# Patient Record
Sex: MALE | Race: BLACK OR AFRICAN AMERICAN | NOT HISPANIC OR LATINO | Employment: OTHER | URBAN - METROPOLITAN AREA
[De-identification: names, ages, dates, MRNs, and addresses within clinical notes are randomized per-mention and may not be internally consistent; named-entity substitution may affect disease eponyms.]

---

## 2018-01-17 ENCOUNTER — HOSPITAL ENCOUNTER (INPATIENT)
Facility: HOSPITAL | Age: 53
LOS: 2 days | Discharge: HOME | DRG: 280 | End: 2018-01-19
Attending: EMERGENCY MEDICINE | Admitting: HOSPITALIST
Payer: MEDICAID

## 2018-01-17 DIAGNOSIS — R07.9 CHEST PAIN: ICD-10-CM

## 2018-01-17 DIAGNOSIS — I25.10 CORONARY ARTERY DISEASE, ANGINA PRESENCE UNSPECIFIED, UNSPECIFIED VESSEL OR LESION TYPE, UNSPECIFIED WHETHER NATIVE OR TRANSPLANTED HEART: Chronic | ICD-10-CM

## 2018-01-17 DIAGNOSIS — D63.8 ANEMIA OF CHRONIC DISEASE: Chronic | ICD-10-CM

## 2018-01-17 DIAGNOSIS — Z72.0 TOBACCO ABUSE: Chronic | ICD-10-CM

## 2018-01-17 DIAGNOSIS — E78.2 MIXED HYPERLIPIDEMIA: Chronic | ICD-10-CM

## 2018-01-17 DIAGNOSIS — Z86.73 HISTORY OF CVA (CEREBROVASCULAR ACCIDENT): Chronic | ICD-10-CM

## 2018-01-17 DIAGNOSIS — I21.4 NSTEMI (NON-ST ELEVATED MYOCARDIAL INFARCTION): Primary | ICD-10-CM

## 2018-01-17 DIAGNOSIS — F14.10 COCAINE ABUSE: Chronic | ICD-10-CM

## 2018-01-17 DIAGNOSIS — I10 HYPERTENSION, UNSPECIFIED TYPE: Chronic | ICD-10-CM

## 2018-01-17 DIAGNOSIS — Z95.1 HX OF CABG: ICD-10-CM

## 2018-01-17 DIAGNOSIS — I50.43 ACUTE ON CHRONIC COMBINED SYSTOLIC (CONGESTIVE) AND DIASTOLIC (CONGESTIVE) HEART FAILURE: ICD-10-CM

## 2018-01-17 LAB
ALBUMIN SERPL BCP-MCNC: 3.5 G/DL
ALP SERPL-CCNC: 68 U/L
ALT SERPL W/O P-5'-P-CCNC: 37 U/L
AMPHET+METHAMPHET UR QL: NEGATIVE
ANION GAP SERPL CALC-SCNC: 9 MMOL/L
AST SERPL-CCNC: 43 U/L
BARBITURATES UR QL SCN>200 NG/ML: NEGATIVE
BASOPHILS # BLD AUTO: 0.04 K/UL
BASOPHILS NFR BLD: 0.7 %
BENZODIAZ UR QL SCN>200 NG/ML: NEGATIVE
BILIRUB SERPL-MCNC: 0.7 MG/DL
BNP SERPL-MCNC: 1849 PG/ML
BUN SERPL-MCNC: 23 MG/DL
BZE UR QL SCN: NEGATIVE
CALCIUM SERPL-MCNC: 9.2 MG/DL
CANNABINOIDS UR QL SCN: NEGATIVE
CHLORIDE SERPL-SCNC: 110 MMOL/L
CO2 SERPL-SCNC: 20 MMOL/L
CREAT SERPL-MCNC: 1.4 MG/DL
CREAT UR-MCNC: <5 MG/DL
DIFFERENTIAL METHOD: ABNORMAL
EOSINOPHIL # BLD AUTO: 0.1 K/UL
EOSINOPHIL NFR BLD: 1.3 %
ERYTHROCYTE [DISTWIDTH] IN BLOOD BY AUTOMATED COUNT: 17.8 %
EST. GFR  (AFRICAN AMERICAN): >60 ML/MIN/1.73 M^2
EST. GFR  (NON AFRICAN AMERICAN): 57 ML/MIN/1.73 M^2
ESTIMATED PA SYSTOLIC PRESSURE: 45.45
GLOBAL PERICARDIAL EFFUSION: ABNORMAL
GLUCOSE SERPL-MCNC: 95 MG/DL
HCT VFR BLD AUTO: 32.4 %
HGB BLD-MCNC: 10.3 G/DL
INR PPP: 1.1
LYMPHOCYTES # BLD AUTO: 2.2 K/UL
LYMPHOCYTES NFR BLD: 36.1 %
MCH RBC QN AUTO: 25.6 PG
MCHC RBC AUTO-ENTMCNC: 31.8 G/DL
MCV RBC AUTO: 81 FL
METHADONE UR QL SCN>300 NG/ML: NEGATIVE
MITRAL VALVE REGURGITATION: ABNORMAL
MONOCYTES # BLD AUTO: 0.9 K/UL
MONOCYTES NFR BLD: 15.3 %
NEUTROPHILS # BLD AUTO: 2.8 K/UL
NEUTROPHILS NFR BLD: 46.4 %
OPIATES UR QL SCN: ABNORMAL
PCP UR QL SCN>25 NG/ML: NEGATIVE
PLATELET # BLD AUTO: 231 K/UL
PMV BLD AUTO: 13.8 FL
POTASSIUM SERPL-SCNC: 4.1 MMOL/L
PROT SERPL-MCNC: 6.3 G/DL
PROTHROMBIN TIME: 11 SEC
RBC # BLD AUTO: 4.02 M/UL
RETIRED EF AND QEF - SEE NOTES: 20 (ref 55–65)
SODIUM SERPL-SCNC: 139 MMOL/L
TOXICOLOGY INFORMATION: ABNORMAL
TRICUSPID VALVE REGURGITATION: ABNORMAL
TROPONIN I SERPL DL<=0.01 NG/ML-MCNC: 1.24 NG/ML
TROPONIN I SERPL DL<=0.01 NG/ML-MCNC: 1.3 NG/ML
TROPONIN I SERPL DL<=0.01 NG/ML-MCNC: 1.43 NG/ML
WBC # BLD AUTO: 5.95 K/UL

## 2018-01-17 PROCEDURE — 99291 CRITICAL CARE FIRST HOUR: CPT | Mod: 25

## 2018-01-17 PROCEDURE — 25000003 PHARM REV CODE 250: Performed by: HOSPITALIST

## 2018-01-17 PROCEDURE — 25000003 PHARM REV CODE 250: Performed by: INTERNAL MEDICINE

## 2018-01-17 PROCEDURE — 63600175 PHARM REV CODE 636 W HCPCS: Performed by: EMERGENCY MEDICINE

## 2018-01-17 PROCEDURE — 93306 TTE W/DOPPLER COMPLETE: CPT

## 2018-01-17 PROCEDURE — 85025 COMPLETE CBC W/AUTO DIFF WBC: CPT

## 2018-01-17 PROCEDURE — 63600175 PHARM REV CODE 636 W HCPCS: Performed by: HOSPITALIST

## 2018-01-17 PROCEDURE — 20000000 HC ICU ROOM

## 2018-01-17 PROCEDURE — 25000003 PHARM REV CODE 250: Performed by: EMERGENCY MEDICINE

## 2018-01-17 PROCEDURE — 96374 THER/PROPH/DIAG INJ IV PUSH: CPT

## 2018-01-17 PROCEDURE — 83880 ASSAY OF NATRIURETIC PEPTIDE: CPT

## 2018-01-17 PROCEDURE — 36415 COLL VENOUS BLD VENIPUNCTURE: CPT

## 2018-01-17 PROCEDURE — 93010 ELECTROCARDIOGRAM REPORT: CPT | Mod: 76,,, | Performed by: INTERNAL MEDICINE

## 2018-01-17 PROCEDURE — 93010 ELECTROCARDIOGRAM REPORT: CPT | Mod: ,,, | Performed by: INTERNAL MEDICINE

## 2018-01-17 PROCEDURE — 84484 ASSAY OF TROPONIN QUANT: CPT | Mod: 91

## 2018-01-17 PROCEDURE — 80307 DRUG TEST PRSMV CHEM ANLYZR: CPT

## 2018-01-17 PROCEDURE — 80053 COMPREHEN METABOLIC PANEL: CPT

## 2018-01-17 PROCEDURE — 85610 PROTHROMBIN TIME: CPT

## 2018-01-17 PROCEDURE — 93005 ELECTROCARDIOGRAM TRACING: CPT

## 2018-01-17 PROCEDURE — 96372 THER/PROPH/DIAG INJ SC/IM: CPT

## 2018-01-17 PROCEDURE — 99233 SBSQ HOSP IP/OBS HIGH 50: CPT | Mod: ,,, | Performed by: INTERNAL MEDICINE

## 2018-01-17 PROCEDURE — 93306 TTE W/DOPPLER COMPLETE: CPT | Mod: 26,,, | Performed by: INTERNAL MEDICINE

## 2018-01-17 RX ORDER — HYDRALAZINE HYDROCHLORIDE 20 MG/ML
10 INJECTION INTRAMUSCULAR; INTRAVENOUS ONCE
Status: COMPLETED | OUTPATIENT
Start: 2018-01-17 | End: 2018-01-17

## 2018-01-17 RX ORDER — NITROGLYCERIN 0.4 MG/1
0.4 TABLET SUBLINGUAL EVERY 5 MIN PRN
Status: ON HOLD | COMMUNITY
End: 2019-01-01 | Stop reason: SDUPTHER

## 2018-01-17 RX ORDER — ONDANSETRON 2 MG/ML
4 INJECTION INTRAMUSCULAR; INTRAVENOUS EVERY 6 HOURS PRN
Status: DISCONTINUED | OUTPATIENT
Start: 2018-01-17 | End: 2018-01-19 | Stop reason: HOSPADM

## 2018-01-17 RX ORDER — MORPHINE SULFATE 10 MG/ML
4 INJECTION INTRAMUSCULAR; INTRAVENOUS; SUBCUTANEOUS
Status: COMPLETED | OUTPATIENT
Start: 2018-01-17 | End: 2018-01-17

## 2018-01-17 RX ORDER — METOPROLOL TARTRATE 25 MG/1
25 TABLET, FILM COATED ORAL
Status: COMPLETED | OUTPATIENT
Start: 2018-01-17 | End: 2018-01-17

## 2018-01-17 RX ORDER — MIRTAZAPINE 7.5 MG/1
7.5 TABLET, FILM COATED ORAL NIGHTLY PRN
Status: DISCONTINUED | OUTPATIENT
Start: 2018-01-17 | End: 2018-01-19 | Stop reason: HOSPADM

## 2018-01-17 RX ORDER — ATORVASTATIN CALCIUM 40 MG/1
80 TABLET, FILM COATED ORAL DAILY
Status: DISCONTINUED | OUTPATIENT
Start: 2018-01-17 | End: 2018-01-19 | Stop reason: HOSPADM

## 2018-01-17 RX ORDER — FUROSEMIDE 10 MG/ML
40 INJECTION INTRAMUSCULAR; INTRAVENOUS 2 TIMES DAILY
Status: DISCONTINUED | OUTPATIENT
Start: 2018-01-17 | End: 2018-01-18

## 2018-01-17 RX ORDER — ASPIRIN 81 MG/1
81 TABLET ORAL DAILY
Status: DISCONTINUED | OUTPATIENT
Start: 2018-01-17 | End: 2018-01-19 | Stop reason: HOSPADM

## 2018-01-17 RX ORDER — NAPROXEN SODIUM 220 MG/1
81 TABLET, FILM COATED ORAL DAILY
COMMUNITY

## 2018-01-17 RX ORDER — MORPHINE SULFATE 10 MG/ML
4 INJECTION INTRAMUSCULAR; INTRAVENOUS; SUBCUTANEOUS EVERY 4 HOURS PRN
Status: DISCONTINUED | OUTPATIENT
Start: 2018-01-17 | End: 2018-01-19 | Stop reason: HOSPADM

## 2018-01-17 RX ORDER — ATORVASTATIN CALCIUM 10 MG/1
TABLET, FILM COATED ORAL DAILY
Status: ON HOLD | COMMUNITY
End: 2019-01-01 | Stop reason: SDUPTHER

## 2018-01-17 RX ORDER — METOPROLOL TARTRATE 25 MG/1
25 TABLET, FILM COATED ORAL 2 TIMES DAILY
Status: DISCONTINUED | OUTPATIENT
Start: 2018-01-17 | End: 2018-01-17

## 2018-01-17 RX ORDER — NITROGLYCERIN 20 MG/100ML
5 INJECTION INTRAVENOUS CONTINUOUS
Status: DISCONTINUED | OUTPATIENT
Start: 2018-01-17 | End: 2018-01-18

## 2018-01-17 RX ORDER — ASPIRIN 325 MG
325 TABLET ORAL
Status: COMPLETED | OUTPATIENT
Start: 2018-01-17 | End: 2018-01-17

## 2018-01-17 RX ORDER — NITROGLYCERIN 0.4 MG/1
0.4 TABLET SUBLINGUAL EVERY 5 MIN PRN
Status: DISCONTINUED | OUTPATIENT
Start: 2018-01-17 | End: 2018-01-19 | Stop reason: HOSPADM

## 2018-01-17 RX ORDER — AMLODIPINE BESYLATE 5 MG/1
10 TABLET ORAL ONCE
Status: COMPLETED | OUTPATIENT
Start: 2018-01-17 | End: 2018-01-17

## 2018-01-17 RX ORDER — POLYETHYLENE GLYCOL 3350 17 G/17G
17 POWDER, FOR SOLUTION ORAL 2 TIMES DAILY PRN
Status: DISCONTINUED | OUTPATIENT
Start: 2018-01-17 | End: 2018-01-19 | Stop reason: HOSPADM

## 2018-01-17 RX ORDER — LISINOPRIL 10 MG/1
10 TABLET ORAL DAILY
Status: ON HOLD | COMMUNITY
End: 2019-01-01 | Stop reason: SDUPTHER

## 2018-01-17 RX ORDER — LISINOPRIL 5 MG/1
10 TABLET ORAL DAILY
Status: DISCONTINUED | OUTPATIENT
Start: 2018-01-17 | End: 2018-01-19 | Stop reason: HOSPADM

## 2018-01-17 RX ORDER — CLOPIDOGREL 300 MG/1
600 TABLET, FILM COATED ORAL ONCE
Status: COMPLETED | OUTPATIENT
Start: 2018-01-17 | End: 2018-01-17

## 2018-01-17 RX ORDER — ACETAMINOPHEN 325 MG/1
650 TABLET ORAL EVERY 6 HOURS PRN
Status: DISCONTINUED | OUTPATIENT
Start: 2018-01-17 | End: 2018-01-19 | Stop reason: HOSPADM

## 2018-01-17 RX ORDER — ENOXAPARIN SODIUM 100 MG/ML
1 INJECTION SUBCUTANEOUS
Status: COMPLETED | OUTPATIENT
Start: 2018-01-17 | End: 2018-01-17

## 2018-01-17 RX ORDER — DOCUSATE SODIUM 100 MG/1
100 CAPSULE, LIQUID FILLED ORAL DAILY
Status: DISCONTINUED | OUTPATIENT
Start: 2018-01-18 | End: 2018-01-19 | Stop reason: HOSPADM

## 2018-01-17 RX ADMIN — FUROSEMIDE 40 MG: 10 INJECTION, SOLUTION INTRAMUSCULAR; INTRAVENOUS at 02:01

## 2018-01-17 RX ADMIN — ASPIRIN 81 MG: 81 TABLET, COATED ORAL at 12:01

## 2018-01-17 RX ADMIN — ATORVASTATIN CALCIUM 80 MG: 40 TABLET, FILM COATED ORAL at 12:01

## 2018-01-17 RX ADMIN — ENOXAPARIN SODIUM 80 MG: 100 INJECTION SUBCUTANEOUS at 08:01

## 2018-01-17 RX ADMIN — NITROGLYCERIN 5 MCG/MIN: 20 INJECTION INTRAVENOUS at 03:01

## 2018-01-17 RX ADMIN — ONDANSETRON 4 MG: 2 INJECTION INTRAMUSCULAR; INTRAVENOUS at 06:01

## 2018-01-17 RX ADMIN — METOPROLOL TARTRATE 25 MG: 25 TABLET ORAL at 08:01

## 2018-01-17 RX ADMIN — LISINOPRIL 10 MG: 5 TABLET ORAL at 12:01

## 2018-01-17 RX ADMIN — HYDRALAZINE HYDROCHLORIDE 10 MG: 20 INJECTION INTRAMUSCULAR; INTRAVENOUS at 02:01

## 2018-01-17 RX ADMIN — MORPHINE SULFATE 4 MG: 10 INJECTION INTRAVENOUS at 08:01

## 2018-01-17 RX ADMIN — MORPHINE SULFATE 4 MG: 10 INJECTION INTRAVENOUS at 01:01

## 2018-01-17 RX ADMIN — NITROGLYCERIN 1 INCH: 20 OINTMENT TOPICAL at 08:01

## 2018-01-17 RX ADMIN — ASPIRIN 325 MG ORAL TABLET 325 MG: 325 PILL ORAL at 08:01

## 2018-01-17 RX ADMIN — METOPROLOL TARTRATE 25 MG: 25 TABLET ORAL at 12:01

## 2018-01-17 RX ADMIN — NITROGLYCERIN 0.4 MG: 0.4 TABLET SUBLINGUAL at 12:01

## 2018-01-17 RX ADMIN — AMLODIPINE BESYLATE 10 MG: 5 TABLET ORAL at 02:01

## 2018-01-17 RX ADMIN — CLOPIDOGREL BISULFATE 600 MG: 300 TABLET, FILM COATED ORAL at 12:01

## 2018-01-17 NOTE — NURSING
Dr. Laureano at bedside. New orders received for hydralizine and lasix. Will continue to monitor.

## 2018-01-17 NOTE — CONSULTS
[facility]  Cardiology  Consult Note    Patient Name: Abdoul Ochoa  MRN: [id number]  Admission Date: 1/17/2018  Hospital Length of Stay: 0 days  Code Status: Full Code   Attending Provider: Marline Ott MD   Consulting Provider: Charlie Navarro MD  Primary Care Physician: Primary Doctor No  Principal Problem:<principal problem not specified>    Patient information was obtained from patient, past medical records and ER records.     Inpatient consult to Cardiology  Consult performed by: CHARLIE NAVARRO  Consult ordered by: VAMSHI HWANG  Reason for consult: CHF, abnormal troponin        Subjective:     Chief Complaint:  Chest pain and shortness of breath     HPI:   52-year-old male with a history of coronary artery disease status post bypass presents with midsternal chest pain that is nonradiating since yesterday.  Patient says that he has had postoperative pain from his incision however this feels different.  He took nitroglycerin yesterday without relief.  Symptoms are mild to moderate and constant.  Denies abdominal pain, leg pain or headache.     Patient is known to me from previous practice at [facility].  He still follows with Dr. Gregg on questioning.  Says that he had bypass surgery several weeks ago at [facility].  He has a history of CAD with prior stents.  From my recollection and his admittance he does have a previous history of cocaine abuse.  Says that he hasn't done anything recently.  He said his symptoms were fairly progressive and he hasn't seen any cardiology in a while.  Says that he had an appointment next week with surgery?  He somewhat of a poor historian terms of his recollection of facts.  He's started acutely feeling worse this afternoon in terms of chest pain, shortness of breath and palpitations.  His blood pressure is significantly elevated currently.  He is complaining of some PND, orthopnea and mild lower extremity edema.  He denies  any history of dizziness, presyncope or syncope.  His troponin is elevated at 1 but in a flat pattern.    Past Medical History:   Diagnosis Date    Hypertension     MI (myocardial infarction)     Mix2       Past Surgical History:   Procedure Laterality Date    BYPASS GRAFT         Review of patient's allergies indicates:  No Known Allergies    No current facility-administered medications on file prior to encounter.      No current outpatient prescriptions on file prior to encounter.     Family History     None        Social History Main Topics    Smoking status: Light Tobacco Smoker    Smokeless tobacco: Not on file    Alcohol use No    Drug use: No    Sexual activity: Not on file     Review of Systems   Constitution: Negative.   HENT: Negative.    Eyes: Negative.    Cardiovascular: Positive for chest pain, dyspnea on exertion, leg swelling, orthopnea and paroxysmal nocturnal dyspnea. Negative for irregular heartbeat, near-syncope, palpitations and syncope.   Respiratory: Positive for shortness of breath.    Skin: Negative.    Musculoskeletal: Negative.    Gastrointestinal: Negative for abdominal pain, constipation and diarrhea.   Genitourinary: Negative for dysuria.   Neurological: Negative for dizziness.   Psychiatric/Behavioral: Negative.      Objective:     Vital Signs (Most Recent):  Temp: 97.3 °F (36.3 °C) (01/17/18 1140)  Pulse: 96 (01/17/18 1358)  Resp: 18 (01/17/18 1140)  BP: (!) 190/131 (01/17/18 1435)  SpO2: 98 % (01/17/18 1140) Vital Signs (24h Range):  Temp:  [97.3 °F (36.3 °C)-98.2 °F (36.8 °C)] 97.3 °F (36.3 °C)  Pulse:  [84-96] 96  Resp:  [16-18] 18  SpO2:  [95 %-99 %] 98 %  BP: (160-199)/(108-137) 190/131     Weight: 75.8 kg (167 lb)  Body mass index is 26.95 kg/m².    SpO2: 98 %  O2 Device (Oxygen Therapy): room air    No intake or output data in the 24 hours ending 01/17/18 1457    Lines/Drains/Airways     Peripheral Intravenous Line                 Peripheral IV - Single Lumen 01/17/18  Left Forearm less than 1 day                Physical Exam   Constitutional: He is oriented to person, place, and time. He appears well-developed and well-nourished. He appears distressed.   HENT:   Head: Normocephalic and atraumatic.   Eyes: Conjunctivae and EOM are normal. Pupils are equal, round, and reactive to light.   Neck: Normal range of motion. Neck supple. No thyromegaly present.   Cardiovascular: Normal rate, regular rhythm and normal heart sounds.    No murmur heard.  Pulmonary/Chest: Effort normal and breath sounds normal. No respiratory distress. He has no wheezes. He has no rales. He exhibits no tenderness.   Abdominal: Soft. Bowel sounds are normal.   Musculoskeletal: He exhibits no edema.   Neurological: He is alert and oriented to person, place, and time.   Skin: Skin is warm. He is diaphoretic.   Psychiatric: He has a normal mood and affect. His behavior is normal.       Significant Labs:   CMP   Recent Labs  Lab 01/17/18  0636      K 4.1      CO2 20*   GLU 95   BUN 23*   CREATININE 1.4   CALCIUM 9.2   PROT 6.3   ALBUMIN 3.5   BILITOT 0.7   ALKPHOS 68   AST 43*   ALT 37   ANIONGAP 9   ESTGFRAFRICA >60   EGFRNONAA 57*   , CBC   Recent Labs  Lab 01/17/18  0636   WBC 5.95   HGB 10.3*   HCT 32.4*      , INR   Recent Labs  Lab 01/17/18  0636   INR 1.1   , Lipid Panel No results for input(s): CHOL, HDL, LDLCALC, TRIG, CHOLHDL in the last 48 hours. and Troponin   Recent Labs  Lab 01/17/18  0636 01/17/18  1155   TROPONINI 1.242* 1.304*       Significant Imaging: Echocardiogram:   2D echo with color flow doppler:   Results for orders placed or performed during the hospital encounter of 01/17/18   2D echo with color flow doppler   Result Value Ref Range    EF 20 (A) 55 - 65    Mitral Valve Regurgitation MODERATE TO SEVERE (A)     Est. PA Systolic Pressure 45.45 (A)     Pericardial Effusion NONE     Tricuspid Valve Regurgitation MILD TO MODERATE      Assessment and Plan:     Acute on  chronic combined systolic (congestive) and diastolic (congestive) heart failure    Severe biventricular failure  DC metoprolol   We'll transfer to the ICU for observation  May require milrinone        NSTEMI (non-ST elevated myocardial infarction)    Troponin elevation but in flat pattern now  Possibly related to strain  Consider cardiac catheterization when stable        Hypertension    Add amlodipine  When necessary hydralazine  Continue medicines as above  May need transfer to ICU for nitroglycerin drip        Mixed hyperlipidemia    On statin        Cocaine abuse    Per history  Check U tox        Tobacco abuse    Counseled and says that he has quit            VTE Risk Mitigation         Ordered     Medium Risk of VTE  Once      01/17/18 1139     Place sequential compression device  Until discontinued      01/17/18 1139     Place CHAPITO hose  Until discontinued      01/17/18 1139          Thank you for your consult. I will follow-up with patient. Please contact us if you have any additional questions.    Charlie Laureano MD  Cardiology   [facility]

## 2018-01-17 NOTE — NURSING
Nurse reported to Dr. Laureano patient's continued c/o chest pain with nausea, vomiting and diaphoresis after awakening after a nap. STAT EKG ordered and stated he would be at bedside.

## 2018-01-17 NOTE — ASSESSMENT & PLAN NOTE
Add amlodipine  When necessary hydralazine  Continue medicines as above  May need transfer to ICU for nitroglycerin drip

## 2018-01-17 NOTE — ASSESSMENT & PLAN NOTE
Troponin elevation but in flat pattern now  Possibly related to strain  Consider cardiac catheterization when stable

## 2018-01-17 NOTE — NURSING
Reported to Dr. Ott patient's continued chest pain and elevated blood pressure, no new orders given but she will review chart and enter new orders. Will continue to monitor.

## 2018-01-17 NOTE — NURSING
Patient transferred to ICU via bed with cardiac monitor and oxygen with M[facility] Charge Nurse. Report given to JUAN Portillo in ICU.

## 2018-01-17 NOTE — NURSING
Patient to room via wheelchair by transport. Telemetry monitor placed on patient. Patient c/o non-radiating mid-sternal chest pain, denies nausea, vomiting, diaphoresis or SOB. Patient requesting Morphine medication, nurse advised patient that the time from last does is too close, pt verbalized understanding. Bed in lowest position, bed alarm set, call light within reach. Will continue to monitor.

## 2018-01-17 NOTE — NURSING
Nurse reported continued c/o chest pains and increased SOB. Nitroglycerin ordered and to transfer patient to ICU. Mare, Charge Nurse notified of need for transfer.

## 2018-01-17 NOTE — ED PROVIDER NOTES
Encounter Date: 1/17/2018       History     Chief Complaint   Patient presents with    Chest Pain     Patient states that the patient has had chest pain since yesterday. Cardiac hx.      52-year-old male with a history of coronary artery disease status post bypass presents with midsternal chest pain that is nonradiating since yesterday.  Patient says that he has had postoperative pain from his incision however this feels different.  He took nitroglycerin yesterday without relief.  Symptoms are mild to moderate and constant.  Denies abdominal pain, leg pain or headache.          Review of patient's allergies indicates:  No Known Allergies  Past Medical History:   Diagnosis Date    Hypertension     MI (myocardial infarction)     Mix2     Past Surgical History:   Procedure Laterality Date    BYPASS GRAFT       No family history on file.  Social History   Substance Use Topics    Smoking status: Light Tobacco Smoker    Smokeless tobacco: Not on file    Alcohol use No     Review of Systems   Constitutional: Negative for appetite change.   Eyes: Negative for pain.   Respiratory: Negative for shortness of breath.    Cardiovascular: Positive for chest pain.   Gastrointestinal: Negative for abdominal pain, nausea and vomiting.   Genitourinary: Negative for frequency.   Musculoskeletal: Negative for arthralgias and neck pain.   Neurological: Negative for headaches.   Psychiatric/Behavioral: Negative for confusion.       Physical Exam     Initial Vitals [01/17/18 0613]   BP Pulse Resp Temp SpO2   (!) 160/126 90 18 98.2 °F (36.8 °C) 95 %      MAP       137.33         Physical Exam    Nursing note and vitals reviewed.  HENT:   Head: Atraumatic.   Eyes: Conjunctivae and EOM are normal.   Neck: Normal range of motion.   Cardiovascular: Exam reveals no gallop and no friction rub.    No murmur heard.  Pulmonary/Chest: Breath sounds normal. No respiratory distress. He has no wheezes. He has no rales. He exhibits no tenderness.    Abdominal: Soft. Bowel sounds are normal. He exhibits no distension. There is no tenderness.   Musculoskeletal: Normal range of motion. He exhibits no edema.   Neurological: He is alert and oriented to person, place, and time.   Skin: Skin is warm and dry.   Sternotomy incision c/d/i   Psychiatric: He has a normal mood and affect.         ED Course   Critical Care  Date/Time: 1/17/2018 8:12 AM  Performed by: VAMSHI HWANG  Authorized by: VAMSHI HWANG   Direct patient critical care time: 32 minutes  Ordering / reviewing critical care time: 10 minutes  Documentation critical care time: 10 minutes  Consulting other physicians critical care time: 5 minutes  Total critical care time (exclusive of procedural time) : 57 minutes  Critical care was necessary to treat or prevent imminent or life-threatening deterioration of the following conditions: circulatory failure, cardiac failure and respiratory failure.  Critical care was time spent personally by me on the following activities: development of treatment plan with patient or surrogate, discussions with consultants, evaluation of patient's response to treatment, examination of patient, ordering and performing treatments and interventions, obtaining history from patient or surrogate, ordering and review of laboratory studies, pulse oximetry, re-evaluation of patient's condition, ordering and review of radiographic studies and review of old charts.        Labs Reviewed   CBC W/ AUTO DIFFERENTIAL - Abnormal; Notable for the following:        Result Value    RBC 4.02 (*)     Hemoglobin 10.3 (*)     Hematocrit 32.4 (*)     MCV 81 (*)     MCH 25.6 (*)     MCHC 31.8 (*)     RDW 17.8 (*)     MPV 13.8 (*)     Mono% 15.3 (*)     All other components within normal limits   COMPREHENSIVE METABOLIC PANEL - Abnormal; Notable for the following:     CO2 20 (*)     BUN, Bld 23 (*)     AST 43 (*)     eGFR if non  57 (*)     All other components within normal limits    TROPONIN I - Abnormal; Notable for the following:     Troponin I 1.242 (*)     All other components within normal limits   B-TYPE NATRIURETIC PEPTIDE - Abnormal; Notable for the following:     BNP 1,849 (*)     All other components within normal limits   PROTIME-INR             Medical Decision Making:   Initial Assessment:   52-year-old male with a history of recent CABG presents with chest pain since yesterday.  Symptoms are not reproducible.  Vital signs unremarkable.  Physical exam is grossly unremarkable.  EKG reviewed and interpreted myself shows no evidence of acute ischemia.  Chest x-ray reviewed and interpreted myself shows no evidence of pneumothorax, pneumonia or pulmonary edema.  Labs notable for troponin of 1.2.  Creatinine 1.4.  No previous size for comparison.  Concern for NSTEMI.  Lovenox and aspirin given.  Discussed with hospitalist who will admit for further evaluation.                   ED Course      Clinical Impression:   The primary encounter diagnosis was NSTEMI (non-ST elevated myocardial infarction). A diagnosis of Hx of CABG was also pertinent to this visit.                           Mu Duran MD  01/17/18 5469

## 2018-01-17 NOTE — ED NOTES
Noemi Ambriz ( rosalinda) called wanting a status update. Patient gave permission to give edwar status update. Call Noemi with any status changes @ [phone]

## 2018-01-17 NOTE — HPI
52-year-old male with a history of coronary artery disease status post bypass presents with midsternal chest pain that is nonradiating since yesterday.  Patient says that he has had postoperative pain from his incision however this feels different.  He took nitroglycerin yesterday without relief.  Symptoms are mild to moderate and constant.  Denies abdominal pain, leg pain or headache.     Patient is known to me from previous practice at [facility].  He still follows with Dr. Gregg on questioning.  Says that he had bypass surgery several weeks ago at [facility].  He has a history of CAD with prior stents.  From my recollection and his admittance he does have a previous history of cocaine abuse.  Says that he hasn't done anything recently.  He said his symptoms were fairly progressive and he hasn't seen any cardiology in a while.  Says that he had an appointment next week with surgery?  He somewhat of a poor historian terms of his recollection of facts.  He's started acutely feeling worse this afternoon in terms of chest pain, shortness of breath and palpitations.  His blood pressure is significantly elevated currently.  He is complaining of some PND, orthopnea and mild lower extremity edema.  He denies any history of dizziness, presyncope or syncope.  His troponin is elevated at 1 but in a flat pattern.

## 2018-01-17 NOTE — ASSESSMENT & PLAN NOTE
Severe biventricular failure  DC metoprolol   We'll transfer to the ICU for observation  May require milrinone

## 2018-01-17 NOTE — SUBJECTIVE & OBJECTIVE
Past Medical History:   Diagnosis Date    Hypertension     MI (myocardial infarction)     Mix2       Past Surgical History:   Procedure Laterality Date    BYPASS GRAFT         Review of patient's allergies indicates:  No Known Allergies    No current facility-administered medications on file prior to encounter.      No current outpatient prescriptions on file prior to encounter.     Family History     None        Social History Main Topics    Smoking status: Light Tobacco Smoker    Smokeless tobacco: Not on file    Alcohol use No    Drug use: No    Sexual activity: Not on file     Review of Systems   Constitution: Negative.   HENT: Negative.    Eyes: Negative.    Cardiovascular: Positive for chest pain, dyspnea on exertion, leg swelling, orthopnea and paroxysmal nocturnal dyspnea. Negative for irregular heartbeat, near-syncope, palpitations and syncope.   Respiratory: Positive for shortness of breath.    Skin: Negative.    Musculoskeletal: Negative.    Gastrointestinal: Negative for abdominal pain, constipation and diarrhea.   Genitourinary: Negative for dysuria.   Neurological: Negative for dizziness.   Psychiatric/Behavioral: Negative.      Objective:     Vital Signs (Most Recent):  Temp: 97.3 °F (36.3 °C) (01/17/18 1140)  Pulse: 96 (01/17/18 1358)  Resp: 18 (01/17/18 1140)  BP: (!) 190/131 (01/17/18 1435)  SpO2: 98 % (01/17/18 1140) Vital Signs (24h Range):  Temp:  [97.3 °F (36.3 °C)-98.2 °F (36.8 °C)] 97.3 °F (36.3 °C)  Pulse:  [84-96] 96  Resp:  [16-18] 18  SpO2:  [95 %-99 %] 98 %  BP: (160-199)/(108-137) 190/131     Weight: 75.8 kg (167 lb)  Body mass index is 26.95 kg/m².    SpO2: 98 %  O2 Device (Oxygen Therapy): room air    No intake or output data in the 24 hours ending 01/17/18 1457    Lines/Drains/Airways     Peripheral Intravenous Line                 Peripheral IV - Single Lumen 01/17/18 Left Forearm less than 1 day                Physical Exam   Constitutional: He is oriented to person, place,  and time. He appears well-developed and well-nourished. He appears distressed.   HENT:   Head: Normocephalic and atraumatic.   Eyes: Conjunctivae and EOM are normal. Pupils are equal, round, and reactive to light.   Neck: Normal range of motion. Neck supple. No thyromegaly present.   Cardiovascular: Normal rate, regular rhythm and normal heart sounds.    No murmur heard.  Pulmonary/Chest: Effort normal and breath sounds normal. No respiratory distress. He has no wheezes. He has no rales. He exhibits no tenderness.   Abdominal: Soft. Bowel sounds are normal.   Musculoskeletal: He exhibits no edema.   Neurological: He is alert and oriented to person, place, and time.   Skin: Skin is warm. He is diaphoretic.   Psychiatric: He has a normal mood and affect. His behavior is normal.       Significant Labs:   CMP   Recent Labs  Lab 01/17/18  0636      K 4.1      CO2 20*   GLU 95   BUN 23*   CREATININE 1.4   CALCIUM 9.2   PROT 6.3   ALBUMIN 3.5   BILITOT 0.7   ALKPHOS 68   AST 43*   ALT 37   ANIONGAP 9   ESTGFRAFRICA >60   EGFRNONAA 57*   , CBC   Recent Labs  Lab 01/17/18  0636   WBC 5.95   HGB 10.3*   HCT 32.4*      , INR   Recent Labs  Lab 01/17/18  0636   INR 1.1   , Lipid Panel No results for input(s): CHOL, HDL, LDLCALC, TRIG, CHOLHDL in the last 48 hours. and Troponin   Recent Labs  Lab 01/17/18  0636 01/17/18  1155   TROPONINI 1.242* 1.304*       Significant Imaging: Echocardiogram:   2D echo with color flow doppler:   Results for orders placed or performed during the hospital encounter of 01/17/18   2D echo with color flow doppler   Result Value Ref Range    EF 20 (A) 55 - 65    Mitral Valve Regurgitation MODERATE TO SEVERE (A)     Est. PA Systolic Pressure 45.45 (A)     Pericardial Effusion NONE     Tricuspid Valve Regurgitation MILD TO MODERATE

## 2018-01-17 NOTE — PLAN OF CARE
Problem: Patient Care Overview  Goal: Plan of Care Review  Outcome: Ongoing (interventions implemented as appropriate)  Transferred to icu from telemetry with hypertension 206/123 and complaints of chest pain. Tridil drip started at 5mcg/min and titrated for sbp 160 and chest pain. Infusing at 45mcg/min at present with some relief of symptoms, continue to monitor/titrate. 2SR with PVC's on monitor. No falls, injuries or skin breakdown.

## 2018-01-18 PROBLEM — I10 HYPERTENSION: Chronic | Status: ACTIVE | Noted: 2018-01-17

## 2018-01-18 PROBLEM — D63.8 ANEMIA OF CHRONIC DISEASE: Status: ACTIVE | Noted: 2018-01-18

## 2018-01-18 PROBLEM — Z86.73 HISTORY OF CVA (CEREBROVASCULAR ACCIDENT): Status: ACTIVE | Noted: 2018-01-18

## 2018-01-18 PROBLEM — Z72.0 TOBACCO ABUSE: Chronic | Status: ACTIVE | Noted: 2018-01-17

## 2018-01-18 PROBLEM — E78.2 MIXED HYPERLIPIDEMIA: Chronic | Status: ACTIVE | Noted: 2018-01-17

## 2018-01-18 PROBLEM — I25.10 CORONARY ARTERY DISEASE: Status: ACTIVE | Noted: 2018-01-18

## 2018-01-18 PROBLEM — I25.10 CORONARY ARTERY DISEASE: Chronic | Status: ACTIVE | Noted: 2018-01-18

## 2018-01-18 PROBLEM — Z86.73 HISTORY OF CVA (CEREBROVASCULAR ACCIDENT): Chronic | Status: ACTIVE | Noted: 2018-01-18

## 2018-01-18 PROBLEM — F14.10 COCAINE ABUSE: Chronic | Status: ACTIVE | Noted: 2018-01-17

## 2018-01-18 PROBLEM — D63.8 ANEMIA OF CHRONIC DISEASE: Chronic | Status: ACTIVE | Noted: 2018-01-18

## 2018-01-18 LAB
ALBUMIN SERPL BCP-MCNC: 3.7 G/DL
ALP SERPL-CCNC: 72 U/L
ALT SERPL W/O P-5'-P-CCNC: 33 U/L
ANION GAP SERPL CALC-SCNC: 14 MMOL/L
AST SERPL-CCNC: 29 U/L
BASOPHILS # BLD AUTO: 0.06 K/UL
BASOPHILS NFR BLD: 0.7 %
BILIRUB SERPL-MCNC: 1.3 MG/DL
BUN SERPL-MCNC: 22 MG/DL
CALCIUM SERPL-MCNC: 9.5 MG/DL
CHLORIDE SERPL-SCNC: 103 MMOL/L
CHOLEST SERPL-MCNC: 156 MG/DL
CHOLEST/HDLC SERPL: 4.1 {RATIO}
CK MB SERPL-MCNC: 2.1 NG/ML
CK MB SERPL-RTO: 2.5 %
CK SERPL-CCNC: 84 U/L
CO2 SERPL-SCNC: 23 MMOL/L
CREAT SERPL-MCNC: 1.3 MG/DL
DIFFERENTIAL METHOD: ABNORMAL
EOSINOPHIL # BLD AUTO: 0 K/UL
EOSINOPHIL NFR BLD: 0.1 %
ERYTHROCYTE [DISTWIDTH] IN BLOOD BY AUTOMATED COUNT: 17.8 %
EST. GFR  (AFRICAN AMERICAN): >60 ML/MIN/1.73 M^2
EST. GFR  (NON AFRICAN AMERICAN): >60 ML/MIN/1.73 M^2
GLUCOSE SERPL-MCNC: 111 MG/DL
HCT VFR BLD AUTO: 36.3 %
HDLC SERPL-MCNC: 38 MG/DL
HDLC SERPL: 24.4 %
HGB BLD-MCNC: 11.9 G/DL
LDLC SERPL CALC-MCNC: 101.8 MG/DL
LYMPHOCYTES # BLD AUTO: 2.1 K/UL
LYMPHOCYTES NFR BLD: 25.5 %
MAGNESIUM SERPL-MCNC: 1.8 MG/DL
MCH RBC QN AUTO: 25.2 PG
MCHC RBC AUTO-ENTMCNC: 32.8 G/DL
MCV RBC AUTO: 77 FL
MONOCYTES # BLD AUTO: 1.4 K/UL
MONOCYTES NFR BLD: 16.2 %
NEUTROPHILS # BLD AUTO: 4.8 K/UL
NEUTROPHILS NFR BLD: 57.4 %
NONHDLC SERPL-MCNC: 118 MG/DL
PLATELET # BLD AUTO: 292 K/UL
PMV BLD AUTO: 12.8 FL
POTASSIUM SERPL-SCNC: 3.4 MMOL/L
PROT SERPL-MCNC: 6.7 G/DL
RBC # BLD AUTO: 4.72 M/UL
SODIUM SERPL-SCNC: 140 MMOL/L
TRIGL SERPL-MCNC: 81 MG/DL
TROPONIN I SERPL DL<=0.01 NG/ML-MCNC: 1.29 NG/ML
WBC # BLD AUTO: 8.33 K/UL

## 2018-01-18 PROCEDURE — 80053 COMPREHEN METABOLIC PANEL: CPT

## 2018-01-18 PROCEDURE — 25000003 PHARM REV CODE 250: Performed by: INTERNAL MEDICINE

## 2018-01-18 PROCEDURE — B2131ZZ FLUOROSCOPY OF MULTIPLE CORONARY ARTERY BYPASS GRAFTS USING LOW OSMOLAR CONTRAST: ICD-10-PCS | Performed by: INTERNAL MEDICINE

## 2018-01-18 PROCEDURE — 83735 ASSAY OF MAGNESIUM: CPT

## 2018-01-18 PROCEDURE — 83036 HEMOGLOBIN GLYCOSYLATED A1C: CPT

## 2018-01-18 PROCEDURE — 99900035 HC TECH TIME PER 15 MIN (STAT)

## 2018-01-18 PROCEDURE — 99152 MOD SED SAME PHYS/QHP 5/>YRS: CPT | Mod: ,,, | Performed by: INTERNAL MEDICINE

## 2018-01-18 PROCEDURE — 25000003 PHARM REV CODE 250

## 2018-01-18 PROCEDURE — 63600175 PHARM REV CODE 636 W HCPCS

## 2018-01-18 PROCEDURE — 4A03353 MEASUREMENT OF ARTERIAL FLOW, PULMONARY, PERCUTANEOUS APPROACH: ICD-10-PCS | Performed by: INTERNAL MEDICINE

## 2018-01-18 PROCEDURE — 82553 CREATINE MB FRACTION: CPT

## 2018-01-18 PROCEDURE — 85025 COMPLETE CBC W/AUTO DIFF WBC: CPT

## 2018-01-18 PROCEDURE — A4216 STERILE WATER/SALINE, 10 ML: HCPCS

## 2018-01-18 PROCEDURE — 80061 LIPID PANEL: CPT

## 2018-01-18 PROCEDURE — B2111ZZ FLUOROSCOPY OF MULTIPLE CORONARY ARTERIES USING LOW OSMOLAR CONTRAST: ICD-10-PCS | Performed by: INTERNAL MEDICINE

## 2018-01-18 PROCEDURE — 25000003 PHARM REV CODE 250: Performed by: HOSPITALIST

## 2018-01-18 PROCEDURE — 4A023N8 MEASUREMENT OF CARDIAC SAMPLING AND PRESSURE, BILATERAL, PERCUTANEOUS APPROACH: ICD-10-PCS | Performed by: INTERNAL MEDICINE

## 2018-01-18 PROCEDURE — 93461 R&L HRT ART/VENTRICLE ANGIO: CPT | Mod: 26,,, | Performed by: INTERNAL MEDICINE

## 2018-01-18 PROCEDURE — 99152 MOD SED SAME PHYS/QHP 5/>YRS: CPT

## 2018-01-18 PROCEDURE — 84484 ASSAY OF TROPONIN QUANT: CPT

## 2018-01-18 PROCEDURE — 21400001 HC TELEMETRY ROOM

## 2018-01-18 PROCEDURE — B3101ZZ FLUOROSCOPY OF THORACIC AORTA USING LOW OSMOLAR CONTRAST: ICD-10-PCS | Performed by: INTERNAL MEDICINE

## 2018-01-18 PROCEDURE — 36415 COLL VENOUS BLD VENIPUNCTURE: CPT

## 2018-01-18 PROCEDURE — 83874 ASSAY OF MYOGLOBIN: CPT

## 2018-01-18 PROCEDURE — B2151ZZ FLUOROSCOPY OF LEFT HEART USING LOW OSMOLAR CONTRAST: ICD-10-PCS | Performed by: INTERNAL MEDICINE

## 2018-01-18 RX ORDER — POTASSIUM CHLORIDE 20 MEQ/1
40 TABLET, EXTENDED RELEASE ORAL ONCE
Status: DISCONTINUED | OUTPATIENT
Start: 2018-01-18 | End: 2018-01-18

## 2018-01-18 RX ORDER — SODIUM CHLORIDE 9 MG/ML
INJECTION, SOLUTION INTRAVENOUS CONTINUOUS
Status: ACTIVE | OUTPATIENT
Start: 2018-01-18 | End: 2018-01-18

## 2018-01-18 RX ORDER — CARVEDILOL 6.25 MG/1
12.5 TABLET ORAL 2 TIMES DAILY
Status: DISCONTINUED | OUTPATIENT
Start: 2018-01-18 | End: 2018-01-19 | Stop reason: HOSPADM

## 2018-01-18 RX ADMIN — DOCUSATE SODIUM 100 MG: 100 CAPSULE, LIQUID FILLED ORAL at 08:01

## 2018-01-18 RX ADMIN — CARVEDILOL 12.5 MG: 6.25 TABLET, FILM COATED ORAL at 08:01

## 2018-01-18 RX ADMIN — LISINOPRIL 10 MG: 5 TABLET ORAL at 08:01

## 2018-01-18 RX ADMIN — ATORVASTATIN CALCIUM 80 MG: 40 TABLET, FILM COATED ORAL at 08:01

## 2018-01-18 RX ADMIN — SODIUM CHLORIDE 75 ML/HR: 0.9 INJECTION, SOLUTION INTRAVENOUS at 10:01

## 2018-01-18 RX ADMIN — CARVEDILOL 12.5 MG: 6.25 TABLET, FILM COATED ORAL at 10:01

## 2018-01-18 RX ADMIN — ASPIRIN 81 MG: 81 TABLET, COATED ORAL at 08:01

## 2018-01-18 NOTE — SUBJECTIVE & OBJECTIVE
Past Medical History:   Diagnosis Date    Coronary artery disease     Hyperlipidemia     Hypertension     MI (myocardial infarction)     Mix2    Stroke        Past Surgical History:   Procedure Laterality Date    BYPASS GRAFT         Review of patient's allergies indicates:  No Known Allergies    No current facility-administered medications on file prior to encounter.      No current outpatient prescriptions on file prior to encounter.     Family History     Problem Relation (Age of Onset)    Heart disease Mother, Father    Hypertension Mother, Father        Social History Main Topics    Smoking status: Light Tobacco Smoker     Packs/day: 0.25    Smokeless tobacco: Never Used    Alcohol use No    Drug use: No    Sexual activity: Not on file     Review of Systems   Constitutional: Negative for chills and fever.   HENT: Negative for sore throat.    Eyes: Negative for visual disturbance.   Respiratory: Positive for chest tightness and shortness of breath.    Cardiovascular: Positive for chest pain and leg swelling.   Gastrointestinal: Negative for abdominal pain, nausea and vomiting.   Endocrine: Negative for polyuria.   Genitourinary: Negative for dysuria.   Musculoskeletal: Negative for arthralgias.   Skin: Negative for rash.   Neurological: Negative for seizures and syncope.   Psychiatric/Behavioral: Negative for agitation.     Objective:     Vital Signs (Most Recent):  Temp: 98 °F (36.7 °C) (01/17/18 1900)  Pulse: 95 (01/17/18 2209)  Resp: (!) 27 (01/17/18 1930)  BP: 138/75 (01/17/18 2217)  SpO2: 100 % (01/17/18 2231) Vital Signs (24h Range):  Temp:  [97.3 °F (36.3 °C)-98.2 °F (36.8 °C)] 98 °F (36.7 °C)  Pulse:  [] 95  Resp:  [12-32] 27  SpO2:  [95 %-100 %] 100 %  BP: (122-229)/() 138/75     Weight: 70 kg (154 lb 5.2 oz)  Body mass index is 24.91 kg/m².    Physical Exam   Constitutional: He is oriented to person, place, and time. He appears well-developed.   HENT:   Head: Normocephalic and  atraumatic.   Old trach scar   Eyes: EOM are normal. Pupils are equal, round, and reactive to light.   Neck: Normal range of motion. Neck supple.   Cardiovascular: Normal rate and regular rhythm.    Well healed sternotomy scar   Pulmonary/Chest:   Course with some crackles at bases   Abdominal: Soft. Bowel sounds are normal.   Musculoskeletal: Normal range of motion. He exhibits edema.   Neurological: He is alert and oriented to person, place, and time.   Skin: Skin is warm and dry. Capillary refill takes less than 2 seconds. He is not diaphoretic.   Psychiatric: He has a normal mood and affect.   Nursing note and vitals reviewed.        CRANIAL NERVES     CN III, IV, VI   Pupils are equal, round, and reactive to light.  Extraocular motions are normal.        Significant Labs: All pertinent labs within the past 24 hours have been reviewed.    Significant Imaging: I have reviewed and interpreted all pertinent imaging results/findings within the past 24 hours.

## 2018-01-18 NOTE — NURSING
Pt received from cath lab. VSS on NTG gtts. @ 50 mcgs. Weaning to 40 mcgs per order(to off.)Carvedilol began for BP and PVC control . Rt groin soft, no evidence of hematoma or bleeding. Will follow pt closely and continue to wean NTG.

## 2018-01-18 NOTE — H&P
[facility] Medicine  History & Physical    Patient Name: Abdoul Ochoa  MRN: [id number]  Admission Date: 1/17/2018  Attending Physician: Marline Ott MD   Primary Care Provider: Primary Doctor No         Patient information was obtained from patient, past medical records and ER records.     Subjective:     Principal Problem:NSTEMI (non-ST elevated myocardial infarction)    Chief Complaint:   Chief Complaint   Patient presents with    Chest Pain     Patient states that the patient has had chest pain since yesterday. Cardiac hx.         HPI: 51 y/o male with CAD with recent 3V CABG approximately 1 month ago at [facility], HTN, HLP, +TOB and h/o CVA who presented with midsternal chest pain since yesterday. Pain is constant, moderate intensity, non-radiating, no reported N/V/diaphoresis, no reported relief with NTG, and associated +SOB with PND and orthopnea. In the ER, his troponin is elevated at 1.430 and BNP 1849.    Past Medical History:   Diagnosis Date    Coronary artery disease     Hyperlipidemia     Hypertension     MI (myocardial infarction)     Mix2    Stroke        Past Surgical History:   Procedure Laterality Date    BYPASS GRAFT         Review of patient's allergies indicates:  No Known Allergies    No current facility-administered medications on file prior to encounter.      No current outpatient prescriptions on file prior to encounter.     Family History     Problem Relation (Age of Onset)    Heart disease Mother, Father    Hypertension Mother, Father        Social History Main Topics    Smoking status: Light Tobacco Smoker     Packs/day: 0.25    Smokeless tobacco: Never Used    Alcohol use No    Drug use: No    Sexual activity: Not on file     Review of Systems   Constitutional: Negative for chills and fever.   HENT: Negative for sore throat.    Eyes: Negative for visual disturbance.   Respiratory: Positive for chest tightness and shortness of breath.     Cardiovascular: Positive for chest pain and leg swelling.   Gastrointestinal: Negative for abdominal pain, nausea and vomiting.   Endocrine: Negative for polyuria.   Genitourinary: Negative for dysuria.   Musculoskeletal: Negative for arthralgias.   Skin: Negative for rash.   Neurological: Negative for seizures and syncope.   Psychiatric/Behavioral: Negative for agitation.     Objective:     Vital Signs (Most Recent):  Temp: 98 °F (36.7 °C) (01/17/18 1900)  Pulse: 95 (01/17/18 2209)  Resp: (!) 27 (01/17/18 1930)  BP: 138/75 (01/17/18 2217)  SpO2: 100 % (01/17/18 2231) Vital Signs (24h Range):  Temp:  [97.3 °F (36.3 °C)-98.2 °F (36.8 °C)] 98 °F (36.7 °C)  Pulse:  [] 95  Resp:  [12-32] 27  SpO2:  [95 %-100 %] 100 %  BP: (122-229)/() 138/75     Weight: 70 kg (154 lb 5.2 oz)  Body mass index is 24.91 kg/m².    Physical Exam   Constitutional: He is oriented to person, place, and time. He appears well-developed.   HENT:   Head: Normocephalic and atraumatic.   Old trach scar   Eyes: EOM are normal. Pupils are equal, round, and reactive to light.   Neck: Normal range of motion. Neck supple.   Cardiovascular: Normal rate and regular rhythm.    Well healed sternotomy scar   Pulmonary/Chest:   Course with some crackles at bases   Abdominal: Soft. Bowel sounds are normal.   Musculoskeletal: Normal range of motion. He exhibits edema.   Neurological: He is alert and oriented to person, place, and time.   Skin: Skin is warm and dry. Capillary refill takes less than 2 seconds. He is not diaphoretic.   Psychiatric: He has a normal mood and affect.   Nursing note and vitals reviewed.        CRANIAL NERVES     CN III, IV, VI   Pupils are equal, round, and reactive to light.  Extraocular motions are normal.        Significant Labs: All pertinent labs within the past 24 hours have been reviewed.    Significant Imaging: I have reviewed and interpreted all pertinent imaging results/findings within the past 24  hours.    Assessment/Plan:     * NSTEMI (non-ST elevated myocardial infarction)    Presentation and elevation consistent with NSTEMI  NTG gtt along with ASA and statin, and beta-blocker stopped by Cardiology due to CHF  Follow markers and monitor in ICU  As per Cardiology with possible LHC tomorrow        Acute on chronic combined systolic (congestive) and diastolic (congestive) heart failure    Severe biventricular heart failure   ECHO done today with EF=20-25% and depressed right ventricular function  Pulmonary HTN with pressure of 45 mmHg  On NTG gtt  Diuresis with IV lasix  Strict I/Os and daily weights        Coronary artery disease    As discussed above.        Hypertension    Poorly controlled and transferred to ICU from floor  NTG gtt and medications adjusted per Cardiology  Beta-blocker stopped due to biventricular heart failure        Mixed hyperlipidemia    High dose statin  Check fasting lipids in am        Anemia of chronic disease    No reported bleeding and also likely related to recent surgery  Will monitor        History of CVA (cerebrovascular accident)    Continue ASA and statin        Tobacco abuse    Smoking cessation counseling done  Patient report he is ready to quit        Cocaine abuse    Reported in the past, with current denied use  Utox negative for cocaine          VTE Risk Mitigation         Ordered     Medium Risk of VTE  Once      01/17/18 1139     Place sequential compression device  Until discontinued      01/17/18 1139     Place CHAPITO hose  Until discontinued      01/17/18 1139        Critical care time spent on the evaluation and treatment of severe organ dysfunction, review of pertinent labs and imaging studies, discussions with consulting providers and discussions with patient/family: 60 minutes.     Marline Ott MD  Department of Hospital Medicine   [facility]

## 2018-01-18 NOTE — ASSESSMENT & PLAN NOTE
Severe biventricular heart failure   ECHO done today with EF=20-25% and depressed right ventricular function  Pulmonary HTN with pressure of 45 mmHg  On NTG gtt  Diuresis with IV lasix  Strict I/Os and daily weights

## 2018-01-18 NOTE — PLAN OF CARE
"   01/18/18 1657   Discharge Assessment   Assessment Type Discharge Planning Reassessment   Confirmed/corrected address and phone number on facesheet? Yes   Assessment information obtained from? Patient;Medical Record   Expected Length of Stay (days) 2   Communicated expected length of stay with patient/caregiver yes   Prior to hospitilization cognitive status: Alert/Oriented   Prior to hospitalization functional status: Independent   Current cognitive status: Alert/Oriented   Current Functional Status: Independent   Lives With sibling(s)   Able to Return to Prior Arrangements yes   Is patient able to care for self after discharge? Yes   Who are your caregiver(s) and their phone number(s)? independent--sister and brother in law if needed (demographics)   Patient's perception of discharge disposition home or selfcare   Readmission Within The Last 30 Days unable to assess  (patient at [facility] for surgery in December)   Patient currently being followed by outpatient case management? No   Patient currently receives any other outside agency services? No   Equipment Currently Used at Home none   Do you have any problems affording any of your prescribed medications? No   Is the patient taking medications as prescribed? no   If no, which medications is patient not taking? ran out   Does the patient have transportation home? No  (patient uses Medicaid transportation)   Does the patient receive services at the [facility]? (unknown)   Discharge Plan A Home with family   Patient/Family In Agreement With Plan yes   SW met with patient in ICU, explained role of SW/CM with treatment team, provided contact information with the "discharge planning begins on admission" checklist handout.   Confirmed information in demographics: updated.   SW reviewed the discharge planning folder, explained to leave in room with patient so that team/patient can place all written discharge information throughout hospital stay. Provided education " regarding the importance of using written discharge information to help manage health care at home.   SW provided education regarding the importance of obtaining, taking all medications at discharge. Preferred pharmacy is   [id number]  [place] [address] AT [facility] [address] [place] [address] [address] [facility]  [place] [address]  Phone: [phone] Fax: [phone]  .  SW provided education regarding importance of going to all follow up appointments to help manage health care at home. Patient prefers medical appointment at least 3 business days off so that can arrange medicaid transportation.    Per Dr Laureano note, patient current cardiologist at [facility] is Dr Gregg. Patient reports has no PCP.     SW will follow in ICU and assist as needed.

## 2018-01-18 NOTE — ASSESSMENT & PLAN NOTE
Poorly controlled and transferred to ICU from floor  NTG gtt and medications adjusted per Cardiology  Beta-blocker stopped due to biventricular heart failure

## 2018-01-18 NOTE — BRIEF OP NOTE
[facility]  Brief Operative Note    SUMMARY     Surgery Date: 1/18/2018     Surgeon(s) and Role:     * Charlie Laureano MD - Primary    Assisting Surgeon: None    Pre-op Diagnosis:  NSTEMI (non-ST elevated myocardial infarction) [I21.4]    Post-op Diagnosis:  NSTEMI    Procedure(s) (LRB):  Heart Cath With Grafts-Left (N/A)    Anesthesia: RN IV Sedation    Description of Procedure: Left heart catheterization with selective coronary angiography, graft restudy, right heart catheterization    Description of the findings of the procedure: Low filling pressures right and left consistent with volume depletion.  Grafts are patent including LIMA to LAD, SVG to OM and diagonal.  There is significant native vessel disease including distally occluded stent in the RCA well collateralized by the left  no obvious culprit for non-STEMI was identified    Recommendation:  -DC diuretics  -Gentle IV fluid hydration  -Wean nitroglycerin drip   -Adjust antihypertensives as needed  -Okay to transfer to telemetry and likely DC in a.m. if no problems    Estimated Blood Loss: Less than 50 cc         Specimens:   Specimen (12h ago through future)    None

## 2018-01-18 NOTE — HPI
53 y/o male with CAD with recent 3V CABG approximately 6 weeks ago at [facility], HTN, HLP, +TOB and h/o CVA who presented with midsternal chest pain since yesterday. Pain is constant, moderate intensity, non-radiating, no reported N/V/diaphoresis, no reported relief with NTG, and associated +SOB with PND and orthopnea. In the ER, his troponin is elevated at 1.430 and BNP 1849.

## 2018-01-18 NOTE — ASSESSMENT & PLAN NOTE
Presentation and elevation consistent with NSTEMI  NTG gtt along with ASA and statin, and beta-blocker stopped by Cardiology due to CHF  Follow markers and monitor in ICU  As per Cardiology with possible LHC tomorrow

## 2018-01-19 VITALS
BODY MASS INDEX: 25.15 KG/M2 | HEART RATE: 91 BPM | SYSTOLIC BLOOD PRESSURE: 154 MMHG | DIASTOLIC BLOOD PRESSURE: 99 MMHG | RESPIRATION RATE: 18 BRPM | OXYGEN SATURATION: 99 % | WEIGHT: 156.5 LBS | HEIGHT: 66 IN | TEMPERATURE: 97 F

## 2018-01-19 PROBLEM — I21.4 NSTEMI (NON-ST ELEVATED MYOCARDIAL INFARCTION): Status: RESOLVED | Noted: 2018-01-17 | Resolved: 2018-01-19

## 2018-01-19 PROBLEM — I50.43 ACUTE ON CHRONIC COMBINED SYSTOLIC (CONGESTIVE) AND DIASTOLIC (CONGESTIVE) HEART FAILURE: Status: RESOLVED | Noted: 2018-01-17 | Resolved: 2018-01-19

## 2018-01-19 LAB
ESTIMATED AVG GLUCOSE: 105 MG/DL
HBA1C MFR BLD HPLC: 5.3 %
MAGNESIUM SERPL-MCNC: 1.7 MG/DL
MYOGLOBIN SERPL-MCNC: 66 MCG/L

## 2018-01-19 PROCEDURE — 25000003 PHARM REV CODE 250: Performed by: INTERNAL MEDICINE

## 2018-01-19 PROCEDURE — 36415 COLL VENOUS BLD VENIPUNCTURE: CPT

## 2018-01-19 PROCEDURE — 99232 SBSQ HOSP IP/OBS MODERATE 35: CPT | Mod: ,,, | Performed by: INTERNAL MEDICINE

## 2018-01-19 PROCEDURE — 25000003 PHARM REV CODE 250: Performed by: HOSPITALIST

## 2018-01-19 PROCEDURE — 83735 ASSAY OF MAGNESIUM: CPT

## 2018-01-19 RX ORDER — CARVEDILOL 12.5 MG/1
12.5 TABLET ORAL 2 TIMES DAILY
Qty: 60 TABLET | Refills: 0 | Status: ON HOLD | OUTPATIENT
Start: 2018-01-19 | End: 2019-01-01

## 2018-01-19 RX ORDER — CLOPIDOGREL BISULFATE 75 MG/1
75 TABLET ORAL DAILY
Status: DISCONTINUED | OUTPATIENT
Start: 2018-01-19 | End: 2018-01-19 | Stop reason: HOSPADM

## 2018-01-19 RX ORDER — CLOPIDOGREL BISULFATE 75 MG/1
75 TABLET ORAL DAILY
Qty: 30 TABLET | Refills: 0 | Status: ON HOLD | OUTPATIENT
Start: 2018-01-19 | End: 2019-01-01

## 2018-01-19 RX ADMIN — ASPIRIN 81 MG: 81 TABLET, COATED ORAL at 09:01

## 2018-01-19 RX ADMIN — LISINOPRIL 10 MG: 5 TABLET ORAL at 09:01

## 2018-01-19 RX ADMIN — ATORVASTATIN CALCIUM 80 MG: 40 TABLET, FILM COATED ORAL at 09:01

## 2018-01-19 RX ADMIN — CARVEDILOL 12.5 MG: 6.25 TABLET, FILM COATED ORAL at 09:01

## 2018-01-19 NOTE — PROGRESS NOTES
[facility]  Cardiology  Progress Note    Patient Name: Abdoul Ochoa  MRN: [id number]  Admission Date: 1/17/2018  Hospital Length of Stay: 2 days  Code Status: Full Code   Attending Physician: Marline Ott MD   Primary Care Physician: Primary Doctor No  Expected Discharge Date:   Principal Problem:NSTEMI (non-ST elevated myocardial infarction)    Subjective:     Hospital Course:   Description of Procedure: Left heart catheterization with selective coronary angiography, graft restudy, right heart catheterization     Description of the findings of the procedure: Low filling pressures right and left consistent with volume depletion.  Grafts are patent including LIMA to LAD, SVG to OM and diagonal.  There is significant native vessel disease including distally occluded stent in the RCA well collateralized by the left  no obvious culprit for non-STEMI was identified     Recommendation:  -DC diuretics  -Gentle IV fluid hydration  -Wean nitroglycerin drip   -Adjust antihypertensives as needed  -Okay to transfer to telemetry and likely DC in a.m. if no problems    1/19 - Doing well - denies CP    Interval History:     Review of Systems   Constitution: Negative for decreased appetite.   HENT: Negative for ear discharge.    Eyes: Negative for blurred vision.   Endocrine: Negative for polyphagia.   Skin: Negative for nail changes.   Neurological: Negative for aphonia.   Psychiatric/Behavioral: Negative for hallucinations.     Objective:     Vital Signs (Most Recent):  Temp: 97.4 °F (36.3 °C) (01/19/18 0730)  Pulse: 82 (01/19/18 0730)  Resp: 18 (01/19/18 0730)  BP: (!) 154/99 (01/19/18 0730)  SpO2: 99 % (01/19/18 0730) Vital Signs (24h Range):  Temp:  [97.2 °F (36.2 °C)-98.3 °F (36.8 °C)] 97.4 °F (36.3 °C)  Pulse:  [] 82  Resp:  [16-69] 18  SpO2:  [96 %-100 %] 99 %  BP: (112-166)/(65-99) 154/99     Weight: 71 kg (156 lb 8.4 oz)  Body mass index is 25.26 kg/m².     SpO2: 99 %  O2 Device (Oxygen Therapy):  room air      Intake/Output Summary (Last 24 hours) at 01/19/18 0843  Last data filed at 01/19/18 0458   Gross per 24 hour   Intake          1972.25 ml   Output              750 ml   Net          1222.25 ml       Lines/Drains/Airways     Peripheral Intravenous Line                 Peripheral IV - Single Lumen 01/17/18 1653 Right Wrist 1 day                Physical Exam   Constitutional: He is oriented to person, place, and time. He appears well-developed and well-nourished.   HENT:   Head: Normocephalic and atraumatic.   Eyes: Conjunctivae and EOM are normal. Pupils are equal, round, and reactive to light.   Neck: Normal range of motion. Neck supple. No thyromegaly present.   Cardiovascular: Normal rate, regular rhythm and normal heart sounds.    No murmur heard.  Pulmonary/Chest: Effort normal and breath sounds normal. No respiratory distress. He has no wheezes. He has no rales. He exhibits no tenderness.   Abdominal: Soft. Bowel sounds are normal.   Musculoskeletal: He exhibits no edema.   Neurological: He is alert and oriented to person, place, and time.   Skin: Skin is warm.   Psychiatric: He has a normal mood and affect. His behavior is normal.       Significant Labs: All pertinent lab results from the last 24 hours have been reviewed.    Significant Imaging: Echocardiogram:   2D echo with color flow doppler:   Results for orders placed or performed during the hospital encounter of 01/17/18   2D echo with color flow doppler   Result Value Ref Range    EF 20 (A) 55 - 65    Mitral Valve Regurgitation MODERATE TO SEVERE (A)     Est. PA Systolic Pressure 45.45 (A)     Pericardial Effusion NONE     Tricuspid Valve Regurgitation MILD TO MODERATE      Assessment and Plan:     Brief HPI:     * NSTEMI (non-ST elevated myocardial infarction)    [facility] as above - medical Rx        Tobacco abuse    Counseled and says that he has quit        Cocaine abuse    Per history  Check U tox        Mixed hyperlipidemia    On statin         Hypertension    Add amlodipine  When necessary hydralazine  Continue medicines as above  May need transfer to ICU for nitroglycerin drip        Acute on chronic combined systolic (congestive) and diastolic (congestive) heart failure    Severe biventricular failure  DC metoprolol   We'll transfer to the ICU for observation  May require milrinone            VTE Risk Mitigation         Ordered     Medium Risk of VTE  Once      01/17/18 1139     Place sequential compression device  Until discontinued      01/17/18 1139     Place CHAPITO hose  Until discontinued      01/17/18 1139        Ok for d/c  F/U with Dr Laureano 1 week    Garland Mccarty MD  Cardiology  [facility]

## 2018-01-19 NOTE — PROVIDER TRANSFER
Mr. Ochoa was admitted for concern for ACS. He is generally a poor historian but reports having a CABG 6 weeks ago at [facility]. He is now out of some medications but is unsure of which ones or if he has refills at his pharmacy. He was taken for C 1/18 that showed patent grafts and significant native artery disease but no obvious culprit of his NSTEMI. No PCI necessary. He had low filling pressures visualized on LHC so diuretics were discontinued. NTG infusion was weaned. He was stable for transfer to telemetry 1/18 for further medication adjustment and monitoring.  Possibly discharge 1/19/18. SW asked to assure he has refills at his pharmacy and close follow up with cards.

## 2018-01-19 NOTE — PROGRESS NOTES
Discharge orders noted, instructions discussed with patient, verbalized understanding. IV and tele box discontinued, patient awaiting transportation home.

## 2018-01-19 NOTE — NURSING
0755- bedside rounding report received from alicia poole rn on patients progress and updated handoff report sheet received too. Patient awake denies pain . Assessment completed and plan today reviewed with patient no hematoma to right groin site dressing intact and has light reddish brown stain on it. Call light in reach and head of bed elevated instructed to call for help . Telemetry box in use and working properly .     1033- patient being discharged to home denies pain when asked. Removed telemetry and iv site removed too. Patient reports he already received influenza vaccine. Denies pain right groin site soft no hematoma no bruising or masses palpable no streaks or other signs of inflammation. Tolerated breakfast meal.

## 2018-01-19 NOTE — PROGRESS NOTES
[facility] Medicine  Progress Note    Patient Name: Abdoul Ochoa  MRN: [id number]  Patient Class: IP- Inpatient   Admission Date: 1/17/2018  Length of Stay: 1 days  Attending Physician: Marce Shah MD  Primary Care Provider: Primary Doctor No        Subjective:     Principal Problem:NSTEMI (non-ST elevated myocardial infarction)    HPI:  53 y/o male with CAD with recent 3V CABG approximately 6 weeks ago at [facility], HTN, HLP, +TOB and h/o CVA who presented with midsternal chest pain since yesterday. Pain is constant, moderate intensity, non-radiating, no reported N/V/diaphoresis, no reported relief with NTG, and associated +SOB with PND and orthopnea. In the ER, his troponin is elevated at 1.430 and BNP 1849.    Hospital Course:  Mr. Ochoa was admitted for concern for ACS. He is generally a poor historian but reports having a CABG 6 weeks ago at [facility]. He is now out of some medications but is unsure of which ones or if he has refills at his pharmacy. He was taken for LHC 1/18 that showed patent grafts and significant native artery disease but no obvious culprit of his NSTEMI. No PCI necessary. He had low filling pressures visualized on LHC so diuretics were discontinued. NTG infusion was weaned. He was stable for transfer to telemetry 1/18 for further medication adjustment and monitoring.    Interval History:  [facility] without PCI    Review of Systems   Constitutional: Negative for chills and fever.   HENT: Negative for sore throat.    Eyes: Negative for visual disturbance.   Respiratory: Negative for chest tightness and shortness of breath.    Cardiovascular: Positive for leg swelling. Negative for chest pain.   Gastrointestinal: Negative for abdominal pain, nausea and vomiting.   Endocrine: Negative for polyuria.   Genitourinary: Negative for dysuria.   Musculoskeletal: Negative for arthralgias.   Skin: Negative for rash.   Neurological: Negative for seizures and  syncope.   Psychiatric/Behavioral: Negative for agitation.     Objective:     Vital Signs (Most Recent):  Temp: 98.3 °F (36.8 °C) (01/18/18 1944)  Pulse: 94 (01/18/18 1944)  Resp: 18 (01/18/18 1944)  BP: (!) 143/65 (01/18/18 1944)  SpO2: 98 % (01/18/18 1944) Vital Signs (24h Range):  Temp:  [97.2 °F (36.2 °C)-98.6 °F (37 °C)] 98.3 °F (36.8 °C)  Pulse:  [] 94  Resp:  [14-69] 18  SpO2:  [96 %-100 %] 98 %  BP: (112-174)/() 143/65     Weight: 70 kg (154 lb 5.2 oz)  Body mass index is 24.91 kg/m².    Physical Exam   Constitutional: He is oriented to person, place, and time. He appears well-developed.   HENT:   Head: Normocephalic and atraumatic.   Old trach scar   Eyes: EOM are normal. Pupils are equal, round, and reactive to light.   Neck: Normal range of motion. Neck supple.   Cardiovascular: Normal rate and regular rhythm.    Well healed sternotomy scar   Pulmonary/Chest:   Course, no crackles appreciated   Abdominal: Soft. Bowel sounds are normal.   Musculoskeletal: Normal range of motion. He exhibits edema.   Neurological: He is alert and oriented to person, place, and time.   Skin: Skin is warm and dry. Capillary refill takes less than 2 seconds. He is not diaphoretic.   Psychiatric: He has a normal mood and affect.   Nursing note and vitals reviewed.        CRANIAL NERVES     CN III, IV, VI   Pupils are equal, round, and reactive to light.  Extraocular motions are normal.        Significant Labs: All pertinent labs within the past 24 hours have been reviewed.    Significant Imaging: I have reviewed and interpreted all pertinent imaging results/findings within the past 24 hours.    Assessment/Plan:      * NSTEMI (non-ST elevated myocardial infarction)    Presentation and elevation consistent with NSTEMI  NTG gtt along with ASA and statin, and beta-blocker stopped by Cardiology due to CHF  LHC as above. Med mgmt. No PCI needed.  Appreciate cards recs.        Acute on chronic combined systolic  (congestive) and diastolic (congestive) heart failure    Severe biventricular heart failure   ECHO done today with EF=20-25% and depressed right ventricular function  Pulmonary HTN with pressure of 45 mmHg  NTG gtt and diuresis weaned off after LHC as low filling pressures  Strict I/Os and daily weights        Anemia of chronic disease    No reported bleeding and also likely related to recent surgery  Will monitor        Coronary artery disease    As discussed above.        History of CVA (cerebrovascular accident)    Continue ASA and statin        Tobacco abuse    Smoking cessation counseling done  Patient report he is ready to quit        Cocaine abuse    Reported in the past, with current denied use  Utox negative for cocaine        Mixed hyperlipidemia    High dose statin  Fasting lipids 156/38/101/81        Hypertension    Poorly controlled and transferred to ICU from floor  NTG gtt and medications adjusted per Cardiology  Beta-blocker stopped due to biventricular heart failure  Monitor and adjust meds          VTE Risk Mitigation         Ordered     Medium Risk of VTE  Once      01/17/18 1139     Place sequential compression device  Until discontinued      01/17/18 1139     Place CHAPITO hose  Until discontinued      01/17/18 1139              Marce Shah MD  Department of Hospital Medicine   [facility]

## 2018-01-19 NOTE — ASSESSMENT & PLAN NOTE
Severe biventricular heart failure   ECHO done today with EF=20-25% and depressed right ventricular function  Pulmonary HTN with pressure of 45 mmHg  NTG gtt and diuresis weaned off after LHC as low filling pressures  Strict I/Os and daily weights

## 2018-01-19 NOTE — HOSPITAL COURSE
Mr. Ochoa was admitted for CP consistent with ACS. He is generally a poor historian but reported having a CABG 6 weeks ago at [place]. He was hypertensive and had signs of biventricular heart failure so he was transferred to the ICU and placed on tridil gtt, and started on diuresis with IV lasix. He was seen by Cardiology and was taken for LHC on 1/18 that showed patent grafts and significant native artery disease but no obvious culprit of his NSTEMI. No PCI was necessary. He had low filling pressures visualized on LHC so diuretics were discontinued. NTG infusion was weaned off. He was stable for transfer to telemetry on 1/18 for further medication adjustment and monitoring. Cardiology recommended medical management only. He was also counseled on smoking cessation. He did well overnight and was stable for discharge with Cardiology follow up. Pt was educated multiple times on need for adherence to medication regimen and to not miss follow up appointments.

## 2018-01-19 NOTE — ASSESSMENT & PLAN NOTE
Poorly controlled and transferred to ICU from floor  NTG gtt and medications adjusted per Cardiology  Beta-blocker stopped due to biventricular heart failure  Monitor and adjust meds

## 2018-01-19 NOTE — PROGRESS NOTES
Call placed to [facility] to inquire if she can submit pt to [facility] for possible enrollment.    [phone]- call placed to Cardiology Appointment Line to schedule follow up with Dr Laureano at time of discharge. Appointment scheduled and entered into follow up tab to print on AVS at time of discharge.

## 2018-01-19 NOTE — NURSING
Report received; pt awake and alert resp even and unlabored no acute distress noted;   Dressing to right groin dry and intact; drainage present on dressing; no swelling, no bruising noted; pt denies pain; no needs voiced

## 2018-01-19 NOTE — DISCHARGE SUMMARY
[facility] Medicine  Discharge Summary      Patient Name: Abdoul Ochoa  MRN: [id number]  Admission Date: 1/17/2018  Hospital Length of Stay: 2 days  Discharge Date and Time:  01/19/2018 10:22 AM  Attending Physician: Marline Ott MD   Discharging Provider: Marline Ott MD  Primary Care Provider: Primary Doctor No      HPI:   51 y/o male with CAD with recent 3V CABG approximately 6 weeks ago at [facility], HTN, HLP, +TOB and h/o CVA who presented with midsternal chest pain since yesterday. Pain is constant, moderate intensity, non-radiating, no reported N/V/diaphoresis, no reported relief with NTG, and associated +SOB with PND and orthopnea. In the ER, his troponin is elevated at 1.430 and BNP 1849.    Procedure(s) (LRB):  Heart Cath With Grafts-Left (N/A)      Hospital Course:   Mr. Ochoa was admitted for CP consistent with ACS. He is generally a poor historian but reported having a CABG 6 weeks ago at [place]. He was hypertensive and had signs of biventricular heart failure so he was transferred to the ICU and placed on tridil gtt, and started on diuresis with IV lasix. He was seen by Cardiology and was taken for LHC on 1/18 that showed patent grafts and significant native artery disease but no obvious culprit of his NSTEMI. No PCI was necessary. He had low filling pressures visualized on LHC so diuretics were discontinued. NTG infusion was weaned off. He was stable for transfer to telemetry on 1/18 for further medication adjustment and monitoring. Cardiology recommended medical management only. He was also counseled on smoking cessation. He did well overnight and was stable for discharge with Cardiology follow up. Pt was educated multiple times on need for adherence to medication regimen and to not miss follow up appointments.      Consults:   Consults         Status Ordering Provider     Inpatient consult to Cardiology  Once     Provider:  Charlie Laureano MD     Completed VAMSHI HWANG        Service: Hospital Medicine    Final Active Diagnoses:    Diagnosis Date Noted POA    Coronary artery disease [I25.10] 01/18/2018 Yes     Chronic    Hypertension [I10] 01/17/2018 Yes     Chronic    Mixed hyperlipidemia [E78.2] 01/17/2018 Yes     Chronic    History of CVA (cerebrovascular accident) [Z86.73] 01/18/2018 Not Applicable     Chronic    Anemia of chronic disease [D63.8] 01/18/2018 Yes     Chronic    Cocaine abuse [F14.10] 01/17/2018 Yes     Chronic    Tobacco abuse [Z72.0] 01/17/2018 Yes     Chronic      Problems Resolved During this Admission:    Diagnosis Date Noted Date Resolved POA    PRINCIPAL PROBLEM:  NSTEMI (non-ST elevated myocardial infarction) [I21.4] 01/17/2018 01/19/2018 Yes    Acute on chronic combined systolic (congestive) and diastolic (congestive) heart failure [I50.43] 01/17/2018 01/19/2018 Yes       Discharged Condition: good    Disposition: Home or Self Care    Follow Up:  Follow-up Information     Charlie Laureano MD In 1 week.    Specialties:  INTERVENTIONAL CARDIOLOGY, Cardiology  Contact information:  [address] [place], [place] [id number] [phone]                 Patient Instructions:     Diet Cardiac     Activity as tolerated         Significant Diagnostic Studies:  ECHO (1/17/18):    1 - Severely depressed left ventricular systolic function (EF 20-25%).     2 - Eccentric hypertrophy.     3 - Severely depressed right ventricular systolic function .     4 - Pulmonary hypertension. The estimated PA systolic pressure is 45 mmHg.     5 - Moderate to severe mitral regurgitation.     6 - Intermediate central venous pressure.    Pending Diagnostic Studies:     Procedure Component Value Units Date/Time    Myoglobin, serum [[id number]] Collected:  01/18/18 0008    Order Status:  Sent Lab Status:  In process Updated:  01/18/18 0037    Specimen:  Blood from Blood          Medications:  Reconciled Home Medications:   Current Discharge  Medication List      START taking these medications    Details   carvedilol (COREG) 12.5 MG tablet Take 1 tablet (12.5 mg total) by mouth 2 (two) times daily.  Qty: 60 tablet, Refills: 0      clopidogrel (PLAVIX) 75 mg tablet Take 1 tablet (75 mg total) by mouth once daily.  Qty: 30 tablet, Refills: 0         CONTINUE these medications which have NOT CHANGED    Details   aspirin 81 MG Chew Take 81 mg by mouth once daily.      atorvastatin (LIPITOR) 10 MG tablet Take by mouth once daily.      lisinopril 10 MG tablet Take 10 mg by mouth once daily.      nitroGLYCERIN (NITROSTAT) 0.4 MG SL tablet Place 0.4 mg under the tongue every 5 (five) minutes as needed for Chest pain.             Indwelling Lines/Drains at time of discharge:   Lines/Drains/Airways          No matching active lines, drains, or airways          Time spent on the discharge of patient: 35 minutes  Patient was seen and examined on the date of discharge and determined to be suitable for discharge.         Marline Ott MD  Department of Hospital Medicine  [facility]

## 2018-01-19 NOTE — HOSPITAL COURSE
Description of Procedure: Left heart catheterization with selective coronary angiography, graft restudy, right heart catheterization     Description of the findings of the procedure: Low filling pressures right and left consistent with volume depletion.  Grafts are patent including LIMA to LAD, SVG to OM and diagonal.  There is significant native vessel disease including distally occluded stent in the RCA well collateralized by the left  no obvious culprit for non-STEMI was identified     Recommendation:  -DC diuretics  -Gentle IV fluid hydration  -Wean nitroglycerin drip   -Adjust antihypertensives as needed  -Okay to transfer to telemetry and likely DC in a.m. if no problems    1/19 - Doing well - denies CP

## 2018-01-19 NOTE — ASSESSMENT & PLAN NOTE
Presentation and elevation consistent with NSTEMI  NTG gtt along with ASA and statin, and beta-blocker stopped by Cardiology due to CHF  LHC as above. Med mgmt. No PCI needed.  Appreciate cards recs.

## 2018-01-19 NOTE — SUBJECTIVE & OBJECTIVE
Interval History:  LHC without PCI    Review of Systems   Constitutional: Negative for chills and fever.   HENT: Negative for sore throat.    Eyes: Negative for visual disturbance.   Respiratory: Negative for chest tightness and shortness of breath.    Cardiovascular: Positive for leg swelling. Negative for chest pain.   Gastrointestinal: Negative for abdominal pain, nausea and vomiting.   Endocrine: Negative for polyuria.   Genitourinary: Negative for dysuria.   Musculoskeletal: Negative for arthralgias.   Skin: Negative for rash.   Neurological: Negative for seizures and syncope.   Psychiatric/Behavioral: Negative for agitation.     Objective:     Vital Signs (Most Recent):  Temp: 98.3 °F (36.8 °C) (01/18/18 1944)  Pulse: 94 (01/18/18 1944)  Resp: 18 (01/18/18 1944)  BP: (!) 143/65 (01/18/18 1944)  SpO2: 98 % (01/18/18 1944) Vital Signs (24h Range):  Temp:  [97.2 °F (36.2 °C)-98.6 °F (37 °C)] 98.3 °F (36.8 °C)  Pulse:  [] 94  Resp:  [14-69] 18  SpO2:  [96 %-100 %] 98 %  BP: (112-174)/() 143/65     Weight: 70 kg (154 lb 5.2 oz)  Body mass index is 24.91 kg/m².    Physical Exam   Constitutional: He is oriented to person, place, and time. He appears well-developed.   HENT:   Head: Normocephalic and atraumatic.   Old trach scar   Eyes: EOM are normal. Pupils are equal, round, and reactive to light.   Neck: Normal range of motion. Neck supple.   Cardiovascular: Normal rate and regular rhythm.    Well healed sternotomy scar   Pulmonary/Chest:   Course, no crackles appreciated   Abdominal: Soft. Bowel sounds are normal.   Musculoskeletal: Normal range of motion. He exhibits edema.   Neurological: He is alert and oriented to person, place, and time.   Skin: Skin is warm and dry. Capillary refill takes less than 2 seconds. He is not diaphoretic.   Psychiatric: He has a normal mood and affect.   Nursing note and vitals reviewed.        CRANIAL NERVES     CN III, IV, VI   Pupils are equal, round, and reactive to  light.  Extraocular motions are normal.        Significant Labs: All pertinent labs within the past 24 hours have been reviewed.    Significant Imaging: I have reviewed and interpreted all pertinent imaging results/findings within the past 24 hours.

## 2018-01-24 LAB — CORONARY STENOSIS: ABNORMAL

## 2018-01-30 NOTE — PHYSICIAN QUERY
PT Name: Abdoul Ochoa  MR #: [id number]     Physician Query Form - Documentation Clarification      CDS/: Elvia Hung               Contact information: [email or web address]     This form is a permanent document in the medical record.     Query Date: January 30, 2018    By submitting this query, we are merely seeking further clarification of documentation. Please utilize your independent clinical judgment when addressing the question(s) below.    The Medical record reflects the following:    Supporting Clinical Findings Location in Medical Record   Pulmonary HTN with pressure of 45 mmHg    1 - Severely depressed left ventricular systolic function (EF 20-25%).   2 - Eccentric hypertrophy.   3 - Severely depressed right ventricular systolic function .   4 - Pulmonary hypertension. The estimated PA systolic pressure is 45 mmHg.   5 - Moderate to severe mitral regurgitation.   6 - Intermediate central venous pressure.    H&P 1/17    2D Echo 1/17                                                                                Doctor, Please specify diagnosis or diagnoses associated with above clinical findings.    Provider Use Only    [ ] Primary pulmonary hypertension (Group 1)   [ ] Other Secondary pulmonary hypertension (Group 5)   [ ] Secondary pulmonary arterial hypertension due to: _______________   [X ] Pulmonary hypertension due to Left heart disease (Group 2)   [ ] Pulmonary hypertension due to Lung disease and hypoxia (Group 3)   [ ] Chronic thromboembolic pulmonary hypertension (Group 4)   [ ] Pulmonary hypertension, unspecified   [ ] Other: ___________________                                                                                                         [  ] Clinically undetermined

## 2018-03-02 ENCOUNTER — HOSPITAL ENCOUNTER (EMERGENCY)
Facility: HOSPITAL | Age: 53
Discharge: HOME | End: 2018-03-03
Attending: EMERGENCY MEDICINE
Payer: MEDICAID

## 2018-03-02 VITALS
TEMPERATURE: 98 F | SYSTOLIC BLOOD PRESSURE: 154 MMHG | WEIGHT: 170 LBS | HEIGHT: 72 IN | RESPIRATION RATE: 18 BRPM | DIASTOLIC BLOOD PRESSURE: 98 MMHG | OXYGEN SATURATION: 99 % | BODY MASS INDEX: 23.03 KG/M2 | HEART RATE: 74 BPM

## 2018-03-02 DIAGNOSIS — S09.90XA TRAUMATIC INJURY OF HEAD, INITIAL ENCOUNTER: ICD-10-CM

## 2018-03-02 DIAGNOSIS — F10.920 ALCOHOLIC INTOXICATION WITHOUT COMPLICATION: Primary | ICD-10-CM

## 2018-03-02 PROCEDURE — 99284 EMERGENCY DEPT VISIT MOD MDM: CPT

## 2018-03-03 NOTE — ED NOTES
"Patient up and walking over to the main ed's area to view his ability to walk with out difficulty.  No difficulty noted. Pt. Verb. " I am ready to go home"   "

## 2018-03-03 NOTE — ED NOTES
Patient will be monitor for his ability to ambulate without difficulty. Prior to discharge to home per Dr. Lopez

## 2018-03-03 NOTE — ED PROVIDER NOTES
Encounter Date: 3/2/2018    SCRIBE #1 NOTE: I, Calistamonica Michel, am scribing for, and in the presence of,  Dimas Lopez MD. I have scribed the following portions of the note - Other sections scribed: HPI, ROS and PE.       History     Chief Complaint   Patient presents with    Fall     Has been drinking alcohol all day. Fell and hit back of head on ground. Has 1 inch abrasion to back of head.      CC: Fall    HPI: This 52 y.o. male with a medical history of hypertension, myocardial infarction, coronary artery disease, stroke and hyperlipidemia presents to the ED via EMS transportation s/p a mechanical fall that occurred today. Pt reports that he fell and hit the back of his head (abrasion present to the area). He notes that he has consumed alcohol today. Pt reports that he underwent open heart surgery 1.5x months ago and notes that his chest presently hurts. Pt denies loss of consciousness and drug use. No other associated symptoms.           The history is provided by the patient. No  was used.     Review of patient's allergies indicates:  No Known Allergies  Past Medical History:   Diagnosis Date    Coronary artery disease     Hyperlipidemia     Hypertension     MI (myocardial infarction)     Mix2    Stroke      Past Surgical History:   Procedure Laterality Date    BYPASS GRAFT      TRACHEOSTOMY CLOSURE       Family History   Problem Relation Age of Onset    Heart disease Mother     Hypertension Mother     Heart disease Father     Hypertension Father      Social History   Substance Use Topics    Smoking status: Light Tobacco Smoker     Packs/day: 0.25    Smokeless tobacco: Never Used    Alcohol use No     Review of Systems   Constitutional: Negative for chills and fever.   HENT: Negative for congestion, ear pain, rhinorrhea and sore throat.    Eyes: Negative for pain and visual disturbance.   Respiratory: Negative for cough and shortness of breath.    Cardiovascular:  Positive for chest pain.   Gastrointestinal: Negative for abdominal pain, diarrhea, nausea and vomiting.   Genitourinary: Negative for dysuria.   Musculoskeletal: Negative for back pain and neck pain.   Skin: Positive for wound (abrasion to the back of the head). Negative for rash.   Neurological: Negative for headaches.       Physical Exam     Initial Vitals [03/02/18 1836]   BP Pulse Resp Temp SpO2   (!) 154/98 74 18 97.6 °F (36.4 °C) 99 %      MAP       116.67         Physical Exam    Nursing note and vitals reviewed.  Constitutional: Vital signs are normal. He appears well-developed and well-nourished. He is active.  Non-toxic appearance. No distress.   Pt appears intoxicated.   HENT:   Head: Normocephalic and atraumatic.   Mouth/Throat: He has dentures (partial).   There is a superficial abrasion as well as a soft hematoma present to the occiput.      Eyes: EOM are normal.   Pt has blood shot eyes.   Neck: Trachea normal. Neck supple.   There is a tracheostomy scar.   Cardiovascular: Normal rate and regular rhythm.   Pulmonary/Chest: Breath sounds normal. No respiratory distress.   Abdominal: Soft. Normal appearance and bowel sounds are normal. He exhibits no distension. There is no tenderness.   Musculoskeletal: Normal range of motion. He exhibits no edema.   Neurological: He is alert.   Speech is slurred.   Skin: Skin is warm, dry and intact.   Psychiatric: He has a normal mood and affect. His speech is tangential.         ED Course   Procedures  Labs Reviewed - No data to display          Medical Decision Making:   ED Management:  Benign exam, sleeping comfortably. Refused to call family. Will watch until sober and safe to go home.             Scribe Attestation:   Scribe #1: I performed the above scribed service and the documentation accurately describes the services I performed. I attest to the accuracy of the note.    Attending Attestation:           Physician Attestation for Scribe:  Physician  Attestation Statement for Scribe #1: I, Dimas Lopez MD, reviewed documentation, as scribed by Calista Michel in my presence, and it is both accurate and complete.                    Clinical Impression:   The primary encounter diagnosis was Alcoholic intoxication without complication. A diagnosis of Traumatic injury of head, initial encounter was also pertinent to this visit.                           Dimas Lopez MD  03/02/18 1709

## 2018-03-03 NOTE — ED TRIAGE NOTES
Patient fell at home. Hit the back of his head and  Superficial contusion to occopit.  No sutures needed.  C.t. Done prior to coming to a room.

## 2018-03-03 NOTE — ED NOTES
Patient looking for his teeth,  Did not have any teeth or denture with him when he presented.   Pt. Ambulated around the desk and again into the main ED.  Dr. Pittman. Observed him ambulating and ok with him ambulating out to home.  Pt. Escorted to the discharge desk and walked out to the lobby waiting area.

## 2018-03-25 ENCOUNTER — HOSPITAL ENCOUNTER (EMERGENCY)
Facility: HOSPITAL | Age: 53
Discharge: HOME | End: 2018-03-26
Attending: EMERGENCY MEDICINE
Payer: MEDICAID

## 2018-03-25 DIAGNOSIS — F10.920 ALCOHOLIC INTOXICATION WITHOUT COMPLICATION: Primary | ICD-10-CM

## 2018-03-25 DIAGNOSIS — R07.9 CHEST PAIN: ICD-10-CM

## 2018-03-25 LAB
ALBUMIN SERPL BCP-MCNC: 3.8 G/DL
ALP SERPL-CCNC: 78 U/L
ALT SERPL W/O P-5'-P-CCNC: 32 U/L
AMPHET+METHAMPHET UR QL: NEGATIVE
ANION GAP SERPL CALC-SCNC: 13 MMOL/L
ANISOCYTOSIS BLD QL SMEAR: SLIGHT
AST SERPL-CCNC: 20 U/L
BARBITURATES UR QL SCN>200 NG/ML: NEGATIVE
BASOPHILS # BLD AUTO: 0.07 K/UL
BASOPHILS NFR BLD: 1 %
BENZODIAZ UR QL SCN>200 NG/ML: NEGATIVE
BILIRUB SERPL-MCNC: 0.6 MG/DL
BILIRUB UR QL STRIP: NEGATIVE
BNP SERPL-MCNC: 1039 PG/ML
BUN SERPL-MCNC: 15 MG/DL
BZE UR QL SCN: NEGATIVE
CALCIUM SERPL-MCNC: 9.6 MG/DL
CANNABINOIDS UR QL SCN: NEGATIVE
CHLORIDE SERPL-SCNC: 105 MMOL/L
CLARITY UR: CLEAR
CO2 SERPL-SCNC: 21 MMOL/L
COLOR UR: COLORLESS
CREAT SERPL-MCNC: 1.5 MG/DL
CREAT UR-MCNC: 20.7 MG/DL
DIFFERENTIAL METHOD: ABNORMAL
EOSINOPHIL # BLD AUTO: 0.1 K/UL
EOSINOPHIL NFR BLD: 0.8 %
ERYTHROCYTE [DISTWIDTH] IN BLOOD BY AUTOMATED COUNT: 19.7 %
EST. GFR  (AFRICAN AMERICAN): 60 ML/MIN/1.73 M^2
EST. GFR  (NON AFRICAN AMERICAN): 52 ML/MIN/1.73 M^2
ETHANOL SERPL-MCNC: 288 MG/DL
GIANT PLATELETS BLD QL SMEAR: PRESENT
GLUCOSE SERPL-MCNC: 90 MG/DL
GLUCOSE UR QL STRIP: NEGATIVE
HCT VFR BLD AUTO: 36.3 %
HGB BLD-MCNC: 11.8 G/DL
HGB UR QL STRIP: NEGATIVE
HYPOCHROMIA BLD QL SMEAR: ABNORMAL
KETONES UR QL STRIP: NEGATIVE
LEUKOCYTE ESTERASE UR QL STRIP: NEGATIVE
LYMPHOCYTES # BLD AUTO: 3.2 K/UL
LYMPHOCYTES NFR BLD: 42.9 %
MCH RBC QN AUTO: 23.8 PG
MCHC RBC AUTO-ENTMCNC: 32.5 G/DL
MCV RBC AUTO: 73 FL
METHADONE UR QL SCN>300 NG/ML: NEGATIVE
MONOCYTES # BLD AUTO: 1.2 K/UL
MONOCYTES NFR BLD: 15.7 %
NEUTROPHILS # BLD AUTO: 2.9 K/UL
NEUTROPHILS NFR BLD: 39.7 %
NITRITE UR QL STRIP: NEGATIVE
OPIATES UR QL SCN: NEGATIVE
OVALOCYTES BLD QL SMEAR: ABNORMAL
PCP UR QL SCN>25 NG/ML: NEGATIVE
PH UR STRIP: 6 [PH] (ref 5–8)
PLATELET # BLD AUTO: 209 K/UL
PLATELET BLD QL SMEAR: ABNORMAL
PMV BLD AUTO: ABNORMAL FL
POIKILOCYTOSIS BLD QL SMEAR: SLIGHT
POTASSIUM SERPL-SCNC: 3.2 MMOL/L
PROT SERPL-MCNC: 6.8 G/DL
PROT UR QL STRIP: NEGATIVE
RBC # BLD AUTO: 4.95 M/UL
SODIUM SERPL-SCNC: 139 MMOL/L
SP GR UR STRIP: 1 (ref 1–1.03)
TARGETS BLD QL SMEAR: ABNORMAL
TOXICOLOGY INFORMATION: ABNORMAL
TROPONIN I SERPL DL<=0.01 NG/ML-MCNC: 0.96 NG/ML
TROPONIN I SERPL DL<=0.01 NG/ML-MCNC: 0.97 NG/ML
URN SPEC COLLECT METH UR: ABNORMAL
UROBILINOGEN UR STRIP-ACNC: NEGATIVE EU/DL
WBC # BLD AUTO: 7.34 K/UL

## 2018-03-25 PROCEDURE — 85025 COMPLETE CBC W/AUTO DIFF WBC: CPT

## 2018-03-25 PROCEDURE — 93005 ELECTROCARDIOGRAM TRACING: CPT

## 2018-03-25 PROCEDURE — 80307 DRUG TEST PRSMV CHEM ANLYZR: CPT

## 2018-03-25 PROCEDURE — 81003 URINALYSIS AUTO W/O SCOPE: CPT | Mod: 59

## 2018-03-25 PROCEDURE — 80053 COMPREHEN METABOLIC PANEL: CPT

## 2018-03-25 PROCEDURE — 93010 ELECTROCARDIOGRAM REPORT: CPT | Mod: ,,, | Performed by: INTERNAL MEDICINE

## 2018-03-25 PROCEDURE — 99285 EMERGENCY DEPT VISIT HI MDM: CPT

## 2018-03-25 PROCEDURE — 25000003 PHARM REV CODE 250: Performed by: EMERGENCY MEDICINE

## 2018-03-25 PROCEDURE — 84484 ASSAY OF TROPONIN QUANT: CPT

## 2018-03-25 PROCEDURE — 80320 DRUG SCREEN QUANTALCOHOLS: CPT

## 2018-03-25 PROCEDURE — 83880 ASSAY OF NATRIURETIC PEPTIDE: CPT

## 2018-03-25 RX ORDER — ASPIRIN 325 MG
325 TABLET ORAL
Status: COMPLETED | OUTPATIENT
Start: 2018-03-25 | End: 2018-03-25

## 2018-03-25 RX ORDER — POTASSIUM CHLORIDE 20 MEQ/15ML
40 SOLUTION ORAL ONCE
Status: COMPLETED | OUTPATIENT
Start: 2018-03-25 | End: 2018-03-25

## 2018-03-25 RX ORDER — SODIUM CHLORIDE 9 MG/ML
500 INJECTION, SOLUTION INTRAVENOUS
Status: COMPLETED | OUTPATIENT
Start: 2018-03-25 | End: 2018-03-25

## 2018-03-25 RX ADMIN — ASPIRIN 325 MG ORAL TABLET 325 MG: 325 PILL ORAL at 07:03

## 2018-03-25 RX ADMIN — SODIUM CHLORIDE 500 ML: 0.9 INJECTION, SOLUTION INTRAVENOUS at 07:03

## 2018-03-25 RX ADMIN — POTASSIUM CHLORIDE 40 MEQ: 20 SOLUTION ORAL at 07:03

## 2018-03-25 NOTE — ED NOTES
Patient is noted leaving his room with his cigarette and a lighter in his hand. Patient states that he want to use restroom. When giving direction to restroom, patient went back to his room. Charged nurse notified and called security.

## 2018-03-25 NOTE — ED PROVIDER NOTES
Encounter Date: 3/25/2018    SCRIBE #1 NOTE: I, Basil Modi, am scribing for, and in the presence of,  Johanna Dodge MD. I have scribed the following portions of the note - Other sections scribed: HPI, ROS, PE.       History     Chief Complaint   Patient presents with    Alcohol Intoxication     Patient presents to the Ed via ems. Ems reports that patient was found on side the road. Patient denies drug or alcohol use.     CC: Alcohol Intoxication    HPI: This 53 y.o. Male with PMHx HTN, MI, CAD, CVA, HLD, and PSHx bypass graft and tracheostomy closure presents to the ED via EMS for emergent evaluation of alcohol intoxication. Pt admits to drinking 1 quart of alcohol today. Pt reports non-exertional, non-radiating chest pain which began yesterday. Pt denies SOB, abd pain, nausea, and vomiting. No prior tx reported. Pt denies recent falls, injuries, and hitting his head. Pt denies drug use. Pt reports hx of CABG.      The history is provided by the patient. The history is limited by the condition of the patient. No  was used.     Review of patient's allergies indicates:  No Known Allergies  Past Medical History:   Diagnosis Date    Coronary artery disease     Hyperlipidemia     Hypertension     MI (myocardial infarction)     Mix2    Stroke      Past Surgical History:   Procedure Laterality Date    BYPASS GRAFT      TRACHEOSTOMY CLOSURE       Family History   Problem Relation Age of Onset    Heart disease Mother     Hypertension Mother     Heart disease Father     Hypertension Father      Social History   Substance Use Topics    Smoking status: Light Tobacco Smoker     Packs/day: 0.25    Smokeless tobacco: Never Used    Alcohol use No     Review of Systems   Constitutional: Negative for fever.   HENT: Negative for sore throat.    Respiratory: Negative for shortness of breath.    Cardiovascular: Positive for chest pain.   Gastrointestinal: Negative for abdominal pain, nausea and  vomiting.   Genitourinary: Negative for dysuria.   Musculoskeletal: Negative for back pain.   Skin: Negative for rash.   Neurological: Negative for weakness.   Hematological: Does not bruise/bleed easily.       Physical Exam     Initial Vitals [03/25/18 1729]   BP Pulse Resp Temp SpO2   110/73 76 18 97.9 °F (36.6 °C) 98 %      MAP       85.33         Physical Exam    Nursing note and vitals reviewed.  Constitutional: Vital signs are normal. He appears well-developed and well-nourished. He is active.  Non-toxic appearance. No distress.   +AOB   HENT:   Head: Normocephalic and atraumatic.   Mouth/Throat: Oropharynx is clear and moist.   Eyes: EOM are normal. Pupils are equal, round, and reactive to light. No scleral icterus.   Neck: Trachea normal. Neck supple.   Cardiovascular: Normal rate, regular rhythm and normal heart sounds.   Pulmonary/Chest: Effort normal and breath sounds normal. No respiratory distress.   Abdominal: Soft. Normal appearance and bowel sounds are normal. He exhibits no distension. There is no tenderness.   Musculoskeletal: Normal range of motion. He exhibits no edema.   Neurological: He is alert and oriented to person, place, and time. He has normal strength. No cranial nerve deficit or sensory deficit.   Skin: Skin is warm, dry and intact.   +tracheostomy scar, sternotomy scar   Psychiatric:   Clinically intoxicated         ED Course   Procedures  Labs Reviewed   CBC W/ AUTO DIFFERENTIAL - Abnormal; Notable for the following:        Result Value    Hemoglobin 11.8 (*)     Hematocrit 36.3 (*)     MCV 73 (*)     MCH 23.8 (*)     RDW 19.7 (*)     Mono # 1.2 (*)     Mono% 15.7 (*)     All other components within normal limits   COMPREHENSIVE METABOLIC PANEL - Abnormal; Notable for the following:     Potassium 3.2 (*)     CO2 21 (*)     Creatinine 1.5 (*)     eGFR if non  52 (*)     All other components within normal limits   TROPONIN I - Abnormal; Notable for the following:      Troponin I 0.961 (*)     All other components within normal limits   TROPONIN I - Abnormal; Notable for the following:     Troponin I 0.967 (*)     All other components within normal limits   B-TYPE NATRIURETIC PEPTIDE - Abnormal; Notable for the following:     BNP 1,039 (*)     All other components within normal limits   ALCOHOL,MEDICAL (ETHANOL) - Abnormal; Notable for the following:     Alcohol, Medical, Serum 288 (*)     All other components within normal limits   URINALYSIS - Abnormal; Notable for the following:     Color, UA Colorless (*)     Specific [place], UA 1.000 (*)     All other components within normal limits   DRUG SCREEN PANEL, URINE EMERGENCY - Abnormal; Notable for the following:     Creatinine, Random Ur 20.7 (*)     All other components within normal limits     EKG Readings: (Independently Interpreted)   Initial Reading: No STEMI. Previous EKG: Compared with most recent EKG Rhythm: Normal Sinus Rhythm. Heart Rate: 76. Ectopy: No Ectopy. Conduction: Normal. ST Segments: Non-Specific ST Segment Depression. T Waves Flipped: I, II, III, V3, V4, V5 and V6.       X-Rays:   Independently Interpreted Readings:   Chest X-Ray: No infiltrates.  No acute abnormalities.     Medical Decision Making:   History:   Old Medical Records: I decided to obtain old medical records.  Differential Diagnosis:   Alcohol and auscultation  GERD  Gastritis  Pancreatitis  ACS  Independently Interpreted Test(s):   I have ordered and independently interpreted X-rays - see prior notes.  I have ordered and independently interpreted EKG Reading(s) - see prior notes  Clinical Tests:   Lab Tests: Ordered and Reviewed  Radiological Study: Ordered and Reviewed  Medical Tests: Ordered and Reviewed  ED Management:  Patient alert and oriented but clinically intoxicated.  He is afebrile and in no acute distress. Initially triaged and denied chest pain and SOB. On exam, now reports non-exertional, non-radiating chest pain which began  yesterday. Denies injuries.  EKG with T-wave inversions consistent with prior EKG, no obvious acute ischemic changes.  Chest x-ray negative for pneumonia.  Labs notable for troponin of 0.961.  This is decreased from 1.3 approximately 2 months ago when he was admitted for  NSTEMI.  BNP is 1039. Chest x-ray does not look consistent with pulmonary edema and patient has no hypoxia and no increased work of breathing.  I have a low clinical suspicion of CHF exacerbation.  Alcohol level was 288.  Patient is in no acute distress and is attempting to leave the ER to smoke a cigarette.  We'll continue to monitor and sent a second troponin to evaluate for a positive trend.  Patient has continued has completely emergency room without event.  Second troponin is 0.967.  Low clinical suspicion for acute MI in this patient.  Symptoms are likely due to alcohol intoxication.  He is hemodynamically stable for discharge.  He is allowed to rest the emergency room for several hours and is now clinically sober with steady gait.  Patient counseled against excessive alcohol use and on the importance of medication compliance and close follow-up with his primary physician.  Patient expressed understanding and agreement with treatment plan.            Scribe Attestation:   Scribe #1: I performed the above scribed service and the documentation accurately describes the services I performed. I attest to the accuracy of the note.    Attending Attestation:           Physician Attestation for Scribe:  Physician Attestation Statement for Scribe #1: I, Johanna Dodge MD, reviewed documentation, as scribed by Basil Modi in my presence, and it is both accurate and complete.                    Clinical Impression:   The primary encounter diagnosis was Alcoholic intoxication without complication. A diagnosis of Chest pain was also pertinent to this visit.    Disposition:   Disposition: Discharged  Condition: Stable                        Johanna Dodge  MD  03/26/18 0029

## 2018-03-25 NOTE — ED TRIAGE NOTES
Patient arrived to ED via Ems. Ems reported patient was on the road, drinking for the past 12 hours, unable to stand up on his own. Patient denies SOB, chest pain, headache, abdominal pain. Patient states vomiting x2. Patient is noted walking from his room to rest room across the hallway. Patient is alert, awake, not oriented to time, situation.

## 2018-03-26 VITALS
BODY MASS INDEX: 25.07 KG/M2 | HEIGHT: 66 IN | SYSTOLIC BLOOD PRESSURE: 142 MMHG | OXYGEN SATURATION: 99 % | DIASTOLIC BLOOD PRESSURE: 98 MMHG | TEMPERATURE: 99 F | RESPIRATION RATE: 18 BRPM | WEIGHT: 156 LBS | HEART RATE: 84 BPM

## 2018-03-26 NOTE — DISCHARGE INSTRUCTIONS
Avoid excessive alcohol use.  Your medications as you have been prescribed.  It is very important that she follow-up with your cardiologist to monitor her symptoms and the status of her heart.  Testing done today does not appear to show that you are having a heart attack.  Make an appointment to see a primary care physician for a general checkup.  Visit one of the centers on the printed resources she if he would like assistance with cutting back on alcohol intake.  Return to the emergency room for worsening chest pain, breathing difficulty or any new or worsening symptoms.

## 2019-01-01 ENCOUNTER — HOSPITAL ENCOUNTER (INPATIENT)
Facility: HOSPITAL | Age: 54
LOS: 3 days | Discharge: HOME | DRG: 280 | End: 2019-10-30
Attending: EMERGENCY MEDICINE | Admitting: HOSPITALIST
Payer: MEDICAID

## 2019-01-01 ENCOUNTER — ANESTHESIA EVENT (OUTPATIENT)
Dept: MEDSURG UNIT | Facility: HOSPITAL | Age: 54
DRG: 227 | End: 2019-01-01
Payer: MEDICAID

## 2019-01-01 ENCOUNTER — TELEPHONE (OUTPATIENT)
Dept: ELECTROPHYSIOLOGY | Facility: CLINIC | Age: 54
End: 2019-01-01

## 2019-01-01 ENCOUNTER — CLINICAL SUPPORT (OUTPATIENT)
Dept: CARDIOLOGY | Facility: HOSPITAL | Age: 54
End: 2019-01-01
Payer: MEDICAID

## 2019-01-01 ENCOUNTER — PATIENT OUTREACH (OUTPATIENT)
Dept: ADMINISTRATIVE | Facility: CLINIC | Age: 54
End: 2019-01-01

## 2019-01-01 ENCOUNTER — HOSPITAL ENCOUNTER (INPATIENT)
Facility: HOSPITAL | Age: 54
LOS: 2 days | Discharge: HOME | DRG: 280 | End: 2019-07-27
Attending: EMERGENCY MEDICINE | Admitting: INTERNAL MEDICINE
Payer: MEDICAID

## 2019-01-01 ENCOUNTER — ANESTHESIA (OUTPATIENT)
Dept: MEDSURG UNIT | Facility: HOSPITAL | Age: 54
DRG: 227 | End: 2019-01-01
Payer: MEDICAID

## 2019-01-01 ENCOUNTER — HOSPITAL ENCOUNTER (INPATIENT)
Facility: HOSPITAL | Age: 54
LOS: 7 days | Discharge: HOME | DRG: 227 | End: 2019-08-06
Attending: EMERGENCY MEDICINE | Admitting: INTERNAL MEDICINE
Payer: MEDICAID

## 2019-01-01 VITALS
TEMPERATURE: 98 F | BODY MASS INDEX: 24.7 KG/M2 | SYSTOLIC BLOOD PRESSURE: 100 MMHG | WEIGHT: 153.69 LBS | HEIGHT: 66 IN | OXYGEN SATURATION: 98 % | RESPIRATION RATE: 18 BRPM | HEART RATE: 58 BPM | DIASTOLIC BLOOD PRESSURE: 65 MMHG

## 2019-01-01 VITALS
WEIGHT: 172.81 LBS | DIASTOLIC BLOOD PRESSURE: 68 MMHG | OXYGEN SATURATION: 98 % | HEIGHT: 66 IN | TEMPERATURE: 97 F | RESPIRATION RATE: 18 BRPM | BODY MASS INDEX: 27.77 KG/M2 | SYSTOLIC BLOOD PRESSURE: 101 MMHG | HEART RATE: 53 BPM

## 2019-01-01 VITALS
HEART RATE: 63 BPM | SYSTOLIC BLOOD PRESSURE: 118 MMHG | RESPIRATION RATE: 19 BRPM | HEIGHT: 66 IN | DIASTOLIC BLOOD PRESSURE: 72 MMHG | BODY MASS INDEX: 28.27 KG/M2 | TEMPERATURE: 98 F | WEIGHT: 175.94 LBS | OXYGEN SATURATION: 98 %

## 2019-01-01 DIAGNOSIS — R07.9 CHEST PAIN: ICD-10-CM

## 2019-01-01 DIAGNOSIS — I25.10 CAD (CORONARY ARTERY DISEASE): ICD-10-CM

## 2019-01-01 DIAGNOSIS — N18.30 ANEMIA IN STAGE 3 CHRONIC KIDNEY DISEASE: ICD-10-CM

## 2019-01-01 DIAGNOSIS — I47.20 VT (VENTRICULAR TACHYCARDIA): ICD-10-CM

## 2019-01-01 DIAGNOSIS — E78.2 MIXED HYPERLIPIDEMIA: Chronic | ICD-10-CM

## 2019-01-01 DIAGNOSIS — J18.9 PNEUMONIA OF BOTH LUNGS DUE TO INFECTIOUS ORGANISM, UNSPECIFIED PART OF LUNG: ICD-10-CM

## 2019-01-01 DIAGNOSIS — I10 ESSENTIAL HYPERTENSION: Chronic | ICD-10-CM

## 2019-01-01 DIAGNOSIS — Z95.810 AICD (AUTOMATIC CARDIOVERTER/DEFIBRILLATOR) PRESENT: ICD-10-CM

## 2019-01-01 DIAGNOSIS — N18.30 CKD (CHRONIC KIDNEY DISEASE) STAGE 3, GFR 30-59 ML/MIN: Chronic | ICD-10-CM

## 2019-01-01 DIAGNOSIS — D63.1 ANEMIA IN STAGE 3 CHRONIC KIDNEY DISEASE: ICD-10-CM

## 2019-01-01 DIAGNOSIS — I47.20 V TACH: ICD-10-CM

## 2019-01-01 DIAGNOSIS — I47.20 SUSTAINED VENTRICULAR TACHYCARDIA: ICD-10-CM

## 2019-01-01 DIAGNOSIS — I21.4 NSTEMI (NON-ST ELEVATED MYOCARDIAL INFARCTION): Primary | ICD-10-CM

## 2019-01-01 DIAGNOSIS — J18.9 PNEUMONIA OF RIGHT LOWER LOBE DUE TO INFECTIOUS ORGANISM: ICD-10-CM

## 2019-01-01 DIAGNOSIS — R07.9 ACUTE CHEST PAIN: ICD-10-CM

## 2019-01-01 DIAGNOSIS — R06.00 DYSPNEA: ICD-10-CM

## 2019-01-01 DIAGNOSIS — I50.9 ACUTE ON CHRONIC CONGESTIVE HEART FAILURE, UNSPECIFIED HEART FAILURE TYPE: ICD-10-CM

## 2019-01-01 DIAGNOSIS — R79.89 ELEVATED TROPONIN: ICD-10-CM

## 2019-01-01 DIAGNOSIS — I25.118 CORONARY ARTERY DISEASE OF NATIVE ARTERY OF NATIVE HEART WITH STABLE ANGINA PECTORIS: ICD-10-CM

## 2019-01-01 DIAGNOSIS — Z72.0 TOBACCO ABUSE: Chronic | ICD-10-CM

## 2019-01-01 DIAGNOSIS — I49.9 ARRHYTHMIA: ICD-10-CM

## 2019-01-01 DIAGNOSIS — I47.20 SUSTAINED VENTRICULAR TACHYCARDIA: Primary | ICD-10-CM

## 2019-01-01 DIAGNOSIS — Z86.73 HISTORY OF CVA (CEREBROVASCULAR ACCIDENT): Chronic | ICD-10-CM

## 2019-01-01 DIAGNOSIS — I25.5 ISCHEMIC CARDIOMYOPATHY: Chronic | ICD-10-CM

## 2019-01-01 DIAGNOSIS — Z95.810 AICD (AUTOMATIC CARDIOVERTER/DEFIBRILLATOR) PRESENT: Primary | ICD-10-CM

## 2019-01-01 LAB
ALBUMIN SERPL BCP-MCNC: 3.1 G/DL (ref 3.5–5.2)
ALBUMIN SERPL BCP-MCNC: 3.2 G/DL (ref 3.5–5.2)
ALBUMIN SERPL BCP-MCNC: 3.4 G/DL (ref 3.5–5.2)
ALBUMIN SERPL BCP-MCNC: 3.4 G/DL (ref 3.5–5.2)
ALBUMIN SERPL BCP-MCNC: 3.7 G/DL (ref 3.5–5.2)
ALP SERPL-CCNC: 57 U/L (ref 55–135)
ALP SERPL-CCNC: 62 U/L (ref 55–135)
ALP SERPL-CCNC: 67 U/L (ref 55–135)
ALP SERPL-CCNC: 70 U/L (ref 55–135)
ALP SERPL-CCNC: 93 U/L (ref 55–135)
ALT SERPL W/O P-5'-P-CCNC: 26 U/L (ref 10–44)
ALT SERPL W/O P-5'-P-CCNC: 34 U/L (ref 10–44)
ALT SERPL W/O P-5'-P-CCNC: 36 U/L (ref 10–44)
ALT SERPL W/O P-5'-P-CCNC: 43 U/L (ref 10–44)
ALT SERPL W/O P-5'-P-CCNC: 44 U/L (ref 10–44)
AMPHET+METHAMPHET UR QL: NEGATIVE
AMYLASE SERPL-CCNC: 30 U/L (ref 20–110)
ANION GAP SERPL CALC-SCNC: 10 MMOL/L (ref 8–16)
ANION GAP SERPL CALC-SCNC: 11 MMOL/L (ref 8–16)
ANION GAP SERPL CALC-SCNC: 11 MMOL/L (ref 8–16)
ANION GAP SERPL CALC-SCNC: 13 MMOL/L (ref 8–16)
ANION GAP SERPL CALC-SCNC: 6 MMOL/L (ref 8–16)
ANION GAP SERPL CALC-SCNC: 8 MMOL/L (ref 8–16)
ANION GAP SERPL CALC-SCNC: 9 MMOL/L (ref 8–16)
ANISOCYTOSIS BLD QL SMEAR: ABNORMAL
AORTIC ROOT ANNULUS: 2.86 CM
AORTIC ROOT ANNULUS: 2.99 CM
AORTIC VALVE CUSP SEPERATION: 1.84 CM
AORTIC VALVE CUSP SEPERATION: 2.08 CM
APTT BLDCRRT: 25.1 SEC (ref 21–32)
APTT BLDCRRT: 25.9 SEC (ref 21–32)
APTT BLDCRRT: 35.9 SEC (ref 21–32)
APTT BLDCRRT: 38.2 SEC (ref 21–32)
APTT BLDCRRT: 39.6 SEC (ref 21–32)
APTT BLDCRRT: 41 SEC (ref 21–32)
APTT BLDCRRT: 46 SEC (ref 21–32)
APTT BLDCRRT: 50.7 SEC (ref 21–32)
ASCENDING AORTA: 2.47 CM
ASCENDING AORTA: 2.89 CM
AST SERPL-CCNC: 23 U/L (ref 10–40)
AST SERPL-CCNC: 33 U/L (ref 10–40)
AST SERPL-CCNC: 35 U/L (ref 10–40)
AST SERPL-CCNC: 38 U/L (ref 10–40)
AST SERPL-CCNC: 43 U/L (ref 10–40)
AV INDEX (PROSTH): 0.66
AV INDEX (PROSTH): 0.75
AV MEAN GRADIENT: 2 MMHG
AV MEAN GRADIENT: 4 MMHG
AV PEAK GRADIENT: 4 MMHG
AV PEAK GRADIENT: 8 MMHG
AV VALVE AREA: 2.14 CM2
AV VALVE AREA: 3.14 CM2
AV VELOCITY RATIO: 0.85
AV VELOCITY RATIO: 0.88
BACTERIA BLD CULT: NORMAL
BARBITURATES UR QL SCN>200 NG/ML: NEGATIVE
BASOPHILS # BLD AUTO: 0.04 K/UL (ref 0–0.2)
BASOPHILS # BLD AUTO: 0.05 K/UL (ref 0–0.2)
BASOPHILS # BLD AUTO: 0.06 K/UL (ref 0–0.2)
BASOPHILS # BLD AUTO: 0.06 K/UL (ref 0–0.2)
BASOPHILS # BLD AUTO: 0.07 K/UL (ref 0–0.2)
BASOPHILS NFR BLD: 0.7 % (ref 0–1.9)
BASOPHILS NFR BLD: 0.8 % (ref 0–1.9)
BASOPHILS NFR BLD: 0.8 % (ref 0–1.9)
BASOPHILS NFR BLD: 0.9 % (ref 0–1.9)
BASOPHILS NFR BLD: 1 % (ref 0–1.9)
BASOPHILS NFR BLD: 1.1 % (ref 0–1.9)
BASOPHILS NFR BLD: 1.2 % (ref 0–1.9)
BASOPHILS NFR BLD: 1.4 % (ref 0–1.9)
BASOPHILS NFR BLD: 1.4 % (ref 0–1.9)
BASOPHILS NFR BLD: 1.5 % (ref 0–1.9)
BASOPHILS NFR BLD: 1.5 % (ref 0–1.9)
BASOPHILS NFR BLD: 1.6 % (ref 0–1.9)
BENZODIAZ UR QL SCN>200 NG/ML: NEGATIVE
BILIRUB SERPL-MCNC: 1 MG/DL (ref 0.1–1)
BILIRUB SERPL-MCNC: 1.1 MG/DL (ref 0.1–1)
BILIRUB SERPL-MCNC: 1.7 MG/DL (ref 0.1–1)
BILIRUB SERPL-MCNC: 1.8 MG/DL (ref 0.1–1)
BILIRUB SERPL-MCNC: 2.3 MG/DL (ref 0.1–1)
BNP SERPL-MCNC: 3044 PG/ML (ref 0–99)
BNP SERPL-MCNC: 3317 PG/ML (ref 0–99)
BSA FOR ECHO PROCEDURE: 1.91 M2
BSA FOR ECHO PROCEDURE: 1.98 M2
BUN SERPL-MCNC: 19 MG/DL (ref 6–20)
BUN SERPL-MCNC: 20 MG/DL (ref 6–20)
BUN SERPL-MCNC: 23 MG/DL (ref 6–20)
BUN SERPL-MCNC: 23 MG/DL (ref 6–20)
BUN SERPL-MCNC: 24 MG/DL (ref 6–20)
BUN SERPL-MCNC: 24 MG/DL (ref 6–20)
BUN SERPL-MCNC: 25 MG/DL (ref 6–20)
BUN SERPL-MCNC: 27 MG/DL (ref 6–20)
BZE UR QL SCN: NEGATIVE
CALCIUM SERPL-MCNC: 8.7 MG/DL (ref 8.7–10.5)
CALCIUM SERPL-MCNC: 8.8 MG/DL (ref 8.7–10.5)
CALCIUM SERPL-MCNC: 8.9 MG/DL (ref 8.7–10.5)
CALCIUM SERPL-MCNC: 9 MG/DL (ref 8.7–10.5)
CALCIUM SERPL-MCNC: 9.2 MG/DL (ref 8.7–10.5)
CALCIUM SERPL-MCNC: 9.2 MG/DL (ref 8.7–10.5)
CALCIUM SERPL-MCNC: 9.3 MG/DL (ref 8.7–10.5)
CALCIUM SERPL-MCNC: 9.3 MG/DL (ref 8.7–10.5)
CALCIUM SERPL-MCNC: 9.4 MG/DL (ref 8.7–10.5)
CALCIUM SERPL-MCNC: 9.6 MG/DL (ref 8.7–10.5)
CANNABINOIDS UR QL SCN: NEGATIVE
CHLORIDE SERPL-SCNC: 100 MMOL/L (ref 95–110)
CHLORIDE SERPL-SCNC: 100 MMOL/L (ref 95–110)
CHLORIDE SERPL-SCNC: 102 MMOL/L (ref 95–110)
CHLORIDE SERPL-SCNC: 102 MMOL/L (ref 95–110)
CHLORIDE SERPL-SCNC: 103 MMOL/L (ref 95–110)
CHLORIDE SERPL-SCNC: 105 MMOL/L (ref 95–110)
CHOLEST SERPL-MCNC: 116 MG/DL (ref 120–199)
CHOLEST SERPL-MCNC: 123 MG/DL (ref 120–199)
CHOLEST/HDLC SERPL: 10.5 {RATIO} (ref 2–5)
CHOLEST/HDLC SERPL: 5.6 {RATIO} (ref 2–5)
CK MB SERPL-MCNC: 3.2 NG/ML (ref 0.1–6.5)
CK MB SERPL-RTO: 1.4 % (ref 0–5)
CK SERPL-CCNC: 235 U/L (ref 20–200)
CK SERPL-CCNC: 235 U/L (ref 20–200)
CK SERPL-CCNC: 71 U/L (ref 20–200)
CO2 SERPL-SCNC: 22 MMOL/L (ref 23–29)
CO2 SERPL-SCNC: 22 MMOL/L (ref 23–29)
CO2 SERPL-SCNC: 23 MMOL/L (ref 23–29)
CO2 SERPL-SCNC: 25 MMOL/L (ref 23–29)
CO2 SERPL-SCNC: 26 MMOL/L (ref 23–29)
CO2 SERPL-SCNC: 26 MMOL/L (ref 23–29)
CO2 SERPL-SCNC: 29 MMOL/L (ref 23–29)
CREAT SERPL-MCNC: 1.5 MG/DL (ref 0.5–1.4)
CREAT SERPL-MCNC: 1.5 MG/DL (ref 0.5–1.4)
CREAT SERPL-MCNC: 1.6 MG/DL (ref 0.5–1.4)
CREAT SERPL-MCNC: 1.6 MG/DL (ref 0.5–1.4)
CREAT SERPL-MCNC: 1.7 MG/DL (ref 0.5–1.4)
CREAT SERPL-MCNC: 1.7 MG/DL (ref 0.5–1.4)
CREAT SERPL-MCNC: 1.8 MG/DL (ref 0.5–1.4)
CREAT SERPL-MCNC: 1.8 MG/DL (ref 0.5–1.4)
CREAT SERPL-MCNC: 1.9 MG/DL (ref 0.5–1.4)
CREAT SERPL-MCNC: 2.1 MG/DL (ref 0.5–1.4)
CREAT UR-MCNC: 350.3 MG/DL (ref 23–375)
CREAT UR-MCNC: 43 MG/DL (ref 23–375)
CREAT UR-MCNC: 9 MG/DL (ref 23–375)
CREAT UR-MCNC: 9 MG/DL (ref 23–375)
CV ECHO LV RWT: 0.29 CM
CV ECHO LV RWT: 0.63 CM
CV STRESS BASE HR: 60 BPM
DIASTOLIC BLOOD PRESSURE: 79 MMHG
DIFFERENTIAL METHOD: ABNORMAL
DOP CALC AO PEAK VEL: 0.98 M/S
DOP CALC AO PEAK VEL: 1.41 M/S
DOP CALC AO VTI: 17.14 CM
DOP CALC AO VTI: 20.44 CM
DOP CALC LVOT AREA: 3.2 CM2
DOP CALC LVOT AREA: 4.2 CM2
DOP CALC LVOT DIAMETER: 2.03 CM
DOP CALC LVOT DIAMETER: 2.31 CM
DOP CALC LVOT PEAK VEL: 0.83 M/S
DOP CALC LVOT PEAK VEL: 1.24 M/S
DOP CALC LVOT STROKE VOLUME: 36.65 CM3
DOP CALC LVOT STROKE VOLUME: 64.17 CM3
DOP CALCLVOT PEAK VEL VTI: 11.33 CM
DOP CALCLVOT PEAK VEL VTI: 15.32 CM
E WAVE DECELERATION TIME: 125.83 MSEC
E WAVE DECELERATION TIME: 221.47 MSEC
E/A RATIO: 2.35
E/A RATIO: 3.36
E/E' RATIO: 23.45 M/S
E/E' RATIO: 25.64 M/S
ECHO LV POSTERIOR WALL: 0.74 CM (ref 0.6–1.1)
ECHO LV POSTERIOR WALL: 1.53 CM (ref 0.6–1.1)
EOSINOPHIL # BLD AUTO: 0 K/UL (ref 0–0.5)
EOSINOPHIL # BLD AUTO: 0.1 K/UL (ref 0–0.5)
EOSINOPHIL NFR BLD: 0.3 % (ref 0–8)
EOSINOPHIL NFR BLD: 0.4 % (ref 0–8)
EOSINOPHIL NFR BLD: 0.6 % (ref 0–8)
EOSINOPHIL NFR BLD: 0.7 % (ref 0–8)
EOSINOPHIL NFR BLD: 0.9 % (ref 0–8)
EOSINOPHIL NFR BLD: 1 % (ref 0–8)
EOSINOPHIL NFR BLD: 1.4 % (ref 0–8)
EOSINOPHIL NFR BLD: 1.4 % (ref 0–8)
EOSINOPHIL NFR BLD: 1.5 % (ref 0–8)
EOSINOPHIL NFR BLD: 1.6 % (ref 0–8)
EOSINOPHIL NFR BLD: 1.9 % (ref 0–8)
EOSINOPHIL NFR BLD: 2 % (ref 0–8)
ERYTHROCYTE [DISTWIDTH] IN BLOOD BY AUTOMATED COUNT: 15.7 % (ref 11.5–14.5)
ERYTHROCYTE [DISTWIDTH] IN BLOOD BY AUTOMATED COUNT: 15.7 % (ref 11.5–14.5)
ERYTHROCYTE [DISTWIDTH] IN BLOOD BY AUTOMATED COUNT: 15.8 % (ref 11.5–14.5)
ERYTHROCYTE [DISTWIDTH] IN BLOOD BY AUTOMATED COUNT: 15.9 % (ref 11.5–14.5)
ERYTHROCYTE [DISTWIDTH] IN BLOOD BY AUTOMATED COUNT: 16 % (ref 11.5–14.5)
ERYTHROCYTE [DISTWIDTH] IN BLOOD BY AUTOMATED COUNT: 19.3 % (ref 11.5–14.5)
ERYTHROCYTE [DISTWIDTH] IN BLOOD BY AUTOMATED COUNT: 19.3 % (ref 11.5–14.5)
ERYTHROCYTE [DISTWIDTH] IN BLOOD BY AUTOMATED COUNT: 19.4 % (ref 11.5–14.5)
ERYTHROCYTE [DISTWIDTH] IN BLOOD BY AUTOMATED COUNT: 19.5 % (ref 11.5–14.5)
EST. GFR  (AFRICAN AMERICAN): 39.5 ML/MIN/1.73 M^2
EST. GFR  (AFRICAN AMERICAN): 44.6 ML/MIN/1.73 M^2
EST. GFR  (AFRICAN AMERICAN): 47.6 ML/MIN/1.73 M^2
EST. GFR  (AFRICAN AMERICAN): 48 ML/MIN/1.73 M^2
EST. GFR  (AFRICAN AMERICAN): 51 ML/MIN/1.73 M^2
EST. GFR  (AFRICAN AMERICAN): 51 ML/MIN/1.73 M^2
EST. GFR  (AFRICAN AMERICAN): 55 ML/MIN/1.73 M^2
EST. GFR  (AFRICAN AMERICAN): 55 ML/MIN/1.73 M^2
EST. GFR  (AFRICAN AMERICAN): 59 ML/MIN/1.73 M^2
EST. GFR  (AFRICAN AMERICAN): 59 ML/MIN/1.73 M^2
EST. GFR  (NON AFRICAN AMERICAN): 34.2 ML/MIN/1.73 M^2
EST. GFR  (NON AFRICAN AMERICAN): 38.6 ML/MIN/1.73 M^2
EST. GFR  (NON AFRICAN AMERICAN): 41 ML/MIN/1.73 M^2
EST. GFR  (NON AFRICAN AMERICAN): 41.2 ML/MIN/1.73 M^2
EST. GFR  (NON AFRICAN AMERICAN): 44 ML/MIN/1.73 M^2
EST. GFR  (NON AFRICAN AMERICAN): 44 ML/MIN/1.73 M^2
EST. GFR  (NON AFRICAN AMERICAN): 47 ML/MIN/1.73 M^2
EST. GFR  (NON AFRICAN AMERICAN): 47 ML/MIN/1.73 M^2
EST. GFR  (NON AFRICAN AMERICAN): 51 ML/MIN/1.73 M^2
EST. GFR  (NON AFRICAN AMERICAN): 51 ML/MIN/1.73 M^2
ESTIMATED AVG GLUCOSE: 128 MG/DL (ref 68–131)
ETHANOL SERPL-MCNC: <10 MG/DL
ETHANOL UR-MCNC: <10 MG/DL
ETHANOL UR-MCNC: <10 MG/DL
FERRITIN SERPL-MCNC: 34 NG/ML (ref 20–300)
FRACTIONAL SHORTENING: 15 % (ref 28–44)
FRACTIONAL SHORTENING: 19 % (ref 28–44)
GLUCOSE SERPL-MCNC: 103 MG/DL (ref 70–110)
GLUCOSE SERPL-MCNC: 106 MG/DL (ref 70–110)
GLUCOSE SERPL-MCNC: 113 MG/DL (ref 70–110)
GLUCOSE SERPL-MCNC: 86 MG/DL (ref 70–110)
GLUCOSE SERPL-MCNC: 89 MG/DL (ref 70–110)
GLUCOSE SERPL-MCNC: 89 MG/DL (ref 70–110)
GLUCOSE SERPL-MCNC: 90 MG/DL (ref 70–110)
GLUCOSE SERPL-MCNC: 94 MG/DL (ref 70–110)
GLUCOSE SERPL-MCNC: 96 MG/DL (ref 70–110)
GLUCOSE SERPL-MCNC: 98 MG/DL (ref 70–110)
HAV IGM SERPL QL IA: NEGATIVE
HBA1C MFR BLD HPLC: 6.1 % (ref 4–5.6)
HBV CORE IGM SERPL QL IA: NEGATIVE
HBV SURFACE AG SERPL QL IA: NEGATIVE
HCT VFR BLD AUTO: 33.7 % (ref 40–54)
HCT VFR BLD AUTO: 34.3 % (ref 40–54)
HCT VFR BLD AUTO: 35 % (ref 40–54)
HCT VFR BLD AUTO: 35 % (ref 40–54)
HCT VFR BLD AUTO: 35.5 % (ref 40–54)
HCT VFR BLD AUTO: 35.5 % (ref 40–54)
HCT VFR BLD AUTO: 35.8 % (ref 40–54)
HCT VFR BLD AUTO: 36.5 % (ref 40–54)
HCT VFR BLD AUTO: 36.7 % (ref 40–54)
HCT VFR BLD AUTO: 37.1 % (ref 40–54)
HCT VFR BLD AUTO: 37.2 % (ref 40–54)
HCT VFR BLD AUTO: 38 % (ref 40–54)
HCT VFR BLD AUTO: 38.4 % (ref 40–54)
HCT VFR BLD AUTO: 39.8 % (ref 40–54)
HCV AB SERPL QL IA: NEGATIVE
HDLC SERPL-MCNC: 11 MG/DL (ref 40–75)
HDLC SERPL-MCNC: 22 MG/DL (ref 40–75)
HDLC SERPL: 17.9 % (ref 20–50)
HDLC SERPL: 9.5 % (ref 20–50)
HGB BLD-MCNC: 10.3 G/DL (ref 14–18)
HGB BLD-MCNC: 10.5 G/DL (ref 14–18)
HGB BLD-MCNC: 10.9 G/DL (ref 14–18)
HGB BLD-MCNC: 11.2 G/DL (ref 14–18)
HGB BLD-MCNC: 11.3 G/DL (ref 14–18)
HGB BLD-MCNC: 11.4 G/DL (ref 14–18)
HGB BLD-MCNC: 11.8 G/DL (ref 14–18)
HGB BLD-MCNC: 12.1 G/DL (ref 14–18)
HGB BLD-MCNC: 12.1 G/DL (ref 14–18)
HGB BLD-MCNC: 12.5 G/DL (ref 14–18)
HGB BLD-MCNC: 12.9 G/DL (ref 14–18)
HYPOCHROMIA BLD QL SMEAR: ABNORMAL
IMM GRANULOCYTES # BLD AUTO: 0.01 K/UL (ref 0–0.04)
IMM GRANULOCYTES # BLD AUTO: 0.02 K/UL (ref 0–0.04)
IMM GRANULOCYTES # BLD AUTO: 0.03 K/UL (ref 0–0.04)
IMM GRANULOCYTES NFR BLD AUTO: 0.2 % (ref 0–0.5)
IMM GRANULOCYTES NFR BLD AUTO: 0.3 % (ref 0–0.5)
IMM GRANULOCYTES NFR BLD AUTO: 0.4 % (ref 0–0.5)
IMM GRANULOCYTES NFR BLD AUTO: 0.5 % (ref 0–0.5)
IMM GRANULOCYTES NFR BLD AUTO: 0.6 % (ref 0–0.5)
IMM GRANULOCYTES NFR BLD AUTO: 0.7 % (ref 0–0.5)
INR PPP: 1.1 (ref 0.8–1.2)
INR PPP: 1.1 (ref 0.8–1.2)
INTERVENTRICULAR SEPTUM: 1.14 CM (ref 0.6–1.1)
INTERVENTRICULAR SEPTUM: 1.52 CM (ref 0.6–1.1)
IRON SERPL-MCNC: 25 UG/DL (ref 45–160)
IVRT: 0.09 MSEC
IVRT: 0.11 MSEC
LA MAJOR: 5.36 CM
LA MAJOR: 5.76 CM
LA MINOR: 5.92 CM
LA WIDTH: 3.37 CM
LACTATE SERPL-SCNC: 2.1 MMOL/L (ref 0.5–2.2)
LDLC SERPL CALC-MCNC: 87.8 MG/DL (ref 63–159)
LDLC SERPL CALC-MCNC: 90.8 MG/DL (ref 63–159)
LEFT ATRIUM SIZE: 3.06 CM
LEFT ATRIUM SIZE: 3.77 CM
LEFT ATRIUM VOLUME INDEX: 32.8 ML/M2
LEFT ATRIUM VOLUME: 63.06 CM3
LEFT INTERNAL DIMENSION IN SYSTOLE: 4.09 CM (ref 2.1–4)
LEFT INTERNAL DIMENSION IN SYSTOLE: 4.11 CM (ref 2.1–4)
LEFT VENTRICLE DIASTOLIC VOLUME INDEX: 57.07 ML/M2
LEFT VENTRICLE DIASTOLIC VOLUME INDEX: 66.28 ML/M2
LEFT VENTRICLE DIASTOLIC VOLUME: 109.57 ML
LEFT VENTRICLE DIASTOLIC VOLUME: 122.2 ML
LEFT VENTRICLE MASS INDEX: 164 G/M2
LEFT VENTRICLE MASS INDEX: 93 G/M2
LEFT VENTRICLE SYSTOLIC VOLUME INDEX: 38.5 ML/M2
LEFT VENTRICLE SYSTOLIC VOLUME INDEX: 40.6 ML/M2
LEFT VENTRICLE SYSTOLIC VOLUME: 73.88 ML
LEFT VENTRICLE SYSTOLIC VOLUME: 74.85 ML
LEFT VENTRICULAR INTERNAL DIMENSION IN DIASTOLE: 4.84 CM (ref 3.5–6)
LEFT VENTRICULAR INTERNAL DIMENSION IN DIASTOLE: 5.07 CM (ref 3.5–6)
LEFT VENTRICULAR MASS: 171.47 G
LEFT VENTRICULAR MASS: 314.92 G
LIPASE SERPL-CCNC: 14 U/L (ref 4–60)
LIPASE SERPL-CCNC: 21 U/L (ref 4–60)
LIPASE SERPL-CCNC: 32 U/L (ref 4–60)
LV LATERAL E/E' RATIO: 18.43 M/S
LV LATERAL E/E' RATIO: 20.14 M/S
LV SEPTAL E/E' RATIO: 32.25 M/S
LV SEPTAL E/E' RATIO: 35.25 M/S
LYMPHOCYTES # BLD AUTO: 1.4 K/UL (ref 1–4.8)
LYMPHOCYTES # BLD AUTO: 1.5 K/UL (ref 1–4.8)
LYMPHOCYTES # BLD AUTO: 1.6 K/UL (ref 1–4.8)
LYMPHOCYTES # BLD AUTO: 1.7 K/UL (ref 1–4.8)
LYMPHOCYTES # BLD AUTO: 1.8 K/UL (ref 1–4.8)
LYMPHOCYTES # BLD AUTO: 1.9 K/UL (ref 1–4.8)
LYMPHOCYTES # BLD AUTO: 1.9 K/UL (ref 1–4.8)
LYMPHOCYTES NFR BLD: 21 % (ref 18–48)
LYMPHOCYTES NFR BLD: 24.6 % (ref 18–48)
LYMPHOCYTES NFR BLD: 25.4 % (ref 18–48)
LYMPHOCYTES NFR BLD: 28 % (ref 18–48)
LYMPHOCYTES NFR BLD: 29.4 % (ref 18–48)
LYMPHOCYTES NFR BLD: 29.4 % (ref 18–48)
LYMPHOCYTES NFR BLD: 30.1 % (ref 18–48)
LYMPHOCYTES NFR BLD: 33.5 % (ref 18–48)
LYMPHOCYTES NFR BLD: 33.8 % (ref 18–48)
LYMPHOCYTES NFR BLD: 34.9 % (ref 18–48)
LYMPHOCYTES NFR BLD: 37.7 % (ref 18–48)
LYMPHOCYTES NFR BLD: 38.1 % (ref 18–48)
LYMPHOCYTES NFR BLD: 38.2 % (ref 18–48)
LYMPHOCYTES NFR BLD: 47.4 % (ref 18–48)
MAGNESIUM SERPL-MCNC: 1.9 MG/DL (ref 1.6–2.6)
MAGNESIUM SERPL-MCNC: 2 MG/DL (ref 1.6–2.6)
MAGNESIUM SERPL-MCNC: 2.1 MG/DL (ref 1.6–2.6)
MCH RBC QN AUTO: 24.4 PG (ref 27–31)
MCH RBC QN AUTO: 24.5 PG (ref 27–31)
MCH RBC QN AUTO: 24.6 PG (ref 27–31)
MCH RBC QN AUTO: 25.1 PG (ref 27–31)
MCH RBC QN AUTO: 27.6 PG (ref 27–31)
MCH RBC QN AUTO: 27.7 PG (ref 27–31)
MCH RBC QN AUTO: 28 PG (ref 27–31)
MCH RBC QN AUTO: 28.1 PG (ref 27–31)
MCH RBC QN AUTO: 28.1 PG (ref 27–31)
MCH RBC QN AUTO: 28.2 PG (ref 27–31)
MCH RBC QN AUTO: 28.5 PG (ref 27–31)
MCH RBC QN AUTO: 28.6 PG (ref 27–31)
MCH RBC QN AUTO: 28.8 PG (ref 27–31)
MCH RBC QN AUTO: 28.8 PG (ref 27–31)
MCHC RBC AUTO-ENTMCNC: 30.6 G/DL (ref 32–36)
MCHC RBC AUTO-ENTMCNC: 30.7 G/DL (ref 32–36)
MCHC RBC AUTO-ENTMCNC: 31.1 G/DL (ref 32–36)
MCHC RBC AUTO-ENTMCNC: 31.6 G/DL (ref 32–36)
MCHC RBC AUTO-ENTMCNC: 31.8 G/DL (ref 32–36)
MCHC RBC AUTO-ENTMCNC: 32.2 G/DL (ref 32–36)
MCHC RBC AUTO-ENTMCNC: 32.3 G/DL (ref 32–36)
MCHC RBC AUTO-ENTMCNC: 32.4 G/DL (ref 32–36)
MCHC RBC AUTO-ENTMCNC: 32.6 G/DL (ref 32–36)
MCHC RBC AUTO-ENTMCNC: 32.6 G/DL (ref 32–36)
MCV RBC AUTO: 80 FL (ref 82–98)
MCV RBC AUTO: 81 FL (ref 82–98)
MCV RBC AUTO: 87 FL (ref 82–98)
MCV RBC AUTO: 88 FL (ref 82–98)
MCV RBC AUTO: 89 FL (ref 82–98)
MCV RBC AUTO: 89 FL (ref 82–98)
MCV RBC AUTO: 91 FL (ref 82–98)
METHADONE UR QL SCN>300 NG/ML: NEGATIVE
MONOCYTES # BLD AUTO: 0.6 K/UL (ref 0.3–1)
MONOCYTES # BLD AUTO: 0.8 K/UL (ref 0.3–1)
MONOCYTES # BLD AUTO: 0.8 K/UL (ref 0.3–1)
MONOCYTES # BLD AUTO: 0.9 K/UL (ref 0.3–1)
MONOCYTES # BLD AUTO: 1 K/UL (ref 0.3–1)
MONOCYTES # BLD AUTO: 1.1 K/UL (ref 0.3–1)
MONOCYTES # BLD AUTO: 1.1 K/UL (ref 0.3–1)
MONOCYTES # BLD AUTO: 1.2 K/UL (ref 0.3–1)
MONOCYTES NFR BLD: 14.7 % (ref 4–15)
MONOCYTES NFR BLD: 16.7 % (ref 4–15)
MONOCYTES NFR BLD: 17.2 % (ref 4–15)
MONOCYTES NFR BLD: 17.4 % (ref 4–15)
MONOCYTES NFR BLD: 18.3 % (ref 4–15)
MONOCYTES NFR BLD: 18.3 % (ref 4–15)
MONOCYTES NFR BLD: 18.5 % (ref 4–15)
MONOCYTES NFR BLD: 18.8 % (ref 4–15)
MONOCYTES NFR BLD: 18.8 % (ref 4–15)
MONOCYTES NFR BLD: 19.2 % (ref 4–15)
MONOCYTES NFR BLD: 19.7 % (ref 4–15)
MONOCYTES NFR BLD: 20.5 % (ref 4–15)
MV PEAK A VEL: 0.42 M/S
MV PEAK A VEL: 0.55 M/S
MV PEAK E VEL: 1.29 M/S
MV PEAK E VEL: 1.41 M/S
NEUTROPHILS # BLD AUTO: 1.4 K/UL (ref 1.8–7.7)
NEUTROPHILS # BLD AUTO: 1.7 K/UL (ref 1.8–7.7)
NEUTROPHILS # BLD AUTO: 1.8 K/UL (ref 1.8–7.7)
NEUTROPHILS # BLD AUTO: 1.9 K/UL (ref 1.8–7.7)
NEUTROPHILS # BLD AUTO: 2 K/UL (ref 1.8–7.7)
NEUTROPHILS # BLD AUTO: 2 K/UL (ref 1.8–7.7)
NEUTROPHILS # BLD AUTO: 2.5 K/UL (ref 1.8–7.7)
NEUTROPHILS # BLD AUTO: 2.6 K/UL (ref 1.8–7.7)
NEUTROPHILS # BLD AUTO: 3 K/UL (ref 1.8–7.7)
NEUTROPHILS # BLD AUTO: 3.1 K/UL (ref 1.8–7.7)
NEUTROPHILS # BLD AUTO: 3.3 K/UL (ref 1.8–7.7)
NEUTROPHILS # BLD AUTO: 3.9 K/UL (ref 1.8–7.7)
NEUTROPHILS NFR BLD: 34.6 % (ref 38–73)
NEUTROPHILS NFR BLD: 39.6 % (ref 38–73)
NEUTROPHILS NFR BLD: 39.7 % (ref 38–73)
NEUTROPHILS NFR BLD: 40.5 % (ref 38–73)
NEUTROPHILS NFR BLD: 43.8 % (ref 38–73)
NEUTROPHILS NFR BLD: 44 % (ref 38–73)
NEUTROPHILS NFR BLD: 46.7 % (ref 38–73)
NEUTROPHILS NFR BLD: 48 % (ref 38–73)
NEUTROPHILS NFR BLD: 48 % (ref 38–73)
NEUTROPHILS NFR BLD: 49.7 % (ref 38–73)
NEUTROPHILS NFR BLD: 51 % (ref 38–73)
NEUTROPHILS NFR BLD: 54.4 % (ref 38–73)
NEUTROPHILS NFR BLD: 56.3 % (ref 38–73)
NEUTROPHILS NFR BLD: 60.3 % (ref 38–73)
NONHDLC SERPL-MCNC: 101 MG/DL
NONHDLC SERPL-MCNC: 105 MG/DL
NRBC BLD-RTO: 0 /100 WBC
NUC STRESS DIASTOLIC VOLUME INDEX: 203
NUC STRESS EJECTION FRACTION: 24 %
NUC STRESS SYSTOLIC VOLUME INDEX: 154
OHS CV CPX 85 PERCENT MAX PREDICTED HEART RATE MALE: 139
OHS CV CPX MAX PREDICTED HEART RATE: 164
OHS CV CPX PATIENT IS FEMALE: 0
OHS CV CPX PATIENT IS MALE: 1
OHS CV CPX PEAK DIASTOLIC BLOOD PRESSURE: 76 MMHG
OHS CV CPX PEAK HEAR RATE: 64 BPM
OHS CV CPX PEAK RATE PRESSURE PRODUCT: 6848
OHS CV CPX PEAK SYSTOLIC BLOOD PRESSURE: 107 MMHG
OHS CV CPX PERCENT MAX PREDICTED HEART RATE ACHIEVED: 39
OHS CV CPX RATE PRESSURE PRODUCT PRESENTING: 6180
OPIATES UR QL SCN: NEGATIVE
OPIATES UR QL SCN: NORMAL
PCP UR QL SCN>25 NG/ML: NEGATIVE
PHOSPHATE SERPL-MCNC: 2.9 MG/DL (ref 2.7–4.5)
PHOSPHATE SERPL-MCNC: 3.3 MG/DL (ref 2.7–4.5)
PISA TR MAX VEL: 2.6 M/S
PISA TR MAX VEL: 2.92 M/S
PLATELET # BLD AUTO: 236 K/UL (ref 150–350)
PLATELET # BLD AUTO: 260 K/UL (ref 150–350)
PLATELET # BLD AUTO: 263 K/UL (ref 150–350)
PLATELET # BLD AUTO: 270 K/UL (ref 150–350)
PLATELET # BLD AUTO: 272 K/UL (ref 150–350)
PLATELET # BLD AUTO: 278 K/UL (ref 150–350)
PLATELET # BLD AUTO: 291 K/UL (ref 150–350)
PLATELET # BLD AUTO: 291 K/UL (ref 150–350)
PLATELET # BLD AUTO: 299 K/UL (ref 150–350)
PLATELET # BLD AUTO: 299 K/UL (ref 150–350)
PLATELET # BLD AUTO: 307 K/UL (ref 150–350)
PLATELET # BLD AUTO: 313 K/UL (ref 150–350)
PLATELET # BLD AUTO: 348 K/UL (ref 150–350)
PLATELET # BLD AUTO: 349 K/UL (ref 150–350)
PLATELET BLD QL SMEAR: ABNORMAL
PMV BLD AUTO: 11 FL (ref 9.2–12.9)
PMV BLD AUTO: 11.2 FL (ref 9.2–12.9)
PMV BLD AUTO: 11.2 FL (ref 9.2–12.9)
PMV BLD AUTO: 11.9 FL (ref 9.2–12.9)
PMV BLD AUTO: 11.9 FL (ref 9.2–12.9)
PMV BLD AUTO: 12 FL (ref 9.2–12.9)
PMV BLD AUTO: 12.2 FL (ref 9.2–12.9)
PMV BLD AUTO: 12.3 FL (ref 9.2–12.9)
PMV BLD AUTO: 12.3 FL (ref 9.2–12.9)
PMV BLD AUTO: 12.4 FL (ref 9.2–12.9)
PMV BLD AUTO: 12.4 FL (ref 9.2–12.9)
PMV BLD AUTO: 12.6 FL (ref 9.2–12.9)
POIKILOCYTOSIS BLD QL SMEAR: SLIGHT
POTASSIUM SERPL-SCNC: 3.4 MMOL/L (ref 3.5–5.1)
POTASSIUM SERPL-SCNC: 3.6 MMOL/L (ref 3.5–5.1)
POTASSIUM SERPL-SCNC: 3.7 MMOL/L (ref 3.5–5.1)
POTASSIUM SERPL-SCNC: 3.8 MMOL/L (ref 3.5–5.1)
POTASSIUM SERPL-SCNC: 3.8 MMOL/L (ref 3.5–5.1)
POTASSIUM SERPL-SCNC: 4 MMOL/L (ref 3.5–5.1)
POTASSIUM SERPL-SCNC: 4.3 MMOL/L (ref 3.5–5.1)
POTASSIUM SERPL-SCNC: 4.3 MMOL/L (ref 3.5–5.1)
POTASSIUM SERPL-SCNC: 4.5 MMOL/L (ref 3.5–5.1)
POTASSIUM SERPL-SCNC: 4.8 MMOL/L (ref 3.5–5.1)
PROCALCITONIN SERPL IA-MCNC: 0.09 NG/ML
PROT SERPL-MCNC: 5.9 G/DL (ref 6–8.4)
PROT SERPL-MCNC: 5.9 G/DL (ref 6–8.4)
PROT SERPL-MCNC: 6.2 G/DL (ref 6–8.4)
PROT SERPL-MCNC: 6.4 G/DL (ref 6–8.4)
PROT SERPL-MCNC: 6.7 G/DL (ref 6–8.4)
PROTHROMBIN TIME: 11.6 SEC (ref 9–12.5)
PROTHROMBIN TIME: 12.1 SEC (ref 9–12.5)
PULM VEIN S/D RATIO: 0.71
PV PEAK D VEL: 0.35 M/S
PV PEAK S VEL: 0.25 M/S
PV PEAK VELOCITY: 0.7 CM/S
PV PEAK VELOCITY: 1.07 CM/S
RA MAJOR: 5.22 CM
RA MAJOR: 5.26 CM
RA PRESSURE: 15 MMHG
RA PRESSURE: 8 MMHG
RA WIDTH: 3.68 CM
RA WIDTH: 4.82 CM
RBC # BLD AUTO: 4.01 M/UL (ref 4.6–6.2)
RBC # BLD AUTO: 4.02 M/UL (ref 4.6–6.2)
RBC # BLD AUTO: 4.02 M/UL (ref 4.6–6.2)
RBC # BLD AUTO: 4.09 M/UL (ref 4.6–6.2)
RBC # BLD AUTO: 4.11 M/UL (ref 4.6–6.2)
RBC # BLD AUTO: 4.14 M/UL (ref 4.6–6.2)
RBC # BLD AUTO: 4.2 M/UL (ref 4.6–6.2)
RBC # BLD AUTO: 4.22 M/UL (ref 4.6–6.2)
RBC # BLD AUTO: 4.28 M/UL (ref 4.6–6.2)
RBC # BLD AUTO: 4.32 M/UL (ref 4.6–6.2)
RBC # BLD AUTO: 4.34 M/UL (ref 4.6–6.2)
RBC # BLD AUTO: 4.34 M/UL (ref 4.6–6.2)
RBC # BLD AUTO: 4.51 M/UL (ref 4.6–6.2)
RBC # BLD AUTO: 4.56 M/UL (ref 4.6–6.2)
RIGHT VENTRICULAR END-DIASTOLIC DIMENSION: 4.07 CM
RIGHT VENTRICULAR END-DIASTOLIC DIMENSION: 4.23 CM
RV TISSUE DOPPLER FREE WALL SYSTOLIC VELOCITY 1 (APICAL 4 CHAMBER VIEW): 5.52 CM/S
RV TISSUE DOPPLER FREE WALL SYSTOLIC VELOCITY 1 (APICAL 4 CHAMBER VIEW): 6.61 CM/S
SATURATED IRON: 5 % (ref 20–50)
SINUS: 2.98 CM
SINUS: 3.1 CM
SODIUM SERPL-SCNC: 134 MMOL/L (ref 136–145)
SODIUM SERPL-SCNC: 135 MMOL/L (ref 136–145)
SODIUM SERPL-SCNC: 135 MMOL/L (ref 136–145)
SODIUM SERPL-SCNC: 137 MMOL/L (ref 136–145)
SODIUM SERPL-SCNC: 137 MMOL/L (ref 136–145)
SODIUM SERPL-SCNC: 138 MMOL/L (ref 136–145)
SODIUM SERPL-SCNC: 139 MMOL/L (ref 136–145)
SODIUM SERPL-SCNC: 139 MMOL/L (ref 136–145)
STJ: 2.7 CM
STJ: 2.79 CM
STRESS ECHO TARGET HR: 139 BPM
SYSTOLIC BLOOD PRESSURE: 103 MMHG
TDI LATERAL: 0.07 M/S
TDI LATERAL: 0.07 M/S
TDI SEPTAL: 0.04 M/S
TDI SEPTAL: 0.04 M/S
TDI: 0.06 M/S
TDI: 0.06 M/S
TOTAL IRON BINDING CAPACITY: 514 UG/DL (ref 250–450)
TOXICOLOGY INFORMATION: ABNORMAL
TOXICOLOGY INFORMATION: ABNORMAL
TOXICOLOGY INFORMATION: NORMAL
TOXICOLOGY INFORMATION: NORMAL
TR MAX PG: 27 MMHG
TR MAX PG: 34 MMHG
TRANSFERRIN SERPL-MCNC: 347 MG/DL (ref 200–375)
TRICUSPID ANNULAR PLANE SYSTOLIC EXCURSION: 0.95 CM
TRICUSPID ANNULAR PLANE SYSTOLIC EXCURSION: 1.28 CM
TRIGL SERPL-MCNC: 66 MG/DL (ref 30–150)
TRIGL SERPL-MCNC: 71 MG/DL (ref 30–150)
TROPONIN I SERPL DL<=0.01 NG/ML-MCNC: 1.17 NG/ML (ref 0–0.03)
TROPONIN I SERPL DL<=0.01 NG/ML-MCNC: 1.22 NG/ML (ref 0–0.03)
TROPONIN I SERPL DL<=0.01 NG/ML-MCNC: 1.58 NG/ML (ref 0–0.03)
TROPONIN I SERPL DL<=0.01 NG/ML-MCNC: 1.68 NG/ML (ref 0–0.03)
TROPONIN I SERPL DL<=0.01 NG/ML-MCNC: 1.74 NG/ML (ref 0–0.03)
TROPONIN I SERPL DL<=0.01 NG/ML-MCNC: 1.78 NG/ML (ref 0–0.03)
TROPONIN I SERPL DL<=0.01 NG/ML-MCNC: 1.81 NG/ML (ref 0–0.03)
TROPONIN I SERPL DL<=0.01 NG/ML-MCNC: 1.82 NG/ML (ref 0–0.03)
TROPONIN I SERPL DL<=0.01 NG/ML-MCNC: 1.82 NG/ML (ref 0–0.03)
TROPONIN I SERPL DL<=0.01 NG/ML-MCNC: 1.89 NG/ML (ref 0–0.03)
TROPONIN I SERPL DL<=0.01 NG/ML-MCNC: 1.9 NG/ML (ref 0–0.03)
TROPONIN I SERPL DL<=0.01 NG/ML-MCNC: 1.96 NG/ML (ref 0–0.03)
TV REST PULMONARY ARTERY PRESSURE: 35 MMHG
TV REST PULMONARY ARTERY PRESSURE: 49 MMHG
WBC # BLD AUTO: 4.09 K/UL (ref 3.9–12.7)
WBC # BLD AUTO: 4.32 K/UL (ref 3.9–12.7)
WBC # BLD AUTO: 4.41 K/UL (ref 3.9–12.7)
WBC # BLD AUTO: 4.52 K/UL (ref 3.9–12.7)
WBC # BLD AUTO: 4.64 K/UL (ref 3.9–12.7)
WBC # BLD AUTO: 4.97 K/UL (ref 3.9–12.7)
WBC # BLD AUTO: 5.1 K/UL (ref 3.9–12.7)
WBC # BLD AUTO: 5.1 K/UL (ref 3.9–12.7)
WBC # BLD AUTO: 5.15 K/UL (ref 3.9–12.7)
WBC # BLD AUTO: 5.23 K/UL (ref 3.9–12.7)
WBC # BLD AUTO: 5.7 K/UL (ref 3.9–12.7)
WBC # BLD AUTO: 5.83 K/UL (ref 3.9–12.7)
WBC # BLD AUTO: 5.9 K/UL (ref 3.9–12.7)
WBC # BLD AUTO: 6.53 K/UL (ref 3.9–12.7)

## 2019-01-01 PROCEDURE — 85025 COMPLETE CBC W/AUTO DIFF WBC: CPT

## 2019-01-01 PROCEDURE — 80053 COMPREHEN METABOLIC PANEL: CPT

## 2019-01-01 PROCEDURE — 25000003 PHARM REV CODE 250: Performed by: INTERNAL MEDICINE

## 2019-01-01 PROCEDURE — 93010 EKG 12-LEAD: ICD-10-PCS | Mod: ,,, | Performed by: INTERNAL MEDICINE

## 2019-01-01 PROCEDURE — 25000003 PHARM REV CODE 250: Performed by: EMERGENCY MEDICINE

## 2019-01-01 PROCEDURE — 25000003 PHARM REV CODE 250: Performed by: HOSPITALIST

## 2019-01-01 PROCEDURE — 85730 THROMBOPLASTIN TIME PARTIAL: CPT | Mod: 91

## 2019-01-01 PROCEDURE — 93459 L HRT ART/GRFT ANGIO: CPT | Mod: 26,,, | Performed by: INTERNAL MEDICINE

## 2019-01-01 PROCEDURE — 93295 CARDIAC DEVICE CHECK - REMOTE: ICD-10-PCS | Mod: ,,, | Performed by: INTERNAL MEDICINE

## 2019-01-01 PROCEDURE — 63600175 PHARM REV CODE 636 W HCPCS: Performed by: HOSPITALIST

## 2019-01-01 PROCEDURE — C1887 CATHETER, GUIDING: HCPCS | Performed by: INTERNAL MEDICINE

## 2019-01-01 PROCEDURE — 99232 SBSQ HOSP IP/OBS MODERATE 35: CPT | Mod: ,,, | Performed by: INTERNAL MEDICINE

## 2019-01-01 PROCEDURE — 63600175 PHARM REV CODE 636 W HCPCS: Performed by: NURSE ANESTHETIST, CERTIFIED REGISTERED

## 2019-01-01 PROCEDURE — 83690 ASSAY OF LIPASE: CPT

## 2019-01-01 PROCEDURE — 99232 PR SUBSEQUENT HOSPITAL CARE,LEVL II: ICD-10-PCS | Mod: ,,, | Performed by: INTERNAL MEDICINE

## 2019-01-01 PROCEDURE — 99233 SBSQ HOSP IP/OBS HIGH 50: CPT | Mod: ,,, | Performed by: INTERNAL MEDICINE

## 2019-01-01 PROCEDURE — 85730 THROMBOPLASTIN TIME PARTIAL: CPT

## 2019-01-01 PROCEDURE — 82550 ASSAY OF CK (CPK): CPT

## 2019-01-01 PROCEDURE — 93005 ELECTROCARDIOGRAM TRACING: CPT

## 2019-01-01 PROCEDURE — 96374 THER/PROPH/DIAG INJ IV PUSH: CPT

## 2019-01-01 PROCEDURE — 37000009 HC ANESTHESIA EA ADD 15 MINS: Performed by: INTERNAL MEDICINE

## 2019-01-01 PROCEDURE — 25000003 PHARM REV CODE 250: Performed by: NURSE ANESTHETIST, CERTIFIED REGISTERED

## 2019-01-01 PROCEDURE — 21400001 HC TELEMETRY ROOM

## 2019-01-01 PROCEDURE — 33249 INSJ/RPLCMT DEFIB W/LEAD(S): CPT | Performed by: INTERNAL MEDICINE

## 2019-01-01 PROCEDURE — 94761 N-INVAS EAR/PLS OXIMETRY MLT: CPT

## 2019-01-01 PROCEDURE — 84100 ASSAY OF PHOSPHORUS: CPT

## 2019-01-01 PROCEDURE — C1895 LEAD, AICD, ENDO DUAL COIL: HCPCS | Performed by: INTERNAL MEDICINE

## 2019-01-01 PROCEDURE — 84484 ASSAY OF TROPONIN QUANT: CPT | Mod: 91

## 2019-01-01 PROCEDURE — 63600175 PHARM REV CODE 636 W HCPCS: Performed by: INTERNAL MEDICINE

## 2019-01-01 PROCEDURE — 20600001 HC STEP DOWN PRIVATE ROOM

## 2019-01-01 PROCEDURE — 83735 ASSAY OF MAGNESIUM: CPT

## 2019-01-01 PROCEDURE — 80320 DRUG SCREEN QUANTALCOHOLS: CPT

## 2019-01-01 PROCEDURE — 27000221 HC OXYGEN, UP TO 24 HOURS

## 2019-01-01 PROCEDURE — 36415 COLL VENOUS BLD VENIPUNCTURE: CPT

## 2019-01-01 PROCEDURE — 99233 SBSQ HOSP IP/OBS HIGH 50: CPT | Mod: ,,, | Performed by: NURSE PRACTITIONER

## 2019-01-01 PROCEDURE — 99239 PR HOSPITAL DISCHARGE DAY,>30 MIN: ICD-10-PCS | Mod: ,,, | Performed by: NURSE PRACTITIONER

## 2019-01-01 PROCEDURE — 25500020 PHARM REV CODE 255: Performed by: INTERNAL MEDICINE

## 2019-01-01 PROCEDURE — 87040 BLOOD CULTURE FOR BACTERIA: CPT

## 2019-01-01 PROCEDURE — 83605 ASSAY OF LACTIC ACID: CPT

## 2019-01-01 PROCEDURE — 83880 ASSAY OF NATRIURETIC PEPTIDE: CPT

## 2019-01-01 PROCEDURE — 80061 LIPID PANEL: CPT

## 2019-01-01 PROCEDURE — 99152 MOD SED SAME PHYS/QHP 5/>YRS: CPT | Mod: ,,, | Performed by: INTERNAL MEDICINE

## 2019-01-01 PROCEDURE — 80307 DRUG TEST PRSMV CHEM ANLYZR: CPT

## 2019-01-01 PROCEDURE — 63600175 PHARM REV CODE 636 W HCPCS: Performed by: EMERGENCY MEDICINE

## 2019-01-01 PROCEDURE — 93459: ICD-10-PCS | Mod: 26,,, | Performed by: INTERNAL MEDICINE

## 2019-01-01 PROCEDURE — 99233 PR SUBSEQUENT HOSPITAL CARE,LEVL III: ICD-10-PCS | Mod: 25,,, | Performed by: INTERNAL MEDICINE

## 2019-01-01 PROCEDURE — C1894 INTRO/SHEATH, NON-LASER: HCPCS | Performed by: INTERNAL MEDICINE

## 2019-01-01 PROCEDURE — 80048 BASIC METABOLIC PNL TOTAL CA: CPT

## 2019-01-01 PROCEDURE — 37000008 HC ANESTHESIA 1ST 15 MINUTES: Performed by: INTERNAL MEDICINE

## 2019-01-01 PROCEDURE — 99291 PR CRITICAL CARE, E/M 30-74 MINUTES: ICD-10-PCS | Mod: ,,, | Performed by: INTERNAL MEDICINE

## 2019-01-01 PROCEDURE — 33249 INSJ/RPLCMT DEFIB W/LEAD(S): CPT | Mod: ,,, | Performed by: INTERNAL MEDICINE

## 2019-01-01 PROCEDURE — 99291 CRITICAL CARE FIRST HOUR: CPT | Mod: ,,, | Performed by: INTERNAL MEDICINE

## 2019-01-01 PROCEDURE — 99291 CRITICAL CARE FIRST HOUR: CPT | Mod: 25

## 2019-01-01 PROCEDURE — 25500020 PHARM REV CODE 255: Performed by: EMERGENCY MEDICINE

## 2019-01-01 PROCEDURE — 99233 PR SUBSEQUENT HOSPITAL CARE,LEVL III: ICD-10-PCS | Mod: ,,, | Performed by: INTERNAL MEDICINE

## 2019-01-01 PROCEDURE — 96365 THER/PROPH/DIAG IV INF INIT: CPT

## 2019-01-01 PROCEDURE — 63600175 PHARM REV CODE 636 W HCPCS: Performed by: STUDENT IN AN ORGANIZED HEALTH CARE EDUCATION/TRAINING PROGRAM

## 2019-01-01 PROCEDURE — 25000242 PHARM REV CODE 250 ALT 637 W/ HCPCS: Performed by: EMERGENCY MEDICINE

## 2019-01-01 PROCEDURE — 93010 ELECTROCARDIOGRAM REPORT: CPT | Mod: ,,, | Performed by: INTERNAL MEDICINE

## 2019-01-01 PROCEDURE — 99152 PR MOD CONSCIOUS SEDATION, SAME PHYS, 5+ YRS, FIRST 15 MIN: ICD-10-PCS | Mod: ,,, | Performed by: INTERNAL MEDICINE

## 2019-01-01 PROCEDURE — 25000003 PHARM REV CODE 250: Performed by: NURSE PRACTITIONER

## 2019-01-01 PROCEDURE — 99239 HOSP IP/OBS DSCHRG MGMT >30: CPT | Mod: ,,, | Performed by: NURSE PRACTITIONER

## 2019-01-01 PROCEDURE — 11000001 HC ACUTE MED/SURG PRIVATE ROOM

## 2019-01-01 PROCEDURE — 94799 UNLISTED PULMONARY SVC/PX: CPT

## 2019-01-01 PROCEDURE — D9220A PRA ANESTHESIA: Mod: ANES,,, | Performed by: ANESTHESIOLOGY

## 2019-01-01 PROCEDURE — 99152 MOD SED SAME PHYS/QHP 5/>YRS: CPT | Performed by: INTERNAL MEDICINE

## 2019-01-01 PROCEDURE — 99233 PR SUBSEQUENT HOSPITAL CARE,LEVL III: ICD-10-PCS | Mod: ,,, | Performed by: NURSE PRACTITIONER

## 2019-01-01 PROCEDURE — G0378 HOSPITAL OBSERVATION PER HR: HCPCS

## 2019-01-01 PROCEDURE — 99153 MOD SED SAME PHYS/QHP EA: CPT | Performed by: INTERNAL MEDICINE

## 2019-01-01 PROCEDURE — 85610 PROTHROMBIN TIME: CPT

## 2019-01-01 PROCEDURE — 84484 ASSAY OF TROPONIN QUANT: CPT

## 2019-01-01 PROCEDURE — 99233 SBSQ HOSP IP/OBS HIGH 50: CPT | Mod: 25,,, | Performed by: INTERNAL MEDICINE

## 2019-01-01 PROCEDURE — 82150 ASSAY OF AMYLASE: CPT

## 2019-01-01 PROCEDURE — 80074 ACUTE HEPATITIS PANEL: CPT

## 2019-01-01 PROCEDURE — 93295 DEV INTERROG REMOTE 1/2/MLT: CPT | Mod: ,,, | Performed by: INTERNAL MEDICINE

## 2019-01-01 PROCEDURE — 94640 AIRWAY INHALATION TREATMENT: CPT

## 2019-01-01 PROCEDURE — 99285 EMERGENCY DEPT VISIT HI MDM: CPT | Mod: 25

## 2019-01-01 PROCEDURE — 84145 PROCALCITONIN (PCT): CPT

## 2019-01-01 PROCEDURE — 12000002 HC ACUTE/MED SURGE SEMI-PRIVATE ROOM

## 2019-01-01 PROCEDURE — D9220A PRA ANESTHESIA: ICD-10-PCS | Mod: CRNA,,, | Performed by: NURSE ANESTHETIST, CERTIFIED REGISTERED

## 2019-01-01 PROCEDURE — C1721 AICD, DUAL CHAMBER: HCPCS | Performed by: INTERNAL MEDICINE

## 2019-01-01 PROCEDURE — 82553 CREATINE MB FRACTION: CPT

## 2019-01-01 PROCEDURE — 96374 THER/PROPH/DIAG INJ IV PUSH: CPT | Mod: 59

## 2019-01-01 PROCEDURE — 20000000 HC ICU ROOM

## 2019-01-01 PROCEDURE — D9220A PRA ANESTHESIA: Mod: CRNA,,, | Performed by: NURSE ANESTHETIST, CERTIFIED REGISTERED

## 2019-01-01 PROCEDURE — 83540 ASSAY OF IRON: CPT

## 2019-01-01 PROCEDURE — D9220A PRA ANESTHESIA: ICD-10-PCS | Mod: ANES,,, | Performed by: ANESTHESIOLOGY

## 2019-01-01 PROCEDURE — 33249 PR ICD INSERT SNGL/DUAL W/LEADS: ICD-10-PCS | Mod: ,,, | Performed by: INTERNAL MEDICINE

## 2019-01-01 PROCEDURE — 93010 ELECTROCARDIOGRAM REPORT: CPT | Mod: 76,,, | Performed by: INTERNAL MEDICINE

## 2019-01-01 PROCEDURE — C1769 GUIDE WIRE: HCPCS | Performed by: INTERNAL MEDICINE

## 2019-01-01 PROCEDURE — 96375 TX/PRO/DX INJ NEW DRUG ADDON: CPT

## 2019-01-01 PROCEDURE — 99900035 HC TECH TIME PER 15 MIN (STAT)

## 2019-01-01 PROCEDURE — 93296 REM INTERROG EVL PM/IDS: CPT | Performed by: INTERNAL MEDICINE

## 2019-01-01 PROCEDURE — 93010 EKG 12-LEAD: ICD-10-PCS | Mod: 76,,, | Performed by: INTERNAL MEDICINE

## 2019-01-01 PROCEDURE — 82728 ASSAY OF FERRITIN: CPT

## 2019-01-01 PROCEDURE — 83036 HEMOGLOBIN GLYCOSYLATED A1C: CPT

## 2019-01-01 PROCEDURE — 93459 L HRT ART/GRFT ANGIO: CPT | Performed by: INTERNAL MEDICINE

## 2019-01-01 DEVICE — IMPLANTABLE DEVICE
Type: IMPLANTABLE DEVICE | Site: CHEST | Status: FUNCTIONAL
Brand: INTICA 7 VR-T DX

## 2019-01-01 DEVICE — IMPLANTABLE DEVICE
Type: IMPLANTABLE DEVICE | Site: HEART | Status: FUNCTIONAL
Brand: PLEXA PROMRI

## 2019-01-01 RX ORDER — AMIODARONE HYDROCHLORIDE 400 MG/1
400 TABLET ORAL DAILY
Qty: 30 TABLET | Refills: 11 | Status: ON HOLD | OUTPATIENT
Start: 2019-01-01 | End: 2020-01-01 | Stop reason: SDUPTHER

## 2019-01-01 RX ORDER — DEXTROMETHORPHAN POLISTIREX 30 MG/5 ML
1 SUSPENSION, EXTENDED RELEASE 12 HR ORAL ONCE
Status: DISCONTINUED | OUTPATIENT
Start: 2019-01-01 | End: 2019-01-01

## 2019-01-01 RX ORDER — AMIODARONE HYDROCHLORIDE 200 MG/1
400 TABLET ORAL DAILY
Status: DISCONTINUED | OUTPATIENT
Start: 2019-01-01 | End: 2019-01-01 | Stop reason: HOSPADM

## 2019-01-01 RX ORDER — ONDANSETRON HYDROCHLORIDE 4 MG/5ML
4 SOLUTION ORAL EVERY 6 HOURS PRN
Status: DISCONTINUED | OUTPATIENT
Start: 2019-01-01 | End: 2019-01-01

## 2019-01-01 RX ORDER — ASPIRIN 325 MG
325 TABLET ORAL
Status: COMPLETED | OUTPATIENT
Start: 2019-01-01 | End: 2019-01-01

## 2019-01-01 RX ORDER — FUROSEMIDE 40 MG/1
40 TABLET ORAL 2 TIMES DAILY
Status: DISCONTINUED | OUTPATIENT
Start: 2019-01-01 | End: 2019-01-01 | Stop reason: HOSPADM

## 2019-01-01 RX ORDER — CARVEDILOL 6.25 MG/1
12.5 TABLET ORAL 2 TIMES DAILY
Status: DISCONTINUED | OUTPATIENT
Start: 2019-01-01 | End: 2019-01-01

## 2019-01-01 RX ORDER — HYDRALAZINE HYDROCHLORIDE 20 MG/ML
10 INJECTION INTRAMUSCULAR; INTRAVENOUS EVERY 6 HOURS PRN
Status: DISCONTINUED | OUTPATIENT
Start: 2019-01-01 | End: 2019-01-01 | Stop reason: HOSPADM

## 2019-01-01 RX ORDER — AZITHROMYCIN 250 MG/1
500 TABLET, FILM COATED ORAL DAILY
Status: DISCONTINUED | OUTPATIENT
Start: 2019-01-01 | End: 2019-01-01 | Stop reason: HOSPADM

## 2019-01-01 RX ORDER — SPIRONOLACTONE 25 MG/1
25 TABLET ORAL DAILY
Status: DISCONTINUED | OUTPATIENT
Start: 2019-01-01 | End: 2019-01-01 | Stop reason: HOSPADM

## 2019-01-01 RX ORDER — ENOXAPARIN SODIUM 100 MG/ML
1 INJECTION SUBCUTANEOUS
Status: DISCONTINUED | OUTPATIENT
Start: 2019-01-01 | End: 2019-01-01

## 2019-01-01 RX ORDER — IPRATROPIUM BROMIDE AND ALBUTEROL SULFATE 2.5; .5 MG/3ML; MG/3ML
3 SOLUTION RESPIRATORY (INHALATION)
Status: COMPLETED | OUTPATIENT
Start: 2019-01-01 | End: 2019-01-01

## 2019-01-01 RX ORDER — ONDANSETRON 2 MG/ML
8 INJECTION INTRAMUSCULAR; INTRAVENOUS EVERY 8 HOURS PRN
Status: DISCONTINUED | OUTPATIENT
Start: 2019-01-01 | End: 2019-01-01

## 2019-01-01 RX ORDER — FUROSEMIDE 10 MG/ML
40 INJECTION INTRAMUSCULAR; INTRAVENOUS
Status: COMPLETED | OUTPATIENT
Start: 2019-01-01 | End: 2019-01-01

## 2019-01-01 RX ORDER — MIDAZOLAM HYDROCHLORIDE 1 MG/ML
INJECTION, SOLUTION INTRAMUSCULAR; INTRAVENOUS
Status: DISCONTINUED | OUTPATIENT
Start: 2019-01-01 | End: 2019-01-01

## 2019-01-01 RX ORDER — CARVEDILOL 25 MG/1
25 TABLET ORAL 2 TIMES DAILY
Status: DISCONTINUED | OUTPATIENT
Start: 2019-01-01 | End: 2019-01-01 | Stop reason: HOSPADM

## 2019-01-01 RX ORDER — ACETAMINOPHEN 500 MG
500 TABLET ORAL EVERY 6 HOURS PRN
Status: DISCONTINUED | OUTPATIENT
Start: 2019-01-01 | End: 2019-01-01 | Stop reason: HOSPADM

## 2019-01-01 RX ORDER — HEPARIN SODIUM,PORCINE/D5W 25000/250
12 INTRAVENOUS SOLUTION INTRAVENOUS CONTINUOUS
Status: DISCONTINUED | OUTPATIENT
Start: 2019-01-01 | End: 2019-01-01

## 2019-01-01 RX ORDER — NITROGLYCERIN 0.4 MG/1
0.4 TABLET SUBLINGUAL EVERY 5 MIN PRN
Status: COMPLETED | OUTPATIENT
Start: 2019-01-01 | End: 2019-01-01

## 2019-01-01 RX ORDER — KETAMINE HCL IN 0.9 % NACL 50 MG/5 ML
SYRINGE (ML) INTRAVENOUS
Status: DISCONTINUED | OUTPATIENT
Start: 2019-01-01 | End: 2019-01-01

## 2019-01-01 RX ORDER — POTASSIUM CHLORIDE 20 MEQ/1
40 TABLET, EXTENDED RELEASE ORAL ONCE
Status: COMPLETED | OUTPATIENT
Start: 2019-01-01 | End: 2019-01-01

## 2019-01-01 RX ORDER — SIMETHICONE 80 MG
2 TABLET,CHEWABLE ORAL 3 TIMES DAILY PRN
Status: DISCONTINUED | OUTPATIENT
Start: 2019-01-01 | End: 2019-01-01 | Stop reason: HOSPADM

## 2019-01-01 RX ORDER — GLYCOPYRROLATE 0.2 MG/ML
INJECTION INTRAMUSCULAR; INTRAVENOUS
Status: DISCONTINUED | OUTPATIENT
Start: 2019-01-01 | End: 2019-01-01

## 2019-01-01 RX ORDER — CEFAZOLIN SODIUM 1 G/3ML
INJECTION, POWDER, FOR SOLUTION INTRAMUSCULAR; INTRAVENOUS
Status: DISCONTINUED | OUTPATIENT
Start: 2019-01-01 | End: 2019-01-01

## 2019-01-01 RX ORDER — ISOSORBIDE MONONITRATE 60 MG/1
60 TABLET, EXTENDED RELEASE ORAL DAILY
Status: DISCONTINUED | OUTPATIENT
Start: 2019-01-01 | End: 2019-01-01

## 2019-01-01 RX ORDER — LIDOCAINE HCL/PF 100 MG/5ML
SYRINGE (ML) INTRAVENOUS
Status: DISCONTINUED | OUTPATIENT
Start: 2019-01-01 | End: 2019-01-01

## 2019-01-01 RX ORDER — NITROGLYCERIN 0.4 MG/1
0.4 TABLET SUBLINGUAL EVERY 5 MIN PRN
Status: DISCONTINUED | OUTPATIENT
Start: 2019-01-01 | End: 2019-01-01 | Stop reason: HOSPADM

## 2019-01-01 RX ORDER — HYDRALAZINE HYDROCHLORIDE 20 MG/ML
10 INJECTION INTRAMUSCULAR; INTRAVENOUS EVERY 6 HOURS PRN
Status: DISCONTINUED | OUTPATIENT
Start: 2019-01-01 | End: 2019-01-01

## 2019-01-01 RX ORDER — ONDANSETRON 2 MG/ML
4 INJECTION INTRAMUSCULAR; INTRAVENOUS EVERY 6 HOURS PRN
Status: DISCONTINUED | OUTPATIENT
Start: 2019-01-01 | End: 2019-01-01

## 2019-01-01 RX ORDER — SPIRONOLACTONE 25 MG/1
50 TABLET ORAL DAILY
Status: DISCONTINUED | OUTPATIENT
Start: 2019-01-01 | End: 2019-01-01

## 2019-01-01 RX ORDER — ENOXAPARIN SODIUM 100 MG/ML
40 INJECTION SUBCUTANEOUS EVERY 24 HOURS
Status: DISCONTINUED | OUTPATIENT
Start: 2019-01-01 | End: 2019-01-01 | Stop reason: HOSPADM

## 2019-01-01 RX ORDER — CARVEDILOL 6.25 MG/1
25 TABLET ORAL 2 TIMES DAILY
Status: DISCONTINUED | OUTPATIENT
Start: 2019-01-01 | End: 2019-01-01

## 2019-01-01 RX ORDER — SODIUM CHLORIDE 0.9 % (FLUSH) 0.9 %
10 SYRINGE (ML) INJECTION
Status: DISCONTINUED | OUTPATIENT
Start: 2019-01-01 | End: 2019-01-01

## 2019-01-01 RX ORDER — MORPHINE SULFATE 10 MG/ML
2 INJECTION INTRAMUSCULAR; INTRAVENOUS; SUBCUTANEOUS
Status: DISCONTINUED | OUTPATIENT
Start: 2019-01-01 | End: 2019-01-01 | Stop reason: HOSPADM

## 2019-01-01 RX ORDER — AMOXICILLIN AND CLAVULANATE POTASSIUM 875; 125 MG/1; MG/1
1 TABLET, FILM COATED ORAL EVERY 12 HOURS
Status: DISCONTINUED | OUTPATIENT
Start: 2019-01-01 | End: 2019-01-01

## 2019-01-01 RX ORDER — MIDAZOLAM HYDROCHLORIDE 1 MG/ML
INJECTION, SOLUTION INTRAMUSCULAR; INTRAVENOUS
Status: DISCONTINUED | OUTPATIENT
Start: 2019-01-01 | End: 2019-01-01 | Stop reason: HOSPADM

## 2019-01-01 RX ORDER — SPIRONOLACTONE 25 MG/1
25 TABLET ORAL DAILY
Status: DISCONTINUED | OUTPATIENT
Start: 2019-01-01 | End: 2019-01-01

## 2019-01-01 RX ORDER — METOPROLOL TARTRATE 1 MG/ML
5 INJECTION, SOLUTION INTRAVENOUS EVERY 5 MIN PRN
Status: DISCONTINUED | OUTPATIENT
Start: 2019-01-01 | End: 2019-01-01

## 2019-01-01 RX ORDER — CLOPIDOGREL 300 MG/1
600 TABLET, FILM COATED ORAL ONCE
Status: COMPLETED | OUTPATIENT
Start: 2019-01-01 | End: 2019-01-01

## 2019-01-01 RX ORDER — CLOPIDOGREL BISULFATE 75 MG/1
75 TABLET ORAL DAILY
Qty: 30 TABLET | Refills: 11 | Status: ON HOLD | OUTPATIENT
Start: 2019-01-01 | End: 2020-01-01 | Stop reason: SDUPTHER

## 2019-01-01 RX ORDER — ISOSORBIDE MONONITRATE 120 MG/1
120 TABLET, EXTENDED RELEASE ORAL DAILY
Qty: 30 TABLET | Refills: 11 | Status: ON HOLD | OUTPATIENT
Start: 2019-01-01 | End: 2020-01-01 | Stop reason: SDUPTHER

## 2019-01-01 RX ORDER — FUROSEMIDE 10 MG/ML
40 INJECTION INTRAMUSCULAR; INTRAVENOUS 2 TIMES DAILY
Status: DISCONTINUED | OUTPATIENT
Start: 2019-01-01 | End: 2019-01-01

## 2019-01-01 RX ORDER — LISINOPRIL 20 MG/1
40 TABLET ORAL DAILY
Status: DISCONTINUED | OUTPATIENT
Start: 2019-01-01 | End: 2019-01-01 | Stop reason: HOSPADM

## 2019-01-01 RX ORDER — FUROSEMIDE 10 MG/ML
40 INJECTION INTRAMUSCULAR; INTRAVENOUS ONCE
Status: DISCONTINUED | OUTPATIENT
Start: 2019-01-01 | End: 2019-01-01 | Stop reason: HOSPADM

## 2019-01-01 RX ORDER — CEFAZOLIN SODIUM 1 G/3ML
2 INJECTION, POWDER, FOR SOLUTION INTRAMUSCULAR; INTRAVENOUS
Status: COMPLETED | OUTPATIENT
Start: 2019-01-01 | End: 2019-01-01

## 2019-01-01 RX ORDER — PROPOFOL 10 MG/ML
INJECTION, EMULSION INTRAVENOUS CONTINUOUS PRN
Status: DISCONTINUED | OUTPATIENT
Start: 2019-01-01 | End: 2019-01-01

## 2019-01-01 RX ORDER — MORPHINE SULFATE 10 MG/ML
5 INJECTION INTRAMUSCULAR; INTRAVENOUS; SUBCUTANEOUS EVERY 4 HOURS PRN
Status: DISCONTINUED | OUTPATIENT
Start: 2019-01-01 | End: 2019-01-01

## 2019-01-01 RX ORDER — SPIRONOLACTONE 25 MG/1
25 TABLET ORAL DAILY
Qty: 30 TABLET | Refills: 11 | Status: ON HOLD | OUTPATIENT
Start: 2019-01-01 | End: 2020-01-01 | Stop reason: SDUPTHER

## 2019-01-01 RX ORDER — CLOPIDOGREL 300 MG/1
300 TABLET, FILM COATED ORAL
Status: COMPLETED | OUTPATIENT
Start: 2019-01-01 | End: 2019-01-01

## 2019-01-01 RX ORDER — POLYETHYLENE GLYCOL 3350 17 G/17G
17 POWDER, FOR SOLUTION ORAL DAILY
Status: DISCONTINUED | OUTPATIENT
Start: 2019-01-01 | End: 2019-01-01

## 2019-01-01 RX ORDER — VANCOMYCIN HYDROCHLORIDE 1 G/20ML
INJECTION, POWDER, LYOPHILIZED, FOR SOLUTION INTRAVENOUS
Status: DISCONTINUED | OUTPATIENT
Start: 2019-01-01 | End: 2019-01-01

## 2019-01-01 RX ORDER — ACETAMINOPHEN 325 MG/1
650 TABLET ORAL EVERY 6 HOURS PRN
Status: DISCONTINUED | OUTPATIENT
Start: 2019-01-01 | End: 2019-01-01 | Stop reason: HOSPADM

## 2019-01-01 RX ORDER — ATORVASTATIN CALCIUM 40 MG/1
40 TABLET, FILM COATED ORAL NIGHTLY
Status: DISCONTINUED | OUTPATIENT
Start: 2019-01-01 | End: 2019-01-01 | Stop reason: HOSPADM

## 2019-01-01 RX ORDER — PROCHLORPERAZINE EDISYLATE 5 MG/ML
10 INJECTION INTRAMUSCULAR; INTRAVENOUS EVERY 6 HOURS PRN
Status: DISCONTINUED | OUTPATIENT
Start: 2019-01-01 | End: 2019-01-01 | Stop reason: HOSPADM

## 2019-01-01 RX ORDER — LISINOPRIL 5 MG/1
10 TABLET ORAL DAILY
Status: DISCONTINUED | OUTPATIENT
Start: 2019-01-01 | End: 2019-01-01

## 2019-01-01 RX ORDER — HYDROCODONE BITARTRATE AND ACETAMINOPHEN 5; 325 MG/1; MG/1
1 TABLET ORAL EVERY 6 HOURS PRN
Status: DISCONTINUED | OUTPATIENT
Start: 2019-01-01 | End: 2019-01-01

## 2019-01-01 RX ORDER — SODIUM CHLORIDE 0.9 % (FLUSH) 0.9 %
10 SYRINGE (ML) INJECTION
Status: DISCONTINUED | OUTPATIENT
Start: 2019-01-01 | End: 2019-01-01 | Stop reason: HOSPADM

## 2019-01-01 RX ORDER — FUROSEMIDE 10 MG/ML
20 INJECTION INTRAMUSCULAR; INTRAVENOUS
Status: COMPLETED | OUTPATIENT
Start: 2019-01-01 | End: 2019-01-01

## 2019-01-01 RX ORDER — PROCHLORPERAZINE EDISYLATE 5 MG/ML
10 INJECTION INTRAMUSCULAR; INTRAVENOUS EVERY 6 HOURS PRN
Status: DISCONTINUED | OUTPATIENT
Start: 2019-01-01 | End: 2019-01-01

## 2019-01-01 RX ORDER — ONDANSETRON 2 MG/ML
4 INJECTION INTRAMUSCULAR; INTRAVENOUS EVERY 8 HOURS PRN
Status: DISCONTINUED | OUTPATIENT
Start: 2019-01-01 | End: 2019-01-01 | Stop reason: HOSPADM

## 2019-01-01 RX ORDER — CEPHALEXIN 500 MG/1
500 TABLET ORAL 3 TIMES DAILY
Qty: 15 TABLET | Refills: 0 | Status: SHIPPED | OUTPATIENT
Start: 2019-01-01 | End: 2019-01-01

## 2019-01-01 RX ORDER — GUAIFENESIN/DEXTROMETHORPHAN 100-10MG/5
10 SYRUP ORAL EVERY 6 HOURS
Status: DISCONTINUED | OUTPATIENT
Start: 2019-01-01 | End: 2019-01-01 | Stop reason: HOSPADM

## 2019-01-01 RX ORDER — ONDANSETRON 2 MG/ML
8 INJECTION INTRAMUSCULAR; INTRAVENOUS EVERY 8 HOURS PRN
Status: DISCONTINUED | OUTPATIENT
Start: 2019-01-01 | End: 2019-01-01 | Stop reason: HOSPADM

## 2019-01-01 RX ORDER — HYDROCODONE BITARTRATE AND ACETAMINOPHEN 5; 325 MG/1; MG/1
1 TABLET ORAL
Status: COMPLETED | OUTPATIENT
Start: 2019-01-01 | End: 2019-01-01

## 2019-01-01 RX ORDER — LIDOCAINE HYDROCHLORIDE 20 MG/ML
INJECTION, SOLUTION INFILTRATION; PERINEURAL
Status: DISCONTINUED | OUTPATIENT
Start: 2019-01-01 | End: 2019-01-01

## 2019-01-01 RX ORDER — ISOSORBIDE MONONITRATE 60 MG/1
120 TABLET, EXTENDED RELEASE ORAL DAILY
Status: DISCONTINUED | OUTPATIENT
Start: 2019-01-01 | End: 2019-01-01 | Stop reason: HOSPADM

## 2019-01-01 RX ORDER — ACETAMINOPHEN 325 MG/1
650 TABLET ORAL EVERY 4 HOURS PRN
Status: DISCONTINUED | OUTPATIENT
Start: 2019-01-01 | End: 2019-01-01

## 2019-01-01 RX ORDER — LIDOCAINE HYDROCHLORIDE 10 MG/ML
INJECTION, SOLUTION EPIDURAL; INFILTRATION; INTRACAUDAL; PERINEURAL
Status: DISCONTINUED | OUTPATIENT
Start: 2019-01-01 | End: 2019-01-01 | Stop reason: HOSPADM

## 2019-01-01 RX ORDER — FUROSEMIDE 40 MG/1
40 TABLET ORAL 2 TIMES DAILY
Qty: 180 TABLET | Refills: 3 | Status: ON HOLD | OUTPATIENT
Start: 2019-01-01 | End: 2020-01-01 | Stop reason: SDUPTHER

## 2019-01-01 RX ORDER — ATORVASTATIN CALCIUM 40 MG/1
80 TABLET, FILM COATED ORAL DAILY
Status: DISCONTINUED | OUTPATIENT
Start: 2019-01-01 | End: 2019-01-01 | Stop reason: HOSPADM

## 2019-01-01 RX ORDER — AMIODARONE HYDROCHLORIDE 200 MG/1
400 TABLET ORAL 2 TIMES DAILY
Status: DISCONTINUED | OUTPATIENT
Start: 2019-01-01 | End: 2019-01-01 | Stop reason: HOSPADM

## 2019-01-01 RX ORDER — OXYCODONE AND ACETAMINOPHEN 5; 325 MG/1; MG/1
1 TABLET ORAL ONCE AS NEEDED
Status: COMPLETED | OUTPATIENT
Start: 2019-01-01 | End: 2019-01-01

## 2019-01-01 RX ORDER — AMOXICILLIN 250 MG
1 CAPSULE ORAL 2 TIMES DAILY PRN
Status: DISCONTINUED | OUTPATIENT
Start: 2019-01-01 | End: 2019-01-01 | Stop reason: HOSPADM

## 2019-01-01 RX ORDER — DIAZEPAM 5 MG/1
5 TABLET ORAL EVERY 8 HOURS PRN
Status: DISCONTINUED | OUTPATIENT
Start: 2019-01-01 | End: 2019-01-01 | Stop reason: HOSPADM

## 2019-01-01 RX ORDER — FENTANYL CITRATE 50 UG/ML
INJECTION, SOLUTION INTRAMUSCULAR; INTRAVENOUS
Status: DISCONTINUED | OUTPATIENT
Start: 2019-01-01 | End: 2019-01-01

## 2019-01-01 RX ORDER — ASPIRIN 81 MG/1
81 TABLET ORAL DAILY
Status: DISCONTINUED | OUTPATIENT
Start: 2019-01-01 | End: 2019-01-01 | Stop reason: HOSPADM

## 2019-01-01 RX ORDER — SODIUM CHLORIDE 0.9 G/100ML
IRRIGANT IRRIGATION
Status: DISCONTINUED | OUTPATIENT
Start: 2019-01-01 | End: 2019-01-01

## 2019-01-01 RX ORDER — FENTANYL CITRATE 50 UG/ML
INJECTION, SOLUTION INTRAMUSCULAR; INTRAVENOUS
Status: DISCONTINUED | OUTPATIENT
Start: 2019-01-01 | End: 2019-01-01 | Stop reason: HOSPADM

## 2019-01-01 RX ORDER — ISOSORBIDE MONONITRATE 30 MG/1
30 TABLET, EXTENDED RELEASE ORAL DAILY
Status: DISCONTINUED | OUTPATIENT
Start: 2019-01-01 | End: 2019-01-01

## 2019-01-01 RX ORDER — MORPHINE SULFATE 2 MG/ML
2 INJECTION, SOLUTION INTRAMUSCULAR; INTRAVENOUS ONCE
Status: COMPLETED | OUTPATIENT
Start: 2019-01-01 | End: 2019-01-01

## 2019-01-01 RX ORDER — LISINOPRIL 40 MG/1
40 TABLET ORAL DAILY
Qty: 30 TABLET | Refills: 6 | Status: ON HOLD | OUTPATIENT
Start: 2019-01-01 | End: 2020-01-01 | Stop reason: SDUPTHER

## 2019-01-01 RX ORDER — ACETAMINOPHEN 500 MG
500 TABLET ORAL EVERY 6 HOURS PRN
Refills: 0 | Status: ON HOLD | COMMUNITY
Start: 2019-01-01 | End: 2019-01-01 | Stop reason: HOSPADM

## 2019-01-01 RX ORDER — RAMELTEON 8 MG/1
8 TABLET ORAL NIGHTLY PRN
Status: DISCONTINUED | OUTPATIENT
Start: 2019-01-01 | End: 2019-01-01 | Stop reason: HOSPADM

## 2019-01-01 RX ORDER — LABETALOL HYDROCHLORIDE 5 MG/ML
10 INJECTION, SOLUTION INTRAVENOUS EVERY 6 HOURS PRN
Status: DISCONTINUED | OUTPATIENT
Start: 2019-01-01 | End: 2019-01-01 | Stop reason: HOSPADM

## 2019-01-01 RX ORDER — MORPHINE SULFATE 1 MG/ML
5 INJECTION, SOLUTION EPIDURAL; INTRATHECAL; INTRAVENOUS EVERY 4 HOURS PRN
Status: DISCONTINUED | OUTPATIENT
Start: 2019-01-01 | End: 2019-01-01

## 2019-01-01 RX ORDER — CLONIDINE HYDROCHLORIDE 0.1 MG/1
0.1 TABLET ORAL 3 TIMES DAILY PRN
Status: DISCONTINUED | OUTPATIENT
Start: 2019-01-01 | End: 2019-01-01

## 2019-01-01 RX ORDER — ATORVASTATIN CALCIUM 80 MG/1
80 TABLET, FILM COATED ORAL DAILY
Qty: 30 TABLET | Refills: 11 | Status: ON HOLD | OUTPATIENT
Start: 2019-01-01 | End: 2020-01-01 | Stop reason: SDUPTHER

## 2019-01-01 RX ORDER — FUROSEMIDE 10 MG/ML
40 INJECTION INTRAMUSCULAR; INTRAVENOUS ONCE
Status: COMPLETED | OUTPATIENT
Start: 2019-01-01 | End: 2019-01-01

## 2019-01-01 RX ORDER — NAPROXEN SODIUM 220 MG/1
81 TABLET, FILM COATED ORAL DAILY
Status: DISCONTINUED | OUTPATIENT
Start: 2019-01-01 | End: 2019-01-01 | Stop reason: HOSPADM

## 2019-01-01 RX ORDER — CLOPIDOGREL BISULFATE 75 MG/1
75 TABLET ORAL DAILY
Status: DISCONTINUED | OUTPATIENT
Start: 2019-01-01 | End: 2019-01-01 | Stop reason: HOSPADM

## 2019-01-01 RX ORDER — NITROGLYCERIN 0.4 MG/1
0.4 TABLET SUBLINGUAL EVERY 5 MIN PRN
Qty: 30 TABLET | Refills: 2 | Status: ON HOLD | OUTPATIENT
Start: 2019-01-01 | End: 2020-01-01 | Stop reason: SDUPTHER

## 2019-01-01 RX ORDER — CARVEDILOL 6.25 MG/1
25 TABLET ORAL 2 TIMES DAILY
Status: DISCONTINUED | OUTPATIENT
Start: 2019-01-01 | End: 2019-01-01 | Stop reason: HOSPADM

## 2019-01-01 RX ORDER — ONDANSETRON 2 MG/ML
4 INJECTION INTRAMUSCULAR; INTRAVENOUS
Status: DISCONTINUED | OUTPATIENT
Start: 2019-01-01 | End: 2019-01-01

## 2019-01-01 RX ORDER — PROPOFOL 10 MG/ML
INJECTION, EMULSION INTRAVENOUS
Status: DISCONTINUED | OUTPATIENT
Start: 2019-01-01 | End: 2019-01-01

## 2019-01-01 RX ORDER — SODIUM CHLORIDE 9 MG/ML
INJECTION, SOLUTION INTRAVENOUS CONTINUOUS
Status: ACTIVE | OUTPATIENT
Start: 2019-01-01 | End: 2019-01-01

## 2019-01-01 RX ORDER — NITROGLYCERIN 20 MG/100ML
10 INJECTION INTRAVENOUS CONTINUOUS
Status: DISCONTINUED | OUTPATIENT
Start: 2019-01-01 | End: 2019-01-01

## 2019-01-01 RX ORDER — HYDRALAZINE HYDROCHLORIDE 20 MG/ML
10 INJECTION INTRAMUSCULAR; INTRAVENOUS ONCE
Status: COMPLETED | OUTPATIENT
Start: 2019-01-01 | End: 2019-01-01

## 2019-01-01 RX ORDER — FENTANYL CITRATE 50 UG/ML
25 INJECTION, SOLUTION INTRAMUSCULAR; INTRAVENOUS EVERY 5 MIN PRN
Status: DISCONTINUED | OUTPATIENT
Start: 2019-01-01 | End: 2019-01-01

## 2019-01-01 RX ORDER — FUROSEMIDE 40 MG/1
40 TABLET ORAL 2 TIMES DAILY
Qty: 180 TABLET | Refills: 0 | Status: ON HOLD | OUTPATIENT
Start: 2019-01-01 | End: 2019-01-01 | Stop reason: SDUPTHER

## 2019-01-01 RX ORDER — BENZONATATE 100 MG/1
200 CAPSULE ORAL 3 TIMES DAILY
Status: DISCONTINUED | OUTPATIENT
Start: 2019-01-01 | End: 2019-01-01 | Stop reason: HOSPADM

## 2019-01-01 RX ORDER — ONDANSETRON 2 MG/ML
4 INJECTION INTRAMUSCULAR; INTRAVENOUS
Status: COMPLETED | OUTPATIENT
Start: 2019-01-01 | End: 2019-01-01

## 2019-01-01 RX ORDER — POLYETHYLENE GLYCOL 3350 17 G/17G
17 POWDER, FOR SOLUTION ORAL 2 TIMES DAILY PRN
Status: DISCONTINUED | OUTPATIENT
Start: 2019-01-01 | End: 2019-01-01 | Stop reason: HOSPADM

## 2019-01-01 RX ORDER — REGADENOSON 0.08 MG/ML
0.4 INJECTION, SOLUTION INTRAVENOUS ONCE
Status: COMPLETED | OUTPATIENT
Start: 2019-01-01 | End: 2019-01-01

## 2019-01-01 RX ORDER — BUPIVACAINE HYDROCHLORIDE 2.5 MG/ML
INJECTION, SOLUTION EPIDURAL; INFILTRATION; INTRACAUDAL
Status: DISCONTINUED | OUTPATIENT
Start: 2019-01-01 | End: 2019-01-01

## 2019-01-01 RX ORDER — ATORVASTATIN CALCIUM 20 MG/1
80 TABLET, FILM COATED ORAL DAILY
Status: DISCONTINUED | OUTPATIENT
Start: 2019-01-01 | End: 2019-01-01 | Stop reason: HOSPADM

## 2019-01-01 RX ORDER — SODIUM CHLORIDE 9 MG/ML
INJECTION, SOLUTION INTRAVENOUS CONTINUOUS PRN
Status: DISCONTINUED | OUTPATIENT
Start: 2019-01-01 | End: 2019-01-01

## 2019-01-01 RX ORDER — CARVEDILOL 25 MG/1
25 TABLET ORAL 2 TIMES DAILY
Qty: 60 TABLET | Refills: 11 | Status: ON HOLD | OUTPATIENT
Start: 2019-01-01 | End: 2020-01-01 | Stop reason: SDUPTHER

## 2019-01-01 RX ORDER — ASPIRIN 325 MG
325 TABLET ORAL DAILY
Status: DISCONTINUED | OUTPATIENT
Start: 2019-01-01 | End: 2019-01-01

## 2019-01-01 RX ORDER — HYDROCODONE BITARTRATE AND ACETAMINOPHEN 5; 325 MG/1; MG/1
1 TABLET ORAL EVERY 4 HOURS PRN
Status: DISCONTINUED | OUTPATIENT
Start: 2019-01-01 | End: 2019-01-01 | Stop reason: HOSPADM

## 2019-01-01 RX ORDER — FUROSEMIDE 40 MG/1
40 TABLET ORAL 2 TIMES DAILY
Status: DISCONTINUED | OUTPATIENT
Start: 2019-01-01 | End: 2019-01-01

## 2019-01-01 RX ORDER — AMIODARONE HYDROCHLORIDE 400 MG/1
400 TABLET ORAL 2 TIMES DAILY
Qty: 200 TABLET | Refills: 3 | Status: ON HOLD | OUTPATIENT
Start: 2019-01-01 | End: 2019-01-01 | Stop reason: HOSPADM

## 2019-01-01 RX ORDER — SODIUM CHLORIDE 0.9 % (FLUSH) 0.9 %
3 SYRINGE (ML) INJECTION
Status: DISCONTINUED | OUTPATIENT
Start: 2019-01-01 | End: 2019-01-01

## 2019-01-01 RX ORDER — POLYETHYLENE GLYCOL 3350 17 G/17G
17 POWDER, FOR SOLUTION ORAL DAILY
Status: DISCONTINUED | OUTPATIENT
Start: 2019-01-01 | End: 2019-01-01 | Stop reason: HOSPADM

## 2019-01-01 RX ORDER — ISOSORBIDE MONONITRATE 30 MG/1
120 TABLET, EXTENDED RELEASE ORAL DAILY
Status: DISCONTINUED | OUTPATIENT
Start: 2019-01-01 | End: 2019-01-01 | Stop reason: HOSPADM

## 2019-01-01 RX ORDER — AMOXICILLIN AND CLAVULANATE POTASSIUM 875; 125 MG/1; MG/1
1 TABLET, FILM COATED ORAL EVERY 12 HOURS
Qty: 10 TABLET | Refills: 0 | Status: ON HOLD | OUTPATIENT
Start: 2019-01-01 | End: 2019-01-01 | Stop reason: HOSPADM

## 2019-01-01 RX ADMIN — ASPIRIN 81 MG CHEWABLE TABLET 81 MG: 81 TABLET CHEWABLE at 08:07

## 2019-01-01 RX ADMIN — FUROSEMIDE 40 MG: 40 TABLET ORAL at 09:08

## 2019-01-01 RX ADMIN — NITROGLYCERIN 2 INCH: 20 OINTMENT TOPICAL at 01:07

## 2019-01-01 RX ADMIN — CLOPIDOGREL 75 MG: 75 TABLET, FILM COATED ORAL at 08:08

## 2019-01-01 RX ADMIN — GUAIFENESIN AND DEXTROMETHORPHAN 10 ML: 100; 10 SYRUP ORAL at 05:10

## 2019-01-01 RX ADMIN — GUAIFENESIN AND DEXTROMETHORPHAN 10 ML: 100; 10 SYRUP ORAL at 11:10

## 2019-01-01 RX ADMIN — FUROSEMIDE 40 MG: 10 INJECTION, SOLUTION INTRAVENOUS at 09:10

## 2019-01-01 RX ADMIN — ISOSORBIDE MONONITRATE 120 MG: 30 TABLET, EXTENDED RELEASE ORAL at 09:10

## 2019-01-01 RX ADMIN — MIDAZOLAM HYDROCHLORIDE 1 MG: 1 INJECTION, SOLUTION INTRAMUSCULAR; INTRAVENOUS at 02:08

## 2019-01-01 RX ADMIN — CARVEDILOL 25 MG: 25 TABLET, FILM COATED ORAL at 09:08

## 2019-01-01 RX ADMIN — CLOPIDOGREL 75 MG: 75 TABLET, FILM COATED ORAL at 09:08

## 2019-01-01 RX ADMIN — FENTANYL CITRATE 25 MCG: 50 INJECTION, SOLUTION INTRAMUSCULAR; INTRAVENOUS at 03:08

## 2019-01-01 RX ADMIN — ACETAMINOPHEN 500 MG: 500 TABLET, FILM COATED ORAL at 09:07

## 2019-01-01 RX ADMIN — BENZONATATE 200 MG: 100 CAPSULE ORAL at 08:10

## 2019-01-01 RX ADMIN — ONDANSETRON 4 MG: 2 INJECTION INTRAMUSCULAR; INTRAVENOUS at 09:07

## 2019-01-01 RX ADMIN — DIAZEPAM 5 MG: 5 TABLET ORAL at 06:08

## 2019-01-01 RX ADMIN — ACETAMINOPHEN 500 MG: 500 TABLET, FILM COATED ORAL at 12:08

## 2019-01-01 RX ADMIN — ISOSORBIDE MONONITRATE 60 MG: 60 TABLET, EXTENDED RELEASE ORAL at 08:08

## 2019-01-01 RX ADMIN — CARVEDILOL 25 MG: 6.25 TABLET, FILM COATED ORAL at 11:10

## 2019-01-01 RX ADMIN — SODIUM CHLORIDE: 0.9 INJECTION, SOLUTION INTRAVENOUS at 02:08

## 2019-01-01 RX ADMIN — SPIRONOLACTONE 25 MG: 25 TABLET ORAL at 09:08

## 2019-01-01 RX ADMIN — CEFTRIAXONE 1 G: 1 INJECTION, SOLUTION INTRAVENOUS at 04:07

## 2019-01-01 RX ADMIN — AZITHROMYCIN MONOHYDRATE 500 MG: 500 INJECTION, POWDER, LYOPHILIZED, FOR SOLUTION INTRAVENOUS at 08:10

## 2019-01-01 RX ADMIN — NITROGLYCERIN 2 INCH: 20 OINTMENT TOPICAL at 12:07

## 2019-01-01 RX ADMIN — ISOSORBIDE MONONITRATE 120 MG: 30 TABLET, EXTENDED RELEASE ORAL at 08:10

## 2019-01-01 RX ADMIN — AMIODARONE HYDROCHLORIDE 400 MG: 200 TABLET ORAL at 08:08

## 2019-01-01 RX ADMIN — CARVEDILOL 25 MG: 25 TABLET, FILM COATED ORAL at 08:08

## 2019-01-01 RX ADMIN — SPIRONOLACTONE 25 MG: 25 TABLET ORAL at 11:07

## 2019-01-01 RX ADMIN — ISOSORBIDE MONONITRATE 120 MG: 60 TABLET, EXTENDED RELEASE ORAL at 08:08

## 2019-01-01 RX ADMIN — SPIRONOLACTONE 25 MG: 25 TABLET ORAL at 08:08

## 2019-01-01 RX ADMIN — AMIODARONE HYDROCHLORIDE 400 MG: 200 TABLET ORAL at 09:08

## 2019-01-01 RX ADMIN — ENOXAPARIN SODIUM 80 MG: 100 INJECTION SUBCUTANEOUS at 11:07

## 2019-01-01 RX ADMIN — LISINOPRIL 40 MG: 20 TABLET ORAL at 08:08

## 2019-01-01 RX ADMIN — AMIODARONE HYDROCHLORIDE 150 MG: 1.5 INJECTION, SOLUTION INTRAVENOUS at 01:07

## 2019-01-01 RX ADMIN — LIDOCAINE HYDROCHLORIDE 40 MG: 20 INJECTION, SOLUTION INTRAVENOUS at 02:08

## 2019-01-01 RX ADMIN — FUROSEMIDE 40 MG: 40 TABLET ORAL at 06:08

## 2019-01-01 RX ADMIN — CEFTRIAXONE 1 G: 1 INJECTION, SOLUTION INTRAVENOUS at 08:10

## 2019-01-01 RX ADMIN — ASPIRIN 81 MG CHEWABLE TABLET 81 MG: 81 TABLET CHEWABLE at 08:08

## 2019-01-01 RX ADMIN — AMIODARONE HYDROCHLORIDE 400 MG: 200 TABLET ORAL at 08:10

## 2019-01-01 RX ADMIN — POLYETHYLENE GLYCOL 3350 17 G: 17 POWDER, FOR SOLUTION ORAL at 08:08

## 2019-01-01 RX ADMIN — ATORVASTATIN CALCIUM 80 MG: 40 TABLET, FILM COATED ORAL at 09:08

## 2019-01-01 RX ADMIN — CLOPIDOGREL 75 MG: 75 TABLET, FILM COATED ORAL at 08:07

## 2019-01-01 RX ADMIN — PROMETHAZINE HYDROCHLORIDE 12.5 MG: 25 INJECTION INTRAMUSCULAR; INTRAVENOUS at 12:08

## 2019-01-01 RX ADMIN — CARVEDILOL 25 MG: 25 TABLET, FILM COATED ORAL at 08:07

## 2019-01-01 RX ADMIN — FUROSEMIDE 40 MG: 40 TABLET ORAL at 08:10

## 2019-01-01 RX ADMIN — ATORVASTATIN CALCIUM 80 MG: 40 TABLET, FILM COATED ORAL at 08:08

## 2019-01-01 RX ADMIN — ISOSORBIDE MONONITRATE 60 MG: 60 TABLET, EXTENDED RELEASE ORAL at 09:08

## 2019-01-01 RX ADMIN — ATORVASTATIN CALCIUM 80 MG: 40 TABLET, FILM COATED ORAL at 08:10

## 2019-01-01 RX ADMIN — AZITHROMYCIN 500 MG: 250 TABLET, FILM COATED ORAL at 08:07

## 2019-01-01 RX ADMIN — AMOXICILLIN AND CLAVULANATE POTASSIUM 1 TABLET: 875; 125 TABLET, FILM COATED ORAL at 12:07

## 2019-01-01 RX ADMIN — DIAZEPAM 5 MG: 5 TABLET ORAL at 08:08

## 2019-01-01 RX ADMIN — ATORVASTATIN CALCIUM 80 MG: 40 TABLET, FILM COATED ORAL at 08:07

## 2019-01-01 RX ADMIN — POLYETHYLENE GLYCOL 3350 17 G: 17 POWDER, FOR SOLUTION ORAL at 09:08

## 2019-01-01 RX ADMIN — ASPIRIN 81 MG 81 MG: 81 TABLET ORAL at 08:10

## 2019-01-01 RX ADMIN — DOCUSATE SODIUM AND SENNOSIDES 1 TABLET: 8.6; 5 TABLET, FILM COATED ORAL at 09:07

## 2019-01-01 RX ADMIN — LIDOCAINE HYDROCHLORIDE 60 MG: 20 INJECTION, SOLUTION INTRAVENOUS at 02:08

## 2019-01-01 RX ADMIN — NITROGLYCERIN 0.4 MG: 0.4 TABLET SUBLINGUAL at 09:10

## 2019-01-01 RX ADMIN — LISINOPRIL 40 MG: 20 TABLET ORAL at 08:10

## 2019-01-01 RX ADMIN — MORPHINE SULFATE 5 MG: 10 INJECTION INTRAVENOUS at 10:10

## 2019-01-01 RX ADMIN — CEFAZOLIN 2 G: 1 INJECTION, POWDER, FOR SOLUTION INTRAMUSCULAR; INTRAVENOUS at 06:08

## 2019-01-01 RX ADMIN — NITROGLYCERIN 0.4 MG: 0.4 TABLET SUBLINGUAL at 12:07

## 2019-01-01 RX ADMIN — ONDANSETRON 8 MG: 2 INJECTION INTRAMUSCULAR; INTRAVENOUS at 10:07

## 2019-01-01 RX ADMIN — SIMETHICONE CHEW TAB 80 MG 160 MG: 80 TABLET ORAL at 11:07

## 2019-01-01 RX ADMIN — ENOXAPARIN SODIUM 80 MG: 100 INJECTION SUBCUTANEOUS at 11:08

## 2019-01-01 RX ADMIN — CLOPIDOGREL BISULFATE 75 MG: 75 TABLET ORAL at 07:07

## 2019-01-01 RX ADMIN — SPIRONOLACTONE 25 MG: 25 TABLET ORAL at 08:10

## 2019-01-01 RX ADMIN — ASPIRIN 81 MG CHEWABLE TABLET 81 MG: 81 TABLET CHEWABLE at 09:08

## 2019-01-01 RX ADMIN — AMIODARONE HYDROCHLORIDE 0.5 MG/MIN: 1.8 INJECTION, SOLUTION INTRAVENOUS at 07:07

## 2019-01-01 RX ADMIN — LISINOPRIL 40 MG: 20 TABLET ORAL at 10:08

## 2019-01-01 RX ADMIN — LISINOPRIL 40 MG: 20 TABLET ORAL at 09:08

## 2019-01-01 RX ADMIN — AMIODARONE HYDROCHLORIDE 400 MG: 200 TABLET ORAL at 09:10

## 2019-01-01 RX ADMIN — PROPOFOL 30 MG: 10 INJECTION, EMULSION INTRAVENOUS at 03:08

## 2019-01-01 RX ADMIN — CARVEDILOL 25 MG: 6.25 TABLET, FILM COATED ORAL at 08:10

## 2019-01-01 RX ADMIN — HYDROCODONE BITARTRATE AND ACETAMINOPHEN 1 TABLET: 5; 325 TABLET ORAL at 09:07

## 2019-01-01 RX ADMIN — NITROGLYCERIN 2 INCH: 20 OINTMENT TOPICAL at 05:07

## 2019-01-01 RX ADMIN — IPRATROPIUM BROMIDE AND ALBUTEROL SULFATE 3 ML: .5; 3 SOLUTION RESPIRATORY (INHALATION) at 10:10

## 2019-01-01 RX ADMIN — POTASSIUM CHLORIDE 40 MEQ: 1500 TABLET, EXTENDED RELEASE ORAL at 08:10

## 2019-01-01 RX ADMIN — ASPIRIN 81 MG 81 MG: 81 TABLET ORAL at 09:10

## 2019-01-01 RX ADMIN — REGADENOSON 0.4 MG: 0.08 INJECTION, SOLUTION INTRAVENOUS at 12:10

## 2019-01-01 RX ADMIN — CLOPIDOGREL BISULFATE 300 MG: 300 TABLET, FILM COATED ORAL at 07:10

## 2019-01-01 RX ADMIN — FUROSEMIDE 40 MG: 40 TABLET ORAL at 08:08

## 2019-01-01 RX ADMIN — AMIODARONE HYDROCHLORIDE 1 MG/MIN: 1.8 INJECTION, SOLUTION INTRAVENOUS at 01:07

## 2019-01-01 RX ADMIN — BENZONATATE 200 MG: 100 CAPSULE ORAL at 09:10

## 2019-01-01 RX ADMIN — AZITHROMYCIN 500 MG: 250 TABLET, FILM COATED ORAL at 04:07

## 2019-01-01 RX ADMIN — ENOXAPARIN SODIUM 80 MG: 100 INJECTION SUBCUTANEOUS at 05:07

## 2019-01-01 RX ADMIN — CARVEDILOL 25 MG: 6.25 TABLET, FILM COATED ORAL at 08:07

## 2019-01-01 RX ADMIN — RAMELTEON 8 MG: 8 TABLET, FILM COATED ORAL at 09:08

## 2019-01-01 RX ADMIN — CLOPIDOGREL BISULFATE 75 MG: 75 TABLET ORAL at 08:07

## 2019-01-01 RX ADMIN — CEFAZOLIN 2 G: 1 INJECTION, POWDER, FOR SOLUTION INTRAMUSCULAR; INTRAVENOUS at 11:08

## 2019-01-01 RX ADMIN — DIAZEPAM 5 MG: 5 TABLET ORAL at 04:08

## 2019-01-01 RX ADMIN — ACETAMINOPHEN 500 MG: 500 TABLET, FILM COATED ORAL at 04:08

## 2019-01-01 RX ADMIN — ASPIRIN 81 MG: 81 TABLET, COATED ORAL at 08:07

## 2019-01-01 RX ADMIN — ENOXAPARIN SODIUM 80 MG: 100 INJECTION SUBCUTANEOUS at 12:07

## 2019-01-01 RX ADMIN — ONDANSETRON 8 MG: 2 INJECTION INTRAMUSCULAR; INTRAVENOUS at 09:08

## 2019-01-01 RX ADMIN — ISOSORBIDE MONONITRATE 30 MG: 30 TABLET, EXTENDED RELEASE ORAL at 11:07

## 2019-01-01 RX ADMIN — SODIUM CHLORIDE: 0.9 INJECTION, SOLUTION INTRAVENOUS at 04:08

## 2019-01-01 RX ADMIN — HEPARIN SODIUM 14 UNITS/KG/HR: 10000 INJECTION, SOLUTION INTRAVENOUS at 04:10

## 2019-01-01 RX ADMIN — FUROSEMIDE 40 MG: 40 TABLET ORAL at 05:08

## 2019-01-01 RX ADMIN — NITROGLYCERIN 0.4 MG: 0.4 TABLET SUBLINGUAL at 11:08

## 2019-01-01 RX ADMIN — HEPARIN SODIUM 16 UNITS/KG/HR: 10000 INJECTION, SOLUTION INTRAVENOUS at 02:10

## 2019-01-01 RX ADMIN — NITROGLYCERIN 0.4 MG: 0.4 TABLET SUBLINGUAL at 05:10

## 2019-01-01 RX ADMIN — LISINOPRIL 40 MG: 20 TABLET ORAL at 09:10

## 2019-01-01 RX ADMIN — CEFAZOLIN 2 G: 330 INJECTION, POWDER, FOR SOLUTION INTRAMUSCULAR; INTRAVENOUS at 02:08

## 2019-01-01 RX ADMIN — IPRATROPIUM BROMIDE AND ALBUTEROL SULFATE 3 ML: .5; 3 SOLUTION RESPIRATORY (INHALATION) at 11:10

## 2019-01-01 RX ADMIN — SODIUM CHLORIDE: 0.9 INJECTION, SOLUTION INTRAVENOUS at 03:07

## 2019-01-01 RX ADMIN — NITROGLYCERIN 0.4 MG: 0.4 TABLET SUBLINGUAL at 12:08

## 2019-01-01 RX ADMIN — PROMETHAZINE HYDROCHLORIDE 12.5 MG: 25 INJECTION INTRAMUSCULAR; INTRAVENOUS at 11:07

## 2019-01-01 RX ADMIN — BENZONATATE 200 MG: 100 CAPSULE ORAL at 05:10

## 2019-01-01 RX ADMIN — FUROSEMIDE 20 MG: 10 INJECTION, SOLUTION INTRAMUSCULAR; INTRAVENOUS at 10:07

## 2019-01-01 RX ADMIN — POLYETHYLENE GLYCOL 3350 17 G: 17 POWDER, FOR SOLUTION ORAL at 04:07

## 2019-01-01 RX ADMIN — AMOXICILLIN AND CLAVULANATE POTASSIUM 1 TABLET: 875; 125 TABLET, FILM COATED ORAL at 08:07

## 2019-01-01 RX ADMIN — NITROGLYCERIN 0.4 MG: 0.4 TABLET SUBLINGUAL at 04:08

## 2019-01-01 RX ADMIN — FUROSEMIDE 40 MG: 10 INJECTION, SOLUTION INTRAVENOUS at 05:10

## 2019-01-01 RX ADMIN — NITROGLYCERIN 0.4 MG: 0.4 TABLET SUBLINGUAL at 10:08

## 2019-01-01 RX ADMIN — LISINOPRIL 40 MG: 20 TABLET ORAL at 08:07

## 2019-01-01 RX ADMIN — RAMELTEON 8 MG: 8 TABLET, FILM COATED ORAL at 09:07

## 2019-01-01 RX ADMIN — OXYCODONE HYDROCHLORIDE AND ACETAMINOPHEN 1 TABLET: 5; 325 TABLET ORAL at 05:08

## 2019-01-01 RX ADMIN — DOCUSATE SODIUM AND SENNOSIDES 1 TABLET: 8.6; 5 TABLET, FILM COATED ORAL at 02:07

## 2019-01-01 RX ADMIN — BENZONATATE 200 MG: 100 CAPSULE ORAL at 04:10

## 2019-01-01 RX ADMIN — LIDOCAINE HYDROCHLORIDE: 20 SOLUTION ORAL; TOPICAL at 08:07

## 2019-01-01 RX ADMIN — NITROGLYCERIN 15 MCG/MIN: 20 INJECTION INTRAVENOUS at 07:10

## 2019-01-01 RX ADMIN — ISOSORBIDE MONONITRATE 120 MG: 60 TABLET, EXTENDED RELEASE ORAL at 09:08

## 2019-01-01 RX ADMIN — CARVEDILOL 12.5 MG: 6.25 TABLET, FILM COATED ORAL at 08:07

## 2019-01-01 RX ADMIN — Medication 10 MG: at 03:08

## 2019-01-01 RX ADMIN — PROCHLORPERAZINE EDISYLATE 10 MG: 5 INJECTION INTRAMUSCULAR; INTRAVENOUS at 01:08

## 2019-01-01 RX ADMIN — ONDANSETRON 4 MG: 2 INJECTION INTRAMUSCULAR; INTRAVENOUS at 01:07

## 2019-01-01 RX ADMIN — MORPHINE SULFATE 5 MG: 10 INJECTION INTRAVENOUS at 05:10

## 2019-01-01 RX ADMIN — GLYCOPYRROLATE 0.2 MG: 0.2 INJECTION, SOLUTION INTRAMUSCULAR; INTRAVENOUS at 03:08

## 2019-01-01 RX ADMIN — ENOXAPARIN SODIUM 80 MG: 100 INJECTION SUBCUTANEOUS at 12:08

## 2019-01-01 RX ADMIN — DIAZEPAM 5 MG: 5 TABLET ORAL at 11:08

## 2019-01-01 RX ADMIN — FUROSEMIDE 40 MG: 40 TABLET ORAL at 05:10

## 2019-01-01 RX ADMIN — ONDANSETRON 4 MG: 2 INJECTION INTRAMUSCULAR; INTRAVENOUS at 02:07

## 2019-01-01 RX ADMIN — ATORVASTATIN CALCIUM 80 MG: 40 TABLET, FILM COATED ORAL at 09:10

## 2019-01-01 RX ADMIN — NITROGLYCERIN 0.4 MG: 0.4 TABLET SUBLINGUAL at 08:10

## 2019-01-01 RX ADMIN — ONDANSETRON 8 MG: 2 INJECTION INTRAMUSCULAR; INTRAVENOUS at 11:08

## 2019-01-01 RX ADMIN — HYDRALAZINE HYDROCHLORIDE 10 MG: 20 INJECTION INTRAMUSCULAR; INTRAVENOUS at 04:10

## 2019-01-01 RX ADMIN — POTASSIUM CHLORIDE 40 MEQ: 1500 TABLET, EXTENDED RELEASE ORAL at 08:07

## 2019-01-01 RX ADMIN — MORPHINE SULFATE 2 MG: 2 INJECTION, SOLUTION INTRAMUSCULAR; INTRAVENOUS at 11:08

## 2019-01-01 RX ADMIN — ACETAMINOPHEN 650 MG: 325 TABLET, FILM COATED ORAL at 12:07

## 2019-01-01 RX ADMIN — ATORVASTATIN CALCIUM 40 MG: 40 TABLET, FILM COATED ORAL at 08:07

## 2019-01-01 RX ADMIN — CLOPIDOGREL BISULFATE 75 MG: 75 TABLET ORAL at 08:10

## 2019-01-01 RX ADMIN — NITROGLYCERIN 0.4 MG: 0.4 TABLET SUBLINGUAL at 09:08

## 2019-01-01 RX ADMIN — CEFTRIAXONE 1 G: 1 INJECTION, SOLUTION INTRAVENOUS at 07:10

## 2019-01-01 RX ADMIN — FUROSEMIDE 40 MG: 10 INJECTION, SOLUTION INTRAVENOUS at 07:07

## 2019-01-01 RX ADMIN — SPIRONOLACTONE 25 MG: 25 TABLET ORAL at 09:10

## 2019-01-01 RX ADMIN — CLOPIDOGREL BISULFATE 600 MG: 300 TABLET, FILM COATED ORAL at 01:07

## 2019-01-01 RX ADMIN — ACETAMINOPHEN 650 MG: 325 TABLET, FILM COATED ORAL at 05:10

## 2019-01-01 RX ADMIN — PROPOFOL 25 MCG/KG/MIN: 10 INJECTION, EMULSION INTRAVENOUS at 02:08

## 2019-01-01 RX ADMIN — PROPOFOL 25 MG: 10 INJECTION, EMULSION INTRAVENOUS at 02:08

## 2019-01-01 RX ADMIN — CARVEDILOL 25 MG: 25 TABLET, FILM COATED ORAL at 12:07

## 2019-01-01 RX ADMIN — ALUMINUM HYDROXIDE, MAGNESIUM HYDROXIDE, AND SIMETHICONE 50 ML: 200; 200; 20 SUSPENSION ORAL at 05:08

## 2019-01-01 RX ADMIN — NITROGLYCERIN 0.4 MG: 0.4 TABLET SUBLINGUAL at 01:07

## 2019-01-01 RX ADMIN — SIMETHICONE CHEW TAB 80 MG 160 MG: 80 TABLET ORAL at 04:08

## 2019-01-01 RX ADMIN — FENTANYL CITRATE 25 MCG: 50 INJECTION, SOLUTION INTRAMUSCULAR; INTRAVENOUS at 02:08

## 2019-01-01 RX ADMIN — IOHEXOL 75 ML: 350 INJECTION, SOLUTION INTRAVENOUS at 02:07

## 2019-01-01 RX ADMIN — ACETAMINOPHEN 500 MG: 500 TABLET, FILM COATED ORAL at 08:08

## 2019-01-01 RX ADMIN — ASPIRIN 325 MG ORAL TABLET 325 MG: 325 PILL ORAL at 01:07

## 2019-01-01 RX ADMIN — HYDROCODONE BITARTRATE AND ACETAMINOPHEN 1 TABLET: 5; 325 TABLET ORAL at 07:07

## 2019-01-01 RX ADMIN — CLOPIDOGREL BISULFATE 75 MG: 75 TABLET ORAL at 09:10

## 2019-01-01 RX ADMIN — ACETAMINOPHEN 650 MG: 325 TABLET, FILM COATED ORAL at 03:10

## 2019-01-01 RX ADMIN — CEFAZOLIN 2 G: 1 INJECTION, POWDER, FOR SOLUTION INTRAMUSCULAR; INTRAVENOUS at 09:08

## 2019-01-01 RX ADMIN — ONDANSETRON 4 MG: 2 INJECTION INTRAMUSCULAR; INTRAVENOUS at 11:08

## 2019-07-16 NOTE — ED NOTES
Detail Level: Detailed Patient remains sleeping no difficulties noted.  Respiratory rate 20.  Palpated his pulse on his lt. Wrist trying not to disturb the patient.

## 2019-07-25 PROBLEM — I21.4 NSTEMI (NON-ST ELEVATED MYOCARDIAL INFARCTION): Status: ACTIVE | Noted: 2019-01-01

## 2019-07-25 NOTE — ASSESSMENT & PLAN NOTE
Current CP seems atypical/respirophasic.  CTA with concern for ?PNA  EKG abnl, but shonda so than prev admission for NSTEMI 3/2018  Elev trop, similar to prior presentation 3/2018 (cath at that time without culprit; noted made of collateralized  RCA without graft) and severe LV dysfxn with mod-sev MR.  Cont med rx CAD  Agree with enox  Resume ASA/Plavix/Statin/BBl/ACEi  Check echo  Diurese with IV lasix  Will consider cath pending clinical course and rx of PNA/CHF.  Keep NPO after MN for possible cath 7/26/19.

## 2019-07-25 NOTE — ED PROVIDER NOTES
Encounter Date: 7/25/2019    SCRIBE #1 NOTE: I, Calista Michel, am scribing for, and in the presence of,  Mu Duran MD. I have scribed the following portions of the note - Other sections scribed: HPI and ROS.       History     Chief Complaint   Patient presents with    GI Problem     N/V/D x 3 days reports history of Hepatitis and states he missed his last dose of medications    Weakness     CC: Emesis    HPI: This 54 y.o male, with a medical history of coronary artery disease, hyperlipidemia, hypertension, myocardial infarction, and stroke, presents to the ED c/o nausea, emesis and diarrhea for the last 3x days. Pt reports that he was diagnosed with Hepatitis earlier this year while incarcerated and was subsequently given his first dose of treatment. He states that he was informed that he would have to follow up in June to receive his next dose and notes that he attempted to while in [place] recently, but was unable to receive it. He reports that he returned to the area 2x weeks ago and began to feel as though he had a cold. Pt reports that the symptoms have worsened over the last 3x days as he has since begun to experience emesis (2x episodes today), diarrhea (2x episodes today), chest pain and shortness of breath (last night).The symptoms are acute, constant and severe (9/10). He adds that he turned purple last night and felt as though he was going to faint. Pt notes that he was later able to sleep, but awoke this morning with worsened symptoms.Pt denies fever or any other associated symptoms. No alleviating factors.    The history is provided by the patient. No  was used.     Review of patient's allergies indicates:  No Known Allergies  Past Medical History:   Diagnosis Date    Coronary artery disease     Hyperlipidemia     Hypertension     MI (myocardial infarction)     Mix2    Stroke      Past Surgical History:   Procedure Laterality Date    BYPASS GRAFT      TRACHEOSTOMY  CLOSURE       Family History   Problem Relation Age of Onset    Heart disease Mother     Hypertension Mother     Heart disease Father     Hypertension Father      Social History     Tobacco Use    Smoking status: Former Smoker     Types: Cigarettes    Smokeless tobacco: Never Used   Substance Use Topics    Alcohol use: No     Frequency: Never    Drug use: No     Review of Systems   Constitutional: Negative for chills and fever.   HENT: Negative for congestion, rhinorrhea and sore throat.    Eyes: Negative for visual disturbance.   Respiratory: Positive for shortness of breath. Negative for cough.    Cardiovascular: Positive for chest pain.   Gastrointestinal: Positive for diarrhea, nausea and vomiting. Negative for abdominal pain.   Genitourinary: Negative for dysuria.   Musculoskeletal: Negative for back pain.   Skin: Negative for rash.   Neurological: Negative for dizziness, weakness and headaches.       Physical Exam     Initial Vitals [07/25/19 1227]   BP Pulse Resp Temp SpO2   (!) 161/96 77 16 98 °F (36.7 °C) 96 %      MAP       --         Physical Exam    Nursing note and vitals reviewed.  HENT:   Head: Atraumatic.   Eyes: Conjunctivae and EOM are normal.   Neck: Normal range of motion.   Cardiovascular: Exam reveals no gallop and no friction rub.    No murmur heard.  Pulmonary/Chest: Breath sounds normal. No respiratory distress. He has no wheezes. He has no rales.   Abdominal: Soft. Bowel sounds are normal. He exhibits no distension. There is no tenderness.   Musculoskeletal: Normal range of motion. He exhibits no edema.   Neurological: He is alert and oriented to person, place, and time.   Skin: Skin is warm and dry.   Psychiatric: He has a normal mood and affect.         ED Course   Critical Care  Date/Time: 7/25/2019 4:45 PM  Performed by: Mu Duran MD  Authorized by: Mu Duran MD   Direct patient critical care time: 24 minutes  Ordering / reviewing critical care time: 10  minutes  Documentation critical care time: 10 minutes  Consulting other physicians critical care time: 5 minutes  Total critical care time (exclusive of procedural time) : 49 minutes  Critical care was necessary to treat or prevent imminent or life-threatening deterioration of the following conditions: circulatory failure, cardiac failure, shock, respiratory failure and sepsis.  Critical care was time spent personally by me on the following activities: development of treatment plan with patient or surrogate, discussions with consultants, evaluation of patient's response to treatment, examination of patient, ordering and performing treatments and interventions, obtaining history from patient or surrogate, ordering and review of laboratory studies, ordering and review of radiographic studies, re-evaluation of patient's condition, pulse oximetry and review of old charts.        Labs Reviewed   CBC W/ AUTO DIFFERENTIAL - Abnormal; Notable for the following components:       Result Value    RBC 4.20 (*)     Hemoglobin 12.1 (*)     Hematocrit 37.1 (*)     RDW 15.8 (*)     Mono% 19.2 (*)     All other components within normal limits   COMPREHENSIVE METABOLIC PANEL - Abnormal; Notable for the following components:    CO2 22 (*)     Creatinine 1.5 (*)     Albumin 3.4 (*)     Total Bilirubin 1.7 (*)     AST 43 (*)     eGFR if  59 (*)     eGFR if non  51 (*)     All other components within normal limits   TROPONIN I - Abnormal; Notable for the following components:    Troponin I 1.684 (*)     All other components within normal limits   CULTURE, BLOOD   CULTURE, BLOOD   LIPASE   MAGNESIUM   LACTIC ACID, PLASMA   DRUG SCREEN PANEL, URINE EMERGENCY   CK-MB   CK          Imaging Results           CTA Chest Non-Coronary (PE Study) (Final result)  Result time 07/25/19 15:13:18    Final result by Alonso Palacios Jr., MD (07/25/19 15:13:18)                 Impression:      There are patchy areas of  parenchymal consolidation bilaterally right slightly more than left.  This most likely represents bilateral pneumonic infiltrates however aspiration, blood products and least likely alveolar edema need to be considered.  Bilateral pleural effusions.  Correlation with clinical findings would be helpful.    Enlarged right hilar node and scattered mediastinal nodes likely related to the pulmonary parenchymal process.    No findings to indicate pulmonary embolus.    Reflux of contrast into the IVC and hepatic veins suggest elevated right heart pressure.    This report was flagged in Epic as abnormal.      Electronically signed by: Alonso Palacios MD  Date:    07/25/2019  Time:    15:13             Narrative:    EXAMINATION:  CTA CHEST NON CORONARY    CLINICAL HISTORY:  r/o PE and eval cxr abnormalities;    TECHNIQUE:  Low dose axial images, sagittal and coronal reformations were obtained from the thoracic inlet to the lung bases following the IV administration of 75 mL of Omnipaque 350.  Contrast timing was optimized to evaluate the pulmonary arteries.  MIP images were performed.    COMPARISON:  Chest x-ray July 25, 2019.    FINDINGS:  There is good opacification of the pulmonary arteries.  No intraluminal filling defects identified to indicate a pulmonary embolus.    Elsewhere visualized thyroid is normal.  Some nonenlarged mediastinal nodes are present.  1.7 cm right hilar node.  There are small bilateral pleural effusions.  There is also reflux of contrast into the hepatic veins suggesting some elevated right heart pressure.  No significant pericardial fluid.    Evaluation of the lungs demonstrate some emphysematous changes.  There are patchy areas of parenchymal opacification bilaterally.  This involves the right upper, right middle and right lower lobes.  There is also some patchy parenchymal consolidation in the left upper lobe.  Some patchy opacities at the left lung base as well.  There is some respiratory motion  artifact on the images.    Images of the upper abdomen demonstrate the reflux of contrast into the hepatic veins.  Visualized spleen is normal.  Adrenal glands incompletely visualized.    Bone windows demonstrate postoperative change the cervical spine.  No convincing lytic or blastic lesions.                               X-Ray Chest AP Portable (Final result)  Result time 07/25/19 13:33:02    Final result by Emily Nicholson MD (07/25/19 13:33:02)                 Impression:      Abnormal chest radiograph.      Electronically signed by: Emily Nicholson MD  Date:    07/25/2019  Time:    13:33             Narrative:    EXAMINATION:  XR CHEST AP PORTABLE    CLINICAL HISTORY:  chest pain;    TECHNIQUE:  Single frontal view of the chest was performed.    COMPARISON:  03/25/2018.    FINDINGS:  Sternal sutures are intact and aligned following remote CABG.  Anterior fusion hardware projects over the cervicothoracic junction of the spine.    Mediastinal structures are midline.  Cardiac silhouette is moderately enlarged similar to 03/25/2018 suggesting ventricular dilatation, pericardial fluid or both.    There is a new ovoid soft tissue opacity with ill-defined margins, 4 x 3 cm, projecting over the right upper lung zone at the level of the 1st and 2nd anterior intercostal spaces overlying the 4th through 6th intercostal spaces.  There is a 2nd region of increased attenuation with ill-defined margins, 3 cm in long axis, in the medial aspect of the right lower lung zone obscuring the dome of the right hemidiaphragm.  More modest ill-defined patchy opacities in subsegmental distribution are present elsewhere in the right lung and also in the left mid and upper lung zones.  These findings are new since 03/25/2018, 16 months earlier.  Differential diagnosis includes multifocal pneumonia, aspiration or other noninfectious inflammation and neoplasm.    -If the patient has clinical evidence of pneumonia I recommend institution  of appropriate clinical therapy and repeat chest radiograph after an interval of 4 weeks; radiographic resolution of pneumonia lags behind clinical response.    -If the patient does not have convincing clinical evidence of pneumonia I recommend chest CT, preferably with intravenous contrast medium.    I detect no pleural fluid, pneumothorax, pneumomediastinum, pneumoperitoneum or significant osseous abnormality.                                 Medical Decision Making:   Initial Assessment:   54-year-old male with a history of coronary artery disease status post bypass last year presents with 2-3 days of vomiting and diarrhea with associated intermittent substernal chest pain. On exam he has no reproducible tenderness. His vital signs are unremarkable. EKG reviewed interpreted myself shows no evidence of acute ischemia however there is PVCs.  On his rhythm strip obtained earlier today, there were some Q are asked complex is with ST elevation but these are not sustained. Discussed with cardiology who viewed 3 lead and these are just PVCs. No need for emergent intervention.  Chest x-ray reviewed interpreted myself shows no evidence of pneumothorax, pulmonary edema.  Radiology reports that there are some subtle opacities concerning for multifocal pneumonia.  CTA of the chest is consistent with this.  Though there is evidence of multifocal pneumonia, I discussed with radiology it does not have the classic appearance of septic emboli.  Nonetheless, blood cultures drawn and antibiotics ordered.  Discussed case with Cardiology who will review chart and consult on the patient.  Will give the patient a dose of Lovenox.                        Clinical Impression:       ICD-10-CM ICD-9-CM   1. NSTEMI (non-ST elevated myocardial infarction) I21.4 410.70   2. Pneumonia of both lungs due to infectious organism, unspecified part of lung J18.9 483.8            I, Mu Duran MD, personally performed the services described in this  documentation. All medical record entries made by the scribe were at my direction and in my presence.  I have reviewed the chart and agree that the record reflects my personal performance and is accurate and complete.                    Mu Duran MD  07/25/19 [id number]

## 2019-07-25 NOTE — Clinical Note
Catheter is inserted into the LIMA to LAD. Angiography performed of the left coronary arteries in multiple views.

## 2019-07-25 NOTE — NURSING TRANSFER
Nursing Transfer Note      7/25/2019     Transfer From:  ED    Transfer via wheelchair    Transfer with cardiac monitoring    Transported by transport    Medicines sent: no    Chart send with patient: Yes    Notified: spouse    Patient reassessed at: 7/25/2019 6:30p    Upon arrival to floor: cardiac monitor applied, patient oriented to room, call bell in reach and bed in lowest position

## 2019-07-25 NOTE — ASSESSMENT & PLAN NOTE
Known ICM  Also with significant MR on prev echo  Repeat echo  Will consider cath pending clinical course and rx of CHF/PNA

## 2019-07-25 NOTE — CONSULTS
[facility]  Cardiology  Consult Note    Patient Name: Abdoul Ochoa  MRN: [id number]  Admission Date: 7/25/2019  Hospital Length of Stay: 0 days  Code Status: Full Code   Attending Provider: Segundo Ovalle MD   Consulting Provider: Aftab Lemus MD  Primary Care Physician: Primary Doctor No  Principal Problem:NSTEMI (non-ST elevated myocardial infarction)    Patient information was obtained from patient and ER records.     Inpatient consult to Cardiology  Consult performed by: Aftab Lemus MD  Consult ordered by: Mu Duran MD  Reason for consult: NSTEMI        Subjective:     Chief Complaint:  SOB/CP     HPI:   54 y.o male, with a medical history of coronary artery disease, hyperlipidemia, hypertension, myocardial infarction, and stroke, presents to the ED c/o nausea, emesis and diarrhea for the last 3x days. Pt reports that he was diagnosed with Hepatitis earlier this year while incarcerated and was subsequently given his first dose of treatment. He states that he was informed that he would have to follow up in June to receive his next dose and notes that he attempted to while in [place] recently, but was unable to receive it. He reports that he returned to the area 2x weeks ago and began to feel as though he had a cold. Pt reports that the symptoms have worsened over the last 3x days as he has since begun to experience emesis (2x episodes today), diarrhea (2x episodes today), chest pain and shortness of breath (last night).The symptoms are acute, constant and severe (9/10). He adds that he turned purple last night and felt as though he was going to faint. Pt notes that he was later able to sleep, but awoke this morning with worsened symptoms.Pt denies fever or any other associated symptoms. No alleviating factors.    The patient was last seen by Dr. Laureano an inpatient in March 2018.  He had a mildly elevated troponin at that time underwent catheterization and without  culprit vessel intervention.    He now presents with multiple complaints, predominantly of shortness of breath and cough.  He also describes respiratory phasic chest pain.  He does appear to be volume overloaded in his describing some orthopnea.  He has had no PND.  He denies any medication non adherence.  His troponin is mildly elevated, similar to his presentation back in March 2018.  He does have an abnormal EKG, although this is actually improved versus his most recent prior tracing in our system.  His previous echocardiogram notes severe LV dysfunction.    Past Medical History:   Diagnosis Date    Coronary artery disease     Hyperlipidemia     Hypertension     MI (myocardial infarction)     2013, 2015, 2016, 2018 (total of 4 stents), triple CABG January 2018    Stroke 09/2014       Past Surgical History:   Procedure Laterality Date    BYPASS GRAFT      CARDIAC SURGERY  01/2018    three vessel CABG    CORONARY ANGIOPLASTY WITH STENT PLACEMENT      4 stents    TRACHEOSTOMY CLOSURE  2014       Review of patient's allergies indicates:  No Known Allergies    No current facility-administered medications on file prior to encounter.      Current Outpatient Medications on File Prior to Encounter   Medication Sig    aspirin 81 MG Chew Take 81 mg by mouth once daily.    atorvastatin (LIPITOR) 10 MG tablet Take by mouth once daily.    carvedilol (COREG) 12.5 MG tablet Take 1 tablet (12.5 mg total) by mouth 2 (two) times daily.    lisinopril 10 MG tablet Take 10 mg by mouth once daily.    clopidogrel (PLAVIX) 75 mg tablet Take 1 tablet (75 mg total) by mouth once daily.    nitroGLYCERIN (NITROSTAT) 0.4 MG SL tablet Place 0.4 mg under the tongue every 5 (five) minutes as needed for Chest pain.     Family History     Problem Relation (Age of Onset)    Heart disease Mother, Father    Hypertension Mother, Father        Tobacco Use    Smoking status: Former Smoker     Types: Cigarettes     Last attempt to quit:  "2014     Years since quittin.1    Smokeless tobacco: Never Used   Substance and Sexual Activity    Alcohol use: Yes     Frequency: Never     Comment: beer on the weekends "barely"    Drug use: No    Sexual activity: Yes     Partners: Female     Review of Systems   Constitution: Negative for chills, diaphoresis, fever and malaise/fatigue.   HENT: Negative for nosebleeds.    Eyes: Negative for blurred vision and double vision.   Cardiovascular: Positive for chest pain (respirophasic), dyspnea on exertion and orthopnea. Negative for claudication, cyanosis, leg swelling, palpitations, paroxysmal nocturnal dyspnea and syncope.   Respiratory: Positive for cough and shortness of breath. Negative for wheezing.    Skin: Negative for dry skin and poor wound healing.   Musculoskeletal: Negative for back pain, joint swelling and myalgias.   Gastrointestinal: Negative for abdominal pain, nausea and vomiting.   Genitourinary: Negative for hematuria.   Neurological: Negative for dizziness, headaches, numbness, seizures and weakness.   Psychiatric/Behavioral: Negative for altered mental status and depression.     Objective:     Vital Signs (Most Recent):  Temp: 97.8 °F (36.6 °C) (19)  Pulse: 61 (19)  Resp: 18 (19)  BP: (!) 192/96 (19)  SpO2: 95 % (19) Vital Signs (24h Range):  Temp:  [97.8 °F (36.6 °C)-98 °F (36.7 °C)] 97.8 °F (36.6 °C)  Pulse:  [61-94] 61  Resp:  [16-18] 18  SpO2:  [91 %-98 %] 95 %  BP: (136-199)/() 192/96     Weight: 76.2 kg (168 lb)  Body mass index is 27.12 kg/m².    SpO2: 95 %  O2 Device (Oxygen Therapy): room air      Intake/Output Summary (Last 24 hours) at 2019  Last data filed at 2019  Gross per 24 hour   Intake 50 ml   Output --   Net 50 ml       Lines/Drains/Airways     Peripheral Intravenous Line                 Peripheral IV - Single Lumen 19 18 G Left Antecubital less than 1 day                Physical " Exam   Constitutional: He is oriented to person, place, and time. He appears well-developed and well-nourished.   HENT:   Head: Normocephalic and atraumatic.   Eyes: Pupils are equal, round, and reactive to light. Conjunctivae and EOM are normal. No scleral icterus.   Neck: Normal range of motion. Neck supple. JVD present. Carotid bruit is not present. No tracheal deviation present. No thyromegaly present.   Cardiovascular: Normal rate, regular rhythm, S1 normal and S2 normal. Exam reveals no gallop and no friction rub.   No murmur heard.  Pulmonary/Chest: Effort normal. No respiratory distress. He has no wheezes. He has rales. He exhibits no tenderness.   Abdominal: Soft. He exhibits no distension.   Musculoskeletal: Normal range of motion. He exhibits no edema.   Neurological: He is alert and oriented to person, place, and time. He has normal strength. No cranial nerve deficit.   Skin: Skin is warm and dry. No rash noted.   Psychiatric: He has a normal mood and affect. His behavior is normal.       Current Medications:   [START ON 7/26/2019] aspirin  325 mg Oral Daily    azithromycin  500 mg Oral Daily    cefTRIAXone (ROCEPHIN) IVPB  1 g Intravenous Q24H    enoxaparin  1 mg/kg Subcutaneous Q12H       sodium chloride 0.9%    Laboratory (all labs reviewed):  CBC:  Recent Labs   Lab 01/17/18  0636 01/18/18  0254 03/25/18  1752 07/25/19  1319   WBC 5.95 8.33 7.34 5.15   Hemoglobin 10.3 L 11.9 L 11.8 L 12.1 L   Hematocrit 32.4 L 36.3 L 36.3 L 37.1 L   Platelets 231 292 209 272       CHEMISTRIES:  Recent Labs   Lab 01/17/18  0636 01/18/18  0254 01/19/18  0524 03/25/18  1752 07/25/19  1319   Glucose 95 111 H  --  90 89   Sodium 139 140  --  139 137   Potassium 4.1 3.4 L  --  3.2 L 4.3   BUN, Bld 23 H 22 H  --  15 20   Creatinine 1.4 1.3  --  1.5 H 1.5 H   eGFR if  >60 >60  --  60 59 A   eGFR if non  57 A >60  --  52 A 51 A   Calcium 9.2 9.5  --  9.6 8.8   Magnesium  --  1.8 1.7  --   1.9       CARDIAC BIOMARKERS:  Recent Labs   Lab 01/17/18  1757 01/18/18  0008 03/25/18  1752 03/25/18  2225 07/25/19  1319   CPK  --  84  --   --  235 H  235 H   CPK MB  --  2.1  --   --  3.2   Troponin I 1.430 H 1.291 H 0.961 H 0.967 H 1.684 H       COAGS:  Recent Labs   Lab 01/17/18  0636   INR 1.1       LIPIDS/LFTS:  Recent Labs   Lab 01/17/18  0636 01/18/18  0254 03/25/18  1752 07/25/19  1319   Cholesterol  --  156  --   --    Triglycerides  --  81  --   --    HDL  --  38 L  --   --    LDL Cholesterol  --  101.8  --   --    Non-HDL Cholesterol  --  118  --   --    AST 43 H 29 20 43 H   ALT 37 33 32 34       BNP:  Recent Labs   Lab 01/17/18 0636 03/25/18  1752   BNP 1,849 H 1,039 H       TSH:        Free T4:        Diagnostic Results:  ECG (personally reviewed and interpreted tracing(s)):  7/25/19 1250 SR 90, PVC, lat ST abnl ?isch, improved vs 3/25/18    Chest X-Ray (personally reviewed and interpreted image(s)): 7/25/19 CHF, ?R mid lung zone infil, prior sternotomy    CTA Chest 7/25/19  -There are patchy areas of parenchymal consolidation bilaterally right slightly more than left.  This most likely represents bilateral pneumonic infiltrates however aspiration, blood products and least likely alveolar edema need to be considered.  Bilateral pleural effusions.  Correlation with clinical findings would be helpful.  -Enlarged right hilar node and scattered mediastinal nodes likely related to the pulmonary parenchymal process.  -No findings to indicate pulmonary embolus.  -Reflux of contrast into the IVC and hepatic veins suggest elevated right heart pressure.    Echo: 1/17/18    1 - Severely depressed left ventricular systolic function (EF 20-25%).     2 - Eccentric hypertrophy.     3 - Severely depressed right ventricular systolic function .     4 - Pulmonary hypertension. The estimated PA systolic pressure is 45 mmHg.     5 - Moderate to severe mitral regurgitation.     6 - Intermediate central venous  pressure.     Cath: 1/18/18 (images personally reviewed and interpreted)  B. Summary/Post-Operative Diagnosis    Three vessel coronary artery disease.    No obvious culprit for non-STEMI identified.    Patent grafts as above.    Ostial right SFA disease noted.    Low right and left filling pressures.  D. Hemodynamic Results  AIR REST:  PW:  (1)  PA: 34  CO_THERM: 5.33  RV: 35  RA:  (2)  LVEDP: 8   E. Angiographic Results  Diagnostic:        Patient has a right dominant coronary artery.      - Left Main Coronary Artery:             The LM is normal. There is CASSIA 3 flow.     - Left Anterior Descending Artery:             The LAD is diffusely diseased (75). There is CASSIA 3 flow.     - Left Circumflex Artery:             The proximal LCX has a 80% stenosis. There is CASSIA 3 flow.     - Right Coronary Artery:             The mid RCA is occluded (100). There is CASSIA 0 flow. Distal stent noted to be occluded.  There are collaterals from the septal branches of the LAD supplying distal PDA which is small vessel but collaterals appear to take off before the   stenosis in the LAD.     - Aortic Root:             The Aortic Root is normal. There is CASSIA 3 flow.     - BARAJAS To LAD:             The LIMA to LAD is patent. There is CASSIA 3 flow.     - SVG To D1:             The SVG to D1 is patent. There is CASSIA 3 flow.     - SVG To OM2:             The SVG to OM2 is patent. There is CASSIA 3 flow.     - Common Femoral Artery:             The right CFA is normal. Ostial stenosis of the right SFA noted      Assessment and Plan:     * NSTEMI (non-ST elevated myocardial infarction)  Current CP seems atypical/respirophasic.  CTA with concern for ?PNA  EKG abnl, but shonda so than prev admission for NSTEMI 3/2018  Elev trop, similar to prior presentation 3/2018 (cath at that time without culprit; noted made of collateralized  RCA without graft) and severe LV dysfxn with mod-sev MR.  Cont med rx CAD  Agree with enox  Resume  ASA/Plavix/Statin/BBl/ACEi  Check echo  Diurese with IV lasix  Will consider cath pending clinical course and rx of PNA/CHF.  Keep NPO after MN for possible cath 7/26/19.    Coronary artery disease  Known ICM  Also with significant MR on prev echo  Repeat echo  Will consider cath pending clinical course and rx of CHF/PNA    Mixed hyperlipidemia  Cont statin    Hypertension  Cont med rx        VTE Risk Mitigation (From admission, onward)        Ordered     IP VTE HIGH RISK PATIENT  Once      07/25/19 1832     Place CHAPITO hose  Until discontinued      07/25/19 1832     Place sequential compression device  Until discontinued      07/25/19 1832     enoxaparin injection 80 mg  Every 12 hours (non-standard times)      07/25/19 1647          Thank you for your consult. I will follow-up with patient. Please contact us if you have any additional questions.    Aftab Lemus MD  Cardiology   [facility]

## 2019-07-25 NOTE — ED TRIAGE NOTES
Pt presents to ED via EMS by self; pt c/o abdominal pain and N/V/D x 3 days and he says it got worse last night; pt is feeling SOB and says he has a cough and has been coughing up yellow-green mucus; pt denies CP; + weakness and dizziness also; pt is AAOx4; VSS; will continue to monitor

## 2019-07-25 NOTE — Clinical Note
Catheter is inserted into the ostium   left main. Hemodynamics performed. Angiography performed of the left coronary arteries in multiple views.

## 2019-07-25 NOTE — Clinical Note
63 ml injected throughout the case. 137 mL total wasted during the case. 200 mL total used in the case.

## 2019-07-25 NOTE — SUBJECTIVE & OBJECTIVE
"Past Medical History:   Diagnosis Date    Coronary artery disease     Hyperlipidemia     Hypertension     MI (myocardial infarction)     , , ,  (total of 4 stents), triple CABG 2018    Stroke 2014       Past Surgical History:   Procedure Laterality Date    BYPASS GRAFT      CARDIAC SURGERY  2018    three vessel CABG    CORONARY ANGIOPLASTY WITH STENT PLACEMENT      4 stents    TRACHEOSTOMY CLOSURE         Review of patient's allergies indicates:  No Known Allergies    No current facility-administered medications on file prior to encounter.      Current Outpatient Medications on File Prior to Encounter   Medication Sig    aspirin 81 MG Chew Take 81 mg by mouth once daily.    atorvastatin (LIPITOR) 10 MG tablet Take by mouth once daily.    carvedilol (COREG) 12.5 MG tablet Take 1 tablet (12.5 mg total) by mouth 2 (two) times daily.    lisinopril 10 MG tablet Take 10 mg by mouth once daily.    clopidogrel (PLAVIX) 75 mg tablet Take 1 tablet (75 mg total) by mouth once daily.    nitroGLYCERIN (NITROSTAT) 0.4 MG SL tablet Place 0.4 mg under the tongue every 5 (five) minutes as needed for Chest pain.     Family History     Problem Relation (Age of Onset)    Heart disease Mother, Father    Hypertension Mother, Father        Tobacco Use    Smoking status: Former Smoker     Types: Cigarettes     Last attempt to quit: 2014     Years since quittin.1    Smokeless tobacco: Never Used   Substance and Sexual Activity    Alcohol use: Yes     Frequency: Never     Comment: beer on the weekends "barely"    Drug use: No    Sexual activity: Yes     Partners: Female     Review of Systems   Constitution: Negative for chills, diaphoresis, fever and malaise/fatigue.   HENT: Negative for nosebleeds.    Eyes: Negative for blurred vision and double vision.   Cardiovascular: Positive for chest pain (respirophasic), dyspnea on exertion and orthopnea. Negative for claudication, " cyanosis, leg swelling, palpitations, paroxysmal nocturnal dyspnea and syncope.   Respiratory: Positive for cough and shortness of breath. Negative for wheezing.    Skin: Negative for dry skin and poor wound healing.   Musculoskeletal: Negative for back pain, joint swelling and myalgias.   Gastrointestinal: Negative for abdominal pain, nausea and vomiting.   Genitourinary: Negative for hematuria.   Neurological: Negative for dizziness, headaches, numbness, seizures and weakness.   Psychiatric/Behavioral: Negative for altered mental status and depression.     Objective:     Vital Signs (Most Recent):  Temp: 97.8 °F (36.6 °C) (07/25/19 1833)  Pulse: 61 (07/25/19 1833)  Resp: 18 (07/25/19 1833)  BP: (!) 192/96 (07/25/19 1833)  SpO2: 95 % (07/25/19 1747) Vital Signs (24h Range):  Temp:  [97.8 °F (36.6 °C)-98 °F (36.7 °C)] 97.8 °F (36.6 °C)  Pulse:  [61-94] 61  Resp:  [16-18] 18  SpO2:  [91 %-98 %] 95 %  BP: (136-199)/() 192/96     Weight: 76.2 kg (168 lb)  Body mass index is 27.12 kg/m².    SpO2: 95 %  O2 Device (Oxygen Therapy): room air      Intake/Output Summary (Last 24 hours) at 7/25/2019 1836  Last data filed at 7/25/2019 1740  Gross per 24 hour   Intake 50 ml   Output --   Net 50 ml       Lines/Drains/Airways     Peripheral Intravenous Line                 Peripheral IV - Single Lumen 07/25/19 18 G Left Antecubital less than 1 day                Physical Exam   Constitutional: He is oriented to person, place, and time. He appears well-developed and well-nourished.   HENT:   Head: Normocephalic and atraumatic.   Eyes: Pupils are equal, round, and reactive to light. Conjunctivae and EOM are normal. No scleral icterus.   Neck: Normal range of motion. Neck supple. JVD present. Carotid bruit is not present. No tracheal deviation present. No thyromegaly present.   Cardiovascular: Normal rate, regular rhythm, S1 normal and S2 normal. Exam reveals no gallop and no friction rub.   No murmur heard.  Pulmonary/Chest:  Effort normal. No respiratory distress. He has no wheezes. He has rales. He exhibits no tenderness.   Abdominal: Soft. He exhibits no distension.   Musculoskeletal: Normal range of motion. He exhibits no edema.   Neurological: He is alert and oriented to person, place, and time. He has normal strength. No cranial nerve deficit.   Skin: Skin is warm and dry. No rash noted.   Psychiatric: He has a normal mood and affect. His behavior is normal.       Current Medications:   [START ON 7/26/2019] aspirin  325 mg Oral Daily    azithromycin  500 mg Oral Daily    cefTRIAXone (ROCEPHIN) IVPB  1 g Intravenous Q24H    enoxaparin  1 mg/kg Subcutaneous Q12H       sodium chloride 0.9%    Laboratory (all labs reviewed):  CBC:  Recent Labs   Lab 01/17/18  0636 01/18/18  0254 03/25/18 1752 07/25/19  1319   WBC 5.95 8.33 7.34 5.15   Hemoglobin 10.3 L 11.9 L 11.8 L 12.1 L   Hematocrit 32.4 L 36.3 L 36.3 L 37.1 L   Platelets 231 292 209 272       CHEMISTRIES:  Recent Labs   Lab 01/17/18  0636 01/18/18  0254 01/19/18  0524 03/25/18 1752 07/25/19  1319   Glucose 95 111 H  --  90 89   Sodium 139 140  --  139 137   Potassium 4.1 3.4 L  --  3.2 L 4.3   BUN, Bld 23 H 22 H  --  15 20   Creatinine 1.4 1.3  --  1.5 H 1.5 H   eGFR if  >60 >60  --  60 59 A   eGFR if non  57 A >60  --  52 A 51 A   Calcium 9.2 9.5  --  9.6 8.8   Magnesium  --  1.8 1.7  --  1.9       CARDIAC BIOMARKERS:  Recent Labs   Lab 01/17/18  1757 01/18/18  0008 03/25/18  1752 03/25/18  2225 07/25/19  1319   CPK  --  84  --   --  235 H  235 H   CPK MB  --  2.1  --   --  3.2   Troponin I 1.430 H 1.291 H 0.961 H 0.967 H 1.684 H       COAGS:  Recent Labs   Lab 01/17/18  0636   INR 1.1       LIPIDS/LFTS:  Recent Labs   Lab 01/17/18  0636 01/18/18  0254 03/25/18  1752 07/25/19  1319   Cholesterol  --  156  --   --    Triglycerides  --  81  --   --    HDL  --  38 L  --   --    LDL Cholesterol  --  101.8  --   --    Non-HDL Cholesterol  --  118   --   --    AST 43 H 29 20 43 H   ALT 37 33 32 34       BNP:  Recent Labs   Lab 01/17/18  0636 03/25/18  1752   BNP 1,849 H 1,039 H       TSH:        Free T4:        Diagnostic Results:  ECG (personally reviewed and interpreted tracing(s)):  7/25/19 1250 SR 90, PVC, lat ST abnl ?isch, improved vs 3/25/18    Chest X-Ray (personally reviewed and interpreted image(s)): 7/25/19 CHF, ?R mid lung zone infil, prior sternotomy    CTA Chest 7/25/19  -There are patchy areas of parenchymal consolidation bilaterally right slightly more than left.  This most likely represents bilateral pneumonic infiltrates however aspiration, blood products and least likely alveolar edema need to be considered.  Bilateral pleural effusions.  Correlation with clinical findings would be helpful.  -Enlarged right hilar node and scattered mediastinal nodes likely related to the pulmonary parenchymal process.  -No findings to indicate pulmonary embolus.  -Reflux of contrast into the IVC and hepatic veins suggest elevated right heart pressure.    Echo: 1/17/18    1 - Severely depressed left ventricular systolic function (EF 20-25%).     2 - Eccentric hypertrophy.     3 - Severely depressed right ventricular systolic function .     4 - Pulmonary hypertension. The estimated PA systolic pressure is 45 mmHg.     5 - Moderate to severe mitral regurgitation.     6 - Intermediate central venous pressure.     Cath: 1/18/18 (images personally reviewed and interpreted)  B. Summary/Post-Operative Diagnosis    Three vessel coronary artery disease.    No obvious culprit for non-STEMI identified.    Patent grafts as above.    Ostial right SFA disease noted.    Low right and left filling pressures.  D. Hemodynamic Results  AIR REST:  PW:  (1)  PA: 34  CO_THERM: 5.33  RV: 35  RA:  (2)  LVEDP: 8   E. Angiographic Results  Diagnostic:        Patient has a right dominant coronary artery.      - Left Main Coronary Artery:             The LM is normal. There is CASSIA 3  flow.     - Left Anterior Descending Artery:             The LAD is diffusely diseased (75). There is CASSIA 3 flow.     - Left Circumflex Artery:             The proximal LCX has a 80% stenosis. There is CASSIA 3 flow.     - Right Coronary Artery:             The mid RCA is occluded (100). There is CASSIA 0 flow. Distal stent noted to be occluded.  There are collaterals from the septal branches of the LAD supplying distal PDA which is small vessel but collaterals appear to take off before the   stenosis in the LAD.     - Aortic Root:             The Aortic Root is normal. There is CASSIA 3 flow.     - BARAJAS To LAD:             The LIMA to LAD is patent. There is CASSIA 3 flow.     - SVG To D1:             The SVG to D1 is patent. There is CASSIA 3 flow.     - SVG To OM2:             The SVG to OM2 is patent. There is CASSIA 3 flow.     - Common Femoral Artery:             The right CFA is normal. Ostial stenosis of the right SFA noted

## 2019-07-25 NOTE — HPI
54 y.o male, with a medical history of coronary artery disease, hyperlipidemia, hypertension, myocardial infarction, and stroke, presents to the ED c/o nausea, emesis and diarrhea for the last 3x days. Pt reports that he was diagnosed with Hepatitis earlier this year while incarcerated and was subsequently given his first dose of treatment. He states that he was informed that he would have to follow up in June to receive his next dose and notes that he attempted to while in [place] recently, but was unable to receive it. He reports that he returned to the area 2x weeks ago and began to feel as though he had a cold. Pt reports that the symptoms have worsened over the last 3x days as he has since begun to experience emesis (2x episodes today), diarrhea (2x episodes today), chest pain and shortness of breath (last night).The symptoms are acute, constant and severe (9/10). He adds that he turned purple last night and felt as though he was going to faint. Pt notes that he was later able to sleep, but awoke this morning with worsened symptoms.Pt denies fever or any other associated symptoms. No alleviating factors.    The patient was last seen by Dr. Laureano an inpatient in March 2018.  He had a mildly elevated troponin at that time underwent catheterization and without culprit vessel intervention.    He now presents with multiple complaints, predominantly of shortness of breath and cough.  He also describes respiratory phasic chest pain.  He does appear to be volume overloaded in his describing some orthopnea.  He has had no PND.  He denies any medication non adherence.  His troponin is mildly elevated, similar to his presentation back in March 2018.  He does have an abnormal EKG, although this is actually improved versus his most recent prior tracing in our system.  His previous echocardiogram notes severe LV dysfunction.

## 2019-07-26 PROBLEM — I50.42 CHRONIC COMBINED SYSTOLIC AND DIASTOLIC HEART FAILURE: Status: ACTIVE | Noted: 2018-01-17

## 2019-07-26 NOTE — HOSPITAL COURSE
55 yo male admitted for NSTEMI. Troponin uptrending. CTA to ro PE wo PE but possible PNA. EKG appears improved from prior EKGs but with some possible lateral ST abnormalities. Resumed home asa/plavix/statin/bb/acei. Started on Lovenox for ACS. Cardiology consulted. TTE noting 30% EF with severe DD. Patient underwent C severe triple-vessel coronary artery disease.  Patent LIMA to LAD, SVG to OM and SVG to diagonal.  No acute culprit lesion identified. Subsequent day patient felt much better. Stable for d/c with CAD and CHF meds. FU with cardiology clinic in one week.

## 2019-07-26 NOTE — PROGRESS NOTES
Patient awake, alert, oriented resting comfortably. No signs of distress observed. bed low and locked. Call light in reach. Report given to oncoming nurse, JUAN Goodson. 12hour chart check complete.

## 2019-07-26 NOTE — NURSING
Report received from RAMSES Davalos. Patient awake and alert. NAD noted. Safety maintained. Will continue to monitor.

## 2019-07-26 NOTE — PROGRESS NOTES
Patient lying in bed aaox4. Vitals stable, right groin site has no bleeding noted, denies any pain or discomfort. Neuro checks and vitals take per orders. Will remain bedrest until 8pm.

## 2019-07-26 NOTE — SUBJECTIVE & OBJECTIVE
Interval History: No acute events overnight. CP now resolved post nitro. No other complaints. Planning for [facility] today.    Review of Systems   Constitutional: Negative for chills, diaphoresis, fatigue and fever.   HENT: Negative.    Respiratory: Positive for cough and shortness of breath. Negative for choking, chest tightness and stridor.    Cardiovascular: Negative for chest pain (resolved now with nitro), palpitations and leg swelling.   Gastrointestinal: Positive for diarrhea. Negative for abdominal distention, abdominal pain, anal bleeding, blood in stool and constipation.   Genitourinary: Negative for flank pain, frequency, hematuria and urgency.   Musculoskeletal: Negative for back pain, gait problem, joint swelling, myalgias and neck pain.   Skin: Negative.    Neurological: Negative.    Hematological: Negative.    Psychiatric/Behavioral: Negative.      Objective:     Vital Signs (Most Recent):  Temp: 98 °F (36.7 °C) (07/26/19 1112)  Pulse: 70 (07/26/19 1112)  Resp: 18 (07/26/19 1112)  BP: (!) 136/91 (07/26/19 1112)  SpO2: 95 % (07/26/19 1112) Vital Signs (24h Range):  Temp:  [97.5 °F (36.4 °C)-98 °F (36.7 °C)] 98 °F (36.7 °C)  Pulse:  [61-96] 70  Resp:  [16-22] 18  SpO2:  [91 %-98 %] 95 %  BP: (136-199)/() 136/91     Weight: 82.4 kg (181 lb 10.5 oz)  Body mass index is 29.32 kg/m².    Intake/Output Summary (Last 24 hours) at 7/26/2019 1223  Last data filed at 7/26/2019 0854  Gross per 24 hour   Intake 290 ml   Output 1275 ml   Net -985 ml      Physical Exam   Constitutional: He is oriented to person, place, and time. He appears well-developed and well-nourished. No distress.   HENT:   Head: Normocephalic and atraumatic.   Mouth/Throat: No oropharyngeal exudate.   Eyes: Pupils are equal, round, and reactive to light. EOM are normal. No scleral icterus.   Neck: Normal range of motion. Neck supple.   Cardiovascular: Normal rate and regular rhythm.   Pulmonary/Chest: Effort normal.   Decreased BS bilaterally  with faint bibasilar rales.   Abdominal: Soft. Bowel sounds are normal.   Musculoskeletal: Normal range of motion. He exhibits edema. He exhibits no tenderness or deformity.   Neurological: He is alert and oriented to person, place, and time.   Skin: Skin is warm and dry. Capillary refill takes less than 2 seconds. No rash noted. He is not diaphoretic. No erythema. No pallor.   Psychiatric: He has a normal mood and affect. His behavior is normal. Judgment and thought content normal.   Nursing note and vitals reviewed.      Significant Labs: All pertinent labs within the past 24 hours have been reviewed.    Significant Imaging: I have reviewed and interpreted all pertinent imaging results/findings within the past 24 hours.

## 2019-07-26 NOTE — ASSESSMENT & PLAN NOTE
Secondary to above. IV lasix. Fluid restriction 1500. Cardiac diet. Daily standing weight. Strict in.outs.

## 2019-07-26 NOTE — BRIEF OP NOTE
[facility]  Brief Operative Note     SUMMARY     Surgery Date: 7/26/2019     Surgeon(s) and Role:     * Chadwick Ramirez MD - Primary    Assisting Surgeon: None    Pre-op Diagnosis:  NSTEMI (non-ST elevated myocardial infarction) [I21.4]    Post-op Diagnosis:  Post-Op Diagnosis Codes:     * NSTEMI (non-ST elevated myocardial infarction) [I21.4]    Procedure(s) (LRB):  ANGIOGRAM, CORONARY, INCLUDING BYPASS GRAFT, WITH LEFT HEART CATHETERIZATION (N/A)    Anesthesia: RN IV Sedation    Description of the findings of the procedure:   1.  Severe triple-vessel coronary artery disease  2.  Patent BARAJAS to LAD, patent SVG to diagonal 1 and patent SVG to circumflex.  No culprit stenosis noted in these grafts  3.  Mid to distal RCA .  Distal RCA fills with collaterals from the left.  Unchanged from prior.    Findings/Key Components:  LVEDP: 19 mmHg      Dominance: Right   LM:  Patent with no significant stenosis  LAD:  Diffusely disease proximal to mid.  Fills with LIMA to LAD and SVG to diagonal 1  LCx:  Native vessel diffusely disease.  Fills with SVG to OM2  RCA:  Proximal 70% stenosis.  Distal .  Fills with collaterals from the left.    Hemostasis:    RFA 5 [place] sheath.  Manual pressure held in the cath lab for hemostasis    Impression:  Severe triple-vessel coronary artery disease.  Patent LIMA to LAD, SVG to OM and SVG to diagonal.  No acute culprit lesion identified    Plan:  Aspirin 81 mg daily indefinitely  Plavix 75 mg daily for at least 1 year  Follow-up in Cardiology Clinic  Estimated Blood Loss: < 10 cc       Specimens: None

## 2019-07-26 NOTE — ASSESSMENT & PLAN NOTE
Presenting with CP and SOB found to have elevated troponin. EKG wo STEMI but noting possible lateral ischemic changes. CTA done to ro PE neg for PE, but noted bilateral patchy consolidation concerning for PNA and bilateral pleural effusion. Cardiology consulted. TTE ordered. Resume home ASA/Plavix/Statin/bb/ACEi. Started on therapeutic Lovenox for ACS. Diurese with IV lasix. TTE ordered. Planned for [facility] today.

## 2019-07-26 NOTE — H&P
[facility] Hospital Medicine  History & Physical    Patient Name: Abdoul Ochoa  MRN: [id number]  Admission Date: 07/25/2019  Attending Physician: Aftab Briones MD, MPH      PCP:     Primary Doctor No    CC:     Chief Complaint   Patient presents with    GI Problem     N/V/D x 3 days reports history of Hepatitis and states he missed his last dose of medications    Weakness       HISTORY OF PRESENT ILLNESS:     Abdoul Ochoa is a 54 y.o. male that (in part)  has a past medical history of Coronary artery disease, Hyperlipidemia, Hypertension, MI (myocardial infarction), and Stroke.  has a past surgical history that includes Bypass Graft; Tracheostomy closure (2014); Cardiac surgery (01/2018); and Coronary angioplasty with stent. Presents to [facility] Emergency Department initially complaining of nausea, vomiting, and diarrhea for 3 days.  He denies him hematemesis, melena, or dyspepsia.  He is concerned may be related to his hepatitis which he was being treated for while in alf earlier this year.  When last 2 weeks he has had progressively worsening malaise and fatigue.  And then last night he began having chest pain and shortness of breath.  Symptoms were acute, constant, and severe.  He reports having cyanosis last night and experiencing near-syncope.  No report of fever chills.  No cough, hemoptysis, or productive sputum.    March of last year he was evaluated with left heart catheterization but did not undergo percutaneous intervention at that time.  He reports medication and diet compliance.  However he does smoke and has a history of cocaine abuse.    In the emergency department routine laboratory studies, chest x-ray, EKG, cardiac enzymes were obtained.  He had elevated troponin level.  ACS protocol was initiated and Cardiology was consulted.  Initial troponin 1.68 for followed by 1.81 approximately 6 hr later.  Cardiology evaluated the patient.  Detailed plan  outlined below.    Hospital medicine has been asked to admit for further evaluation and treatment.       REVIEW OF SYSTEMS:     -- Constitutional: No fever or chills.  -- Eyes: No visual changes, diplopia, pain, tearing, blind spots, or discharge.   -- Ears, nose, mouth, throat, and face: No congestion, sore throat, epistaxis, d/c, bleeding gums, neck stiffness masses, or dental issues.  -- Respiratory:  Shortness of breath and dyspnea with exertion.  No cough, s hemoptysis, stridor, wheezing, or night sweats.  -- Cardiovascular:  As above in the HPI.   -- Gastrointestinal:  History of treated partially hepatitis.  No vomiting, abdominal pain, hematemesis, melena, dyspepsia, or change in bowel habits.  -- Genitourinary: No hematuria, dysuria, frequency, urgency, nocturia, polyuria, stones, or incontinence.  -- Integument/breast: No rash, pruritis, pigmentation changes, dryness, or changes in hair  -- Hematologic/lymphatic: No easy bruising or lymphadenopathy.   -- Musculoskeletal: No acute arthralgias, acute myalgias, joint swelling, acute limitations of ROM, or acute muscular weakness.  -- Neurological: No seizures, headaches, incoordination, paraesthesias, ataxia, vertigo, or tremors.  -- Behavioral/Psych: No auditory or visual hallucinations, depression, or suicidal/homicidal ideations.  -- Endocrine: No heat or cold intolerance, polydipsia, or unintentional weight gain / loss.  -- Allergy/Immunologic: No recurrent infections or adverse reaction to food, insects, or difficulty breathing.        PAST MEDICAL / SURGICAL HISTORY:     Past Medical History:   Diagnosis Date    Coronary artery disease     Hyperlipidemia     Hypertension     MI (myocardial infarction)     2013, 2015, 2016, 2018 (total of 4 stents), triple CABG January 2018    Stroke 09/2014     Past Surgical History:   Procedure Laterality Date    BYPASS GRAFT      CARDIAC SURGERY  01/2018    three vessel CABG    CORONARY ANGIOPLASTY WITH STENT  "PLACEMENT      4 stents    TRACHEOSTOMY CLOSURE           FAMILY HISTORY:     Family History   Problem Relation Age of Onset    Heart disease Mother     Hypertension Mother     Heart disease Father     Hypertension Father          SOCIAL HISTORY:     Social History     Socioeconomic History    Marital status: Single     Spouse name: Not on file    Number of children: Not on file    Years of education: Not on file    Highest education level: Not on file   Occupational History    Not on file   Social Needs    Financial resource strain: Not on file    Food insecurity:     Worry: Not on file     Inability: Not on file    Transportation needs:     Medical: Not on file     Non-medical: Not on file   Tobacco Use    Smoking status: Former Smoker     Types: Cigarettes     Last attempt to quit: 2014     Years since quittin.1    Smokeless tobacco: Never Used   Substance and Sexual Activity    Alcohol use: Yes     Frequency: Never     Comment: beer on the weekends "barely"    Drug use: No    Sexual activity: Yes     Partners: Female   Lifestyle    Physical activity:     Days per week: Not on file     Minutes per session: Not on file    Stress: Not on file   Relationships    Social connections:     Talks on phone: Not on file     Gets together: Not on file     Attends Rastafari service: Not on file     Active member of club or organization: Not on file     Attends meetings of clubs or organizations: Not on file     Relationship status: Not on file   Other Topics Concern    Not on file   Social History Narrative    Not on file         ALLERGIES:       Review of patient's allergies indicates:  No Known Allergies      HOME MEDICATIONS:     Prior to Admission medications    Medication Sig Start Date End Date Taking? Authorizing Provider   aspirin 81 MG Chew Take 81 mg by mouth once daily.   Yes Historical Provider, MD   atorvastatin (LIPITOR) 10 MG tablet Take by mouth once daily.   Yes Historical " "Provider, MD   carvedilol (COREG) 12.5 MG tablet Take 1 tablet (12.5 mg total) by mouth 2 (two) times daily. 1/19/18 7/25/19 Yes Marline Ott MD   lisinopril 10 MG tablet Take 10 mg by mouth once daily.   Yes Historical Provider, MD   clopidogrel (PLAVIX) 75 mg tablet Take 1 tablet (75 mg total) by mouth once daily. 1/19/18 1/19/19  Marline Ott MD   nitroGLYCERIN (NITROSTAT) 0.4 MG SL tablet Place 0.4 mg under the tongue every 5 (five) minutes as needed for Chest pain.    Historical Provider, MD          HOSPITAL MEDICATIONS:     Scheduled Meds:    [START ON 7/26/2019] aspirin  81 mg Oral Daily    atorvastatin  40 mg Oral QHS    azithromycin  500 mg Oral Daily    carvedilol  12.5 mg Oral BID    cefTRIAXone (ROCEPHIN) IVPB  1 g Intravenous Q24H    clopidogrel  75 mg Oral Daily    enoxaparin  1 mg/kg Subcutaneous Q12H    furosemide  40 mg Intravenous Once    [START ON 7/26/2019] lisinopril  10 mg Oral Daily    nitroGLYCERIN 2% TD oint  2 inch Topical (Top) Q6H     Continuous Infusions:   PRN Meds: hydrALAZINE, metoprolol, ondansetron, prochlorperazine, sodium chloride 0.9%      PHYSICAL EXAM:     Wt Readings from Last 1 Encounters:   07/25/19 1950 84.5 kg (186 lb 4.8 oz)   07/25/19 1227 76.2 kg (168 lb)     Body mass index is 30.07 kg/m².  Vitals:    07/25/19 1625 07/25/19 1747 07/25/19 1833 07/25/19 1950   BP: (!) 136/103 (!) 156/93 (!) 192/96 (!) 177/108   BP Location:   Left arm    Patient Position:   Lying    Pulse: 93 94 61 96   Resp:   18 19   Temp:   97.8 °F (36.6 °C) 97.8 °F (36.6 °C)   TempSrc:   Oral    SpO2: (!) 91% 95%  (!) 92%   Weight:    84.5 kg (186 lb 4.8 oz)   Height:    5' 6" (1.676 m)          -- General appearance: well developed. appears stated age   -- Head: normocephalic, atraumatic   -- Eyes: conjunctivae clear. Extraocular muscles intact  -- Nose: Nares normal. Septum midline.   -- Mouth/Throat: lips, mucosa, and tongue normal. no throat erythema.   -- Neck: supple, symmetrical, " trachea midline, no JVD and thyroid not grossly enlarged, appears symmetric  -- Lungs: clear to auscultation bilaterally. normal respiratory effort. No use of accessory muscles.   -- Chest wall: no tenderness. equal bilateral chest rise   -- Heart:  Rapid rate and regular rhythm. S1, S2 normal.  no click, rub or gallop   -- Abdomen: soft, non-tender, non-distended, non-tympanic; bowel sounds normal; no masses  -- Extremities: no cyanosis, clubbing or edema.   -- Pulses: 2+ and symmetric   -- Skin: color normal, texture normal, turgor normal. No rashes or lesions.   -- Neurologic: Normal strength and tone. No focal numbness or weakness. CNII-XII intact. Hector coma scale: eyes open spontaneously-4, oriented & converses-5, obeys commands-6.      LABORATORY STUDIES:     Recent Results (from the past 36 hour(s))   CBC auto differential    Collection Time: 07/25/19  1:19 PM   Result Value Ref Range    WBC 5.15 3.90 - 12.70 K/uL    RBC 4.20 (L) 4.60 - 6.20 M/uL    Hemoglobin 12.1 (L) 14.0 - 18.0 g/dL    Hematocrit 37.1 (L) 40.0 - 54.0 %    Mean Corpuscular Volume 88 82 - 98 fL    Mean Corpuscular Hemoglobin 28.8 27.0 - 31.0 pg    Mean Corpuscular Hemoglobin Conc 32.6 32.0 - 36.0 g/dL    RDW 15.8 (H) 11.5 - 14.5 %    Platelets 272 150 - 350 K/uL    MPV 12.4 9.2 - 12.9 fL    Gran # (ANC) 2.6 1.8 - 7.7 K/uL    Lymph # 1.6 1.0 - 4.8 K/uL    Mono # 1.0 0.3 - 1.0 K/uL    Eos # 0.0 0.0 - 0.5 K/uL    Baso # 0.04 0.00 - 0.20 K/uL    Gran% 49.7 38.0 - 73.0 %    Lymph% 30.1 18.0 - 48.0 %    Mono% 19.2 (H) 4.0 - 15.0 %    Eosinophil% 0.4 0.0 - 8.0 %    Basophil% 0.8 0.0 - 1.9 %    Differential Method Automated    Lipase    Collection Time: 07/25/19  1:19 PM   Result Value Ref Range    Lipase 14 4 - 60 U/L   Comprehensive metabolic panel    Collection Time: 07/25/19  1:19 PM   Result Value Ref Range    Sodium 137 136 - 145 mmol/L    Potassium 4.3 3.5 - 5.1 mmol/L    Chloride 105 95 - 110 mmol/L    CO2 22 (L) 23 - 29 mmol/L    Glucose  89 70 - 110 mg/dL    BUN, Bld 20 6 - 20 mg/dL    Creatinine 1.5 (H) 0.5 - 1.4 mg/dL    Calcium 8.8 8.7 - 10.5 mg/dL    Total Protein 6.4 6.0 - 8.4 g/dL    Albumin 3.4 (L) 3.5 - 5.2 g/dL    Total Bilirubin 1.7 (H) 0.1 - 1.0 mg/dL    Alkaline Phosphatase 57 55 - 135 U/L    AST 43 (H) 10 - 40 U/L    ALT 34 10 - 44 U/L    Anion Gap 10 8 - 16 mmol/L    eGFR if African American 59 (A) >60 mL/min/1.73 m^2    eGFR if non African American 51 (A) >60 mL/min/1.73 m^2   Troponin I    Collection Time: 07/25/19  1:19 PM   Result Value Ref Range    Troponin I 1.684 (H) 0.000 - 0.026 ng/mL   Magnesium    Collection Time: 07/25/19  1:19 PM   Result Value Ref Range    Magnesium 1.9 1.6 - 2.6 mg/dL   CK-MB    Collection Time: 07/25/19  1:19 PM   Result Value Ref Range     (H) 20 - 200 U/L    CPK MB 3.2 0.1 - 6.5 ng/mL    MB% 1.4 0.0 - 5.0 %   CPK    Collection Time: 07/25/19  1:19 PM   Result Value Ref Range     (H) 20 - 200 U/L   Lactic acid, plasma    Collection Time: 07/25/19  3:45 PM   Result Value Ref Range    Lactate (Lactic Acid) 2.1 0.5 - 2.2 mmol/L   Troponin I    Collection Time: 07/25/19  7:40 PM   Result Value Ref Range    Troponin I 1.812 (H) 0.000 - 0.026 ng/mL       Lab Results   Component Value Date    INR 1.1 01/17/2018     Lab Results   Component Value Date    HGBA1C 5.3 01/18/2018     No results for input(s): POCTGLUCOSE in the last 72 hours.        MICROBIOLOGY DATA:     No results found for: LABGENI, LABREFE, LABUPPE, LABURIN, LABAFB    Microbiology x 7d:   Microbiology Results (last 7 days)     Procedure Component Value Units Date/Time    Blood culture [[id number]] Collected:  07/25/19 1542    Order Status:  Sent Specimen:  Blood from Peripheral, Hand, Right Updated:  07/25/19 1618    Blood culture [[id number]] Collected:  07/25/19 1545    Order Status:  Sent Specimen:  Blood from Peripheral, Hand, Left Updated:  07/25/19 1547            IMAGING:     Imaging Results           CTA Chest Non-Coronary  (PE Study) (Final result)  Result time 07/25/19 15:13:18    Final result by Alonso Palacios Jr., MD (07/25/19 15:13:18)                 Impression:      There are patchy areas of parenchymal consolidation bilaterally right slightly more than left.  This most likely represents bilateral pneumonic infiltrates however aspiration, blood products and least likely alveolar edema need to be considered.  Bilateral pleural effusions.  Correlation with clinical findings would be helpful.    Enlarged right hilar node and scattered mediastinal nodes likely related to the pulmonary parenchymal process.    No findings to indicate pulmonary embolus.    Reflux of contrast into the IVC and hepatic veins suggest elevated right heart pressure.    This report was flagged in Epic as abnormal.      Electronically signed by: Alonso Palacios MD  Date:    07/25/2019  Time:    15:13             Narrative:    EXAMINATION:  CTA CHEST NON CORONARY    CLINICAL HISTORY:  r/o PE and eval cxr abnormalities;    TECHNIQUE:  Low dose axial images, sagittal and coronal reformations were obtained from the thoracic inlet to the lung bases following the IV administration of 75 mL of Omnipaque 350.  Contrast timing was optimized to evaluate the pulmonary arteries.  MIP images were performed.    COMPARISON:  Chest x-ray July 25, 2019.    FINDINGS:  There is good opacification of the pulmonary arteries.  No intraluminal filling defects identified to indicate a pulmonary embolus.    Elsewhere visualized thyroid is normal.  Some nonenlarged mediastinal nodes are present.  1.7 cm right hilar node.  There are small bilateral pleural effusions.  There is also reflux of contrast into the hepatic veins suggesting some elevated right heart pressure.  No significant pericardial fluid.    Evaluation of the lungs demonstrate some emphysematous changes.  There are patchy areas of parenchymal opacification bilaterally.  This involves the right upper, right middle and right  lower lobes.  There is also some patchy parenchymal consolidation in the left upper lobe.  Some patchy opacities at the left lung base as well.  There is some respiratory motion artifact on the images.    Images of the upper abdomen demonstrate the reflux of contrast into the hepatic veins.  Visualized spleen is normal.  Adrenal glands incompletely visualized.    Bone windows demonstrate postoperative change the cervical spine.  No convincing lytic or blastic lesions.                               X-Ray Chest AP Portable (Final result)  Result time 07/25/19 13:33:02    Final result by Emily Nicholson MD (07/25/19 13:33:02)                 Impression:      Abnormal chest radiograph.      Electronically signed by: Emily Nicholson MD  Date:    07/25/2019  Time:    13:33             Narrative:    EXAMINATION:  XR CHEST AP PORTABLE    CLINICAL HISTORY:  chest pain;    TECHNIQUE:  Single frontal view of the chest was performed.    COMPARISON:  03/25/2018.    FINDINGS:  Sternal sutures are intact and aligned following remote CABG.  Anterior fusion hardware projects over the cervicothoracic junction of the spine.    Mediastinal structures are midline.  Cardiac silhouette is moderately enlarged similar to 03/25/2018 suggesting ventricular dilatation, pericardial fluid or both.    There is a new ovoid soft tissue opacity with ill-defined margins, 4 x 3 cm, projecting over the right upper lung zone at the level of the 1st and 2nd anterior intercostal spaces overlying the 4th through 6th intercostal spaces.  There is a 2nd region of increased attenuation with ill-defined margins, 3 cm in long axis, in the medial aspect of the right lower lung zone obscuring the dome of the right hemidiaphragm.  More modest ill-defined patchy opacities in subsegmental distribution are present elsewhere in the right lung and also in the left mid and upper lung zones.  These findings are new since 03/25/2018, 16 months earlier.  Differential  diagnosis includes multifocal pneumonia, aspiration or other noninfectious inflammation and neoplasm.    -If the patient has clinical evidence of pneumonia I recommend institution of appropriate clinical therapy and repeat chest radiograph after an interval of 4 weeks; radiographic resolution of pneumonia lags behind clinical response.    -If the patient does not have convincing clinical evidence of pneumonia I recommend chest CT, preferably with intravenous contrast medium.    I detect no pleural fluid, pneumothorax, pneumomediastinum, pneumoperitoneum or significant osseous abnormality.                                  CONSULTS:     IP CONSULT TO CARDIOLOGY       ASSESSMENT & PLAN:     Primary Diagnosis:  NSTEMI (non-ST elevated myocardial infarction)    Active Hospital Problems    Diagnosis  POA    *NSTEMI (non-ST elevated myocardial infarction) [I21.4]  Yes     Priority: 1 - High    Coronary artery disease [I25.10]  Yes     Chronic    History of CVA (cerebrovascular accident) [Z86.73]  Not Applicable     Chronic    Anemia of chronic disease [D63.8]  Yes     Chronic    Acute on chronic combined systolic (congestive) and diastolic (congestive) heart failure [I50.43]  Yes    Hypertension [I10]  Yes     Chronic    Mixed hyperlipidemia [E78.2]  Yes     Chronic    Tobacco abuse [Z72.0]  Yes     Chronic    Cocaine abuse [F14.10]  Yes     Chronic      Resolved Hospital Problems   No resolved problems to display.         Her cardiology:   NSTEMI (non-ST elevated myocardial infarction)  Current CP seems atypical/respirophasic.  CTA with concern for ?PNA  EKG abnl, but shonda so than prev admission for NSTEMI 3/2018  Elev trop, similar to prior presentation 3/2018 (cath at that time without culprit; noted made of collateralized  RCA without graft) and severe LV dysfxn with mod-sev MR.  Cont med rx CAD  Agree with enox  Resume ASA/Plavix/Statin/BBl/ACEi  Check echo  Diurese with IV lasix  Will consider cath  pending clinical course and rx of PNA/CHF.  Keep NPO after MN for possible cath 7/26/19.    Per cardiology:  Acute on chronic combined systolic (congestive) and diastolic (congestive) heart failure  As above    Hypertension  · o Goal while inpatient is a systolic blood pressure less than 140mmHg, given elevated troponin level consistent with non ST elevation MI  · BP is not in acceptable range at this time; also a tachycardia - additional antihypertensives ordered  · Continue current home regimen with hold parameters  · PRN antihypertensives available      Hyperlipidemia   · Lipid panel - pending fast lipid panel  · Cardiac diet  · Continue statin      Per cardiology:  Coronary artery disease  Known ICM  Also with significant MR on prev echo  Repeat echo  Will consider cath pending clinical course and rx of CHF/PNA      History of cocaine abuse  Urine toxicology pending.  Beta-blockers would be contraindicated as he is cocaine positive    Tobacco abuse   · Chronic tobacco use  · Tobacco cessation counseling  · Nicotine patch contraindicated in setting of NSTEMI; consider Wellbutrin or Chantix through PCP as an outpatient (will require closer monitoring)  · I discussed with the patient regarding the hazardous effects of smoking on increasing risk of heart attack and stroke, worsening lung functions, and increasing cancer risk.   Patient was urged to stop smoking now.  I also offered nicotine taper (such as nicotine patch and gum) to help ease the craving to smoke.    Anemia of chronic disease  · The patient's H/H is stable and consistent with previous laboratory measurements.  · The patient exhibits no signs or symptoms of acute bleeding.  · There is no indication for transfusion.  · Will continue to monitor.        VTE Risk Mitigation (From admission, onward)        Ordered     IP VTE HIGH RISK PATIENT  Once      07/25/19 1832     Place CHAPITO hose  Until discontinued      07/25/19 1832     Place sequential compression  device  Until discontinued      07/25/19 1832     enoxaparin injection 80 mg  Every 12 hours (non-standard times)      07/25/19 1647            Adult PRN medications available   DVT prophylaxis given       DISPOSITION:     Will admit to the Hospital Medicine service for further evaluation and treatment.    Chart reviewed and updated where applicable.    High Risk Conditions:  Patient has a condition that poses threat to life and bodily function:  Non ST-elevation MI      ===============================================================    Aftab Briones MD, MPH  Department of Hospital Medicine   [facility]  [phone] pg  (7pm - 6am)          This note is dictated using [employer] voice recognition software.  There are word recognition mistakes that are occasionally missed on review.

## 2019-07-26 NOTE — NURSING
Report Recieved from off going nurse Kellee. Patient is resting in bed, NAD noted chest rise and fall. Bed in lowest position, wheels locked, call light in reach. IV clean, dry and intact.

## 2019-07-26 NOTE — CARE UPDATE
NSTEMI: Risks, benefits and alternatives of the catheterization procedure were discussed with the patient which include but are not limited to: bleeding, infection, death, heart attack, arrhythmia, kidney failure, stroke, need for emergency surgery, hematoma, pseudoaneurysm etc.  Patient understands and and agrees to proceed.  Consent was placed on the chart.    Cath today

## 2019-07-26 NOTE — HPI
Abdoul Ochoa is a 54 y.o. male that (in part)  has a past medical history of Coronary artery disease, Hyperlipidemia, Hypertension, MI (myocardial infarction), and Stroke.  has a past surgical history that includes Bypass Graft; Tracheostomy closure (2014); Cardiac surgery (01/2018); and Coronary angioplasty with stent. Presents to [facility] Emergency Department initially complaining of nausea, vomiting, and diarrhea for 3 days.  He denies him hematemesis, melena, or dyspepsia.  He is concerned may be related to his hepatitis which he was being treated for while in long-term earlier this year.  When last 2 weeks he has had progressively worsening malaise and fatigue.  And then last night he began having chest pain and shortness of breath.  Symptoms were acute, constant, and severe.  He reports having cyanosis last night and experiencing near-syncope.  No report of fever chills.  No cough, hemoptysis, or productive sputum.    March of last year he was evaluated with left heart catheterization but did not undergo percutaneous intervention at that time.  He reports medication and diet compliance.  However he does smoke and has a history of cocaine abuse.    In the emergency department routine laboratory studies, chest x-ray, EKG, cardiac enzymes were obtained.  He had elevated troponin level.  ACS protocol was initiated and Cardiology was consulted.  Initial troponin 1.68 for followed by 1.81 approximately 6 hr later.  Cardiology evaluated the patient.  Detailed plan outlined below.    Hospital medicine has been asked to admit for further evaluation and treatment.

## 2019-07-26 NOTE — PLAN OF CARE
"To patient's room to discuss patient managing his care at home.      TN Role Explained.  Patient identified by using 2 identifiers:  Name and date of birth    Patient stated that hsi sister, conchita WILL HELP AT HOME WITH his RECOVERY.       TN reviewed with patient contents of "[employer] Packet".      TN name and contact info placed on the communication board    Preferred Pharmacy:  [id number] - [place], [place] - [address]  [address]  [place] [address]  Phone: [phone] Fax: [phone]       07/26/19 4659   Discharge Assessment   Assessment Type Discharge Planning Assessment   Confirmed/corrected address and phone number on facesheet? Yes   Assessment information obtained from? Patient   Expected Length of Stay (days) 3   Communicated expected length of stay with patient/caregiver yes   Prior to hospitilization cognitive status: Alert/Oriented   Prior to hospitalization functional status: Independent   Current cognitive status: Alert/Oriented   Current Functional Status: Independent   Lives With sibling(s)   Able to Return to Prior Arrangements yes   Is patient able to care for self after discharge? Yes   Patient's perception of discharge disposition home or selfcare   Readmission Within the Last 30 Days no previous admission in last 30 days   Patient currently being followed by outpatient case management? No   Patient currently receives any other outside agency services? No   Equipment Currently Used at Home none   Do you have any problems affording any of your prescribed medications? No   Is the patient taking medications as prescribed? yes   Does the patient have transportation home? Yes   Transportation Anticipated family or friend will provide   Does the patient receive services at the Coumadin Clinic? No   Discharge Plan A Home with family   DME Needed Upon Discharge  none   Patient/Family in Agreement with Plan yes     "

## 2019-07-26 NOTE — PROGRESS NOTES
[facility] Hospital Medicine  Progress Note    Patient Name: Abdoul Ochoa  MRN: [id number]  Patient Class: IP- Inpatient   Admission Date: 7/25/2019  Length of Stay: 1 days  Attending Physician: Segundo Ovalle MD  Primary Care Provider: Primary Doctor No      Subjective:     Principal Problem:NSTEMI (non-ST elevated myocardial infarction)      HPI:  Abdoul Ochoa is a 54 y.o. male that (in part)  has a past medical history of Coronary artery disease, Hyperlipidemia, Hypertension, MI (myocardial infarction), and Stroke.  has a past surgical history that includes Bypass Graft; Tracheostomy closure (2014); Cardiac surgery (01/2018); and Coronary angioplasty with stent. Presents to [facility] Emergency Department initially complaining of nausea, vomiting, and diarrhea for 3 days.  He denies him hematemesis, melena, or dyspepsia.  He is concerned may be related to his hepatitis which he was being treated for while in intermediate earlier this year.  When last 2 weeks he has had progressively worsening malaise and fatigue.  And then last night he began having chest pain and shortness of breath.  Symptoms were acute, constant, and severe.  He reports having cyanosis last night and experiencing near-syncope.  No report of fever chills.  No cough, hemoptysis, or productive sputum.    March of last year he was evaluated with left heart catheterization but did not undergo percutaneous intervention at that time.  He reports medication and diet compliance.  However he does smoke and has a history of cocaine abuse.    In the emergency department routine laboratory studies, chest x-ray, EKG, cardiac enzymes were obtained.  He had elevated troponin level.  ACS protocol was initiated and Cardiology was consulted.  Initial troponin 1.68 for followed by 1.81 approximately 6 hr later.  Cardiology evaluated the patient.  Detailed plan outlined below.    Hospital medicine has been asked to admit for  further evaluation and treatment.     Overview/Hospital Course:  53 yo male admitted for NSTEMI. Troponin uptrending. CTA to ro PE wo PE but possible PNA. EKG appears improved from prior EKGs but with some possible lateral ST abnormalities. Resumed home asa/plavix/statin/bb/acei. Started on Lovenox for ACS. Cardiology consulted. TTE pending. Planning for LHC today.    Interval History: No acute events overnight. CP now resolved post nitro. No other complaints. Planning for LHC today.    Review of Systems   Constitutional: Negative for chills, diaphoresis, fatigue and fever.   HENT: Negative.    Respiratory: Positive for cough and shortness of breath. Negative for choking, chest tightness and stridor.    Cardiovascular: Negative for chest pain (resolved now with nitro), palpitations and leg swelling.   Gastrointestinal: Positive for diarrhea. Negative for abdominal distention, abdominal pain, anal bleeding, blood in stool and constipation.   Genitourinary: Negative for flank pain, frequency, hematuria and urgency.   Musculoskeletal: Negative for back pain, gait problem, joint swelling, myalgias and neck pain.   Skin: Negative.    Neurological: Negative.    Hematological: Negative.    Psychiatric/Behavioral: Negative.      Objective:     Vital Signs (Most Recent):  Temp: 98 °F (36.7 °C) (07/26/19 1112)  Pulse: 70 (07/26/19 1112)  Resp: 18 (07/26/19 1112)  BP: (!) 136/91 (07/26/19 1112)  SpO2: 95 % (07/26/19 1112) Vital Signs (24h Range):  Temp:  [97.5 °F (36.4 °C)-98 °F (36.7 °C)] 98 °F (36.7 °C)  Pulse:  [61-96] 70  Resp:  [16-22] 18  SpO2:  [91 %-98 %] 95 %  BP: (136-199)/() 136/91     Weight: 82.4 kg (181 lb 10.5 oz)  Body mass index is 29.32 kg/m².    Intake/Output Summary (Last 24 hours) at 7/26/2019 1223  Last data filed at 7/26/2019 0854  Gross per 24 hour   Intake 290 ml   Output 1275 ml   Net -985 ml      Physical Exam   Constitutional: He is oriented to person, place, and time. He appears  well-developed and well-nourished. No distress.   HENT:   Head: Normocephalic and atraumatic.   Mouth/Throat: No oropharyngeal exudate.   Eyes: Pupils are equal, round, and reactive to light. EOM are normal. No scleral icterus.   Neck: Normal range of motion. Neck supple.   Cardiovascular: Normal rate and regular rhythm.   Pulmonary/Chest: Effort normal.   Decreased BS bilaterally with faint bibasilar rales.   Abdominal: Soft. Bowel sounds are normal.   Musculoskeletal: Normal range of motion. He exhibits edema. He exhibits no tenderness or deformity.   Neurological: He is alert and oriented to person, place, and time.   Skin: Skin is warm and dry. Capillary refill takes less than 2 seconds. No rash noted. He is not diaphoretic. No erythema. No pallor.   Psychiatric: He has a normal mood and affect. His behavior is normal. Judgment and thought content normal.   Nursing note and vitals reviewed.      Significant Labs: All pertinent labs within the past 24 hours have been reviewed.    Significant Imaging: I have reviewed and interpreted all pertinent imaging results/findings within the past 24 hours.      Assessment/Plan:      * NSTEMI (non-ST elevated myocardial infarction)  Presenting with CP and SOB found to have elevated troponin. EKG wo STEMI but noting possible lateral ischemic changes. CTA done to ro PE neg for PE, but noted bilateral patchy consolidation concerning for PNA and bilateral pleural effusion. Cardiology consulted. TTE ordered. Resume home ASA/Plavix/Statin/bb/ACEi. Started on therapeutic Lovenox for ACS. Diurese with IV lasix. TTE ordered. Planned for [facility] today.    Acute on chronic combined systolic and diastolic heart failure  Secondary to above. IV lasix. Fluid restriction 1500. Cardiac diet. Daily standing weight. Strict in.outs.    Anemia of chronic disease  Stable.    Coronary artery disease  As above. Has hx of know ICM.    History of CVA (cerebrovascular accident)  Resumed home asa and  statin.    Tobacco abuse  Encouraged cessation. Dr. Briones has counseled patient (as per HPI).    Cocaine abuse  Hx of cocaine use. Last used 4 years ago. Drug screen neg.    Mixed hyperlipidemia  Resume home statin.    Hypertension  Resume home meds      VTE Risk Mitigation (From admission, onward)        Ordered     IP VTE HIGH RISK PATIENT  Once      07/25/19 1832     Place CHAPITO hose  Until discontinued      07/25/19 1832     Place sequential compression device  Until discontinued      07/25/19 1832     enoxaparin injection 80 mg  Every 12 hours (non-standard times)      07/25/19 7860            Segundo Ovalle MD  Department of Hospital Medicine   [facility]

## 2019-07-27 PROBLEM — I21.4 NSTEMI (NON-ST ELEVATED MYOCARDIAL INFARCTION): Status: RESOLVED | Noted: 2019-01-01 | Resolved: 2019-01-01

## 2019-07-27 PROBLEM — I25.5 ISCHEMIC CARDIOMYOPATHY: Status: ACTIVE | Noted: 2019-01-01

## 2019-07-27 PROBLEM — I50.43 ACUTE ON CHRONIC COMBINED SYSTOLIC AND DIASTOLIC HEART FAILURE: Status: RESOLVED | Noted: 2018-01-17 | Resolved: 2019-01-01

## 2019-07-27 PROBLEM — F14.10 COCAINE ABUSE: Chronic | Status: RESOLVED | Noted: 2018-01-17 | Resolved: 2019-01-01

## 2019-07-27 NOTE — NURSING
19:15 Received bedside report from RAMSES Ochoa / RAMSES Davalos. Alert and oriented. Complains of central chest pain - pain meds given by day staff after report. No sign of distress. IV line intact over Lt AC - infusing at 50mls/Hr . Angio site checked over right groin - dressing intact no bleeding noted. Call bell given on his reach, instructed to call for assistance all the time. Bed alarm on. Bed on lowest position. Safety maintained.

## 2019-07-27 NOTE — PROGRESS NOTES
[facility] CASE MANAGEMENT                  WRITTEN DISCHARGE INFORMATION      APPOINTMENTS AND RESOURCES TO HELP YOU MANAGE YOUR CARE AT HOME BASED ON YOUR PREFERENCES:  (If an appointment is not scheduled for you when you leave the hospital, call your doctor to schedule a follow up visit within a week)    Follow-up Information     Chadwick Ramirez MD. Schedule an appointment as soon as possible for a visit in 1 week.    Specialties:  Cardiology, INTERVENTIONAL CARDIOLOGY  Why:  CALL THE OFFICE ON MONDAY AND ASK FOR AN APPOINTMENT.  EXPLAIN THAT YOU HAD A PROCEDURE.  iF UNABLE TO GET AN APPOINTMENT CALL [phone] FOR ASSISTANCE.  Contact information:  [address]  [address]  [place] [address]  [phone]                   Healthy Living Instructions to HELP MANAGE YOUR CARE AT HOME:  Things You are responsible for:  1.    Getting your prescriptions filled   2.    Taking your medications as directed, DO NOT MISS ANY DOSES!  3.    Following the diet and exercise recommended by your doctor  4.    Going to your follow-up doctor appointment. This is important because it allows the doctor to monitor your progress and determine if any changes need to made to your treatment plan.  5. If you have any questions about MANAGING YOUR CARE AT HOME Call the Nurse Care Line for 24/7 Assistance [phone]       Please answer any calls you may receive from [facility]. We want to continue to support you as you manage your healthcare needs. [facility] is happy to have the opportunity to serve you.      Thank you for choosing [facility] for your healthcare needs!  Your [facility] Case Management Team,

## 2019-07-27 NOTE — SUBJECTIVE & OBJECTIVE
Interval History:  Patient doing fine.  No chest pains.  No complications from cardiac catheterization yesterday.    Review of Systems   Constitution: Negative.   HENT: Negative.    Eyes: Negative.    Cardiovascular: Negative.    Respiratory: Negative.    Endocrine: Negative.    Hematologic/Lymphatic: Negative.    Skin: Negative.    Musculoskeletal: Negative.    Gastrointestinal: Negative.    Genitourinary: Negative.    Neurological: Negative.    Psychiatric/Behavioral: Negative.    Allergic/Immunologic: Negative.      Objective:     Vital Signs (Most Recent):  Temp: 97.9 °F (36.6 °C) (07/27/19 1107)  Pulse: 63 (07/27/19 1107)  Resp: 19 (07/27/19 1107)  BP: 118/72 (07/27/19 1107)  SpO2: 98 % (07/27/19 1107) Vital Signs (24h Range):  Temp:  [97.4 °F (36.3 °C)-98 °F (36.7 °C)] 97.9 °F (36.6 °C)  Pulse:  [51-77] 63  Resp:  [18-20] 19  SpO2:  [95 %-99 %] 98 %  BP: (118-153)/(57-99) 118/72     Weight: 79.8 kg (175 lb 14.8 oz)  Body mass index is 28.4 kg/m².     SpO2: 98 %  O2 Device (Oxygen Therapy): room air      Intake/Output Summary (Last 24 hours) at 7/27/2019 1246  Last data filed at 7/27/2019 0341  Gross per 24 hour   Intake 640 ml   Output --   Net 640 ml       Lines/Drains/Airways          None          Physical Exam   Constitutional: He is oriented to person, place, and time. He appears well-developed and well-nourished.   HENT:   Head: Normocephalic.   Eyes: Pupils are equal, round, and reactive to light. Conjunctivae are normal.   Neck: Normal range of motion. Neck supple.   Cardiovascular: Normal rate, regular rhythm and normal heart sounds.   Pulmonary/Chest: Effort normal and breath sounds normal.   Abdominal: Soft. Bowel sounds are normal.   Neurological: He is alert and oriented to person, place, and time.   Skin: Skin is warm.   Vitals reviewed.      Significant Labs:   BMP:   Recent Labs   Lab 07/25/19  1319 07/26/19  0203   GLU 89 106    138   K 4.3 3.6    102   CO2 22* 23   BUN 20 20    CREATININE 1.5* 1.6*   CALCIUM 8.8 9.0   MG 1.9  --    , CMP   Recent Labs   Lab 07/25/19  1319 07/26/19  0203    138   K 4.3 3.6    102   CO2 22* 23   GLU 89 106   BUN 20 20   CREATININE 1.5* 1.6*   CALCIUM 8.8 9.0   PROT 6.4 6.2   ALBUMIN 3.4* 3.4*   BILITOT 1.7* 2.3*   ALKPHOS 57 62   AST 43* 38   ALT 34 36   ANIONGAP 10 13   ESTGFRAFRICA 59* 55*   EGFRNONAA 51* 47*   , CBC   Recent Labs   Lab 07/25/19  1319 07/26/19  0203 07/27/19  0454   WBC 5.15 5.90 5.83   HGB 12.1* 12.5* 12.9*   HCT 37.1* 38.4* 39.8*    291 278   , INR No results for input(s): INR, PROTIME in the last 48 hours., Lipid Panel No results for input(s): CHOL, HDL, LDLCALC, TRIG, CHOLHDL in the last 48 hours., Troponin   Recent Labs   Lab 07/25/19  1940 07/26/19  0203 07/26/19  0818   TROPONINI 1.812* 1.899* 1.781*    and All pertinent lab results from the last 24 hours have been reviewed.    Significant Imaging: personally performed cardiac catheterization.  No culprit lesion found.  Triple-vessel disease with patent grafts as noted in the cardiac catheterization report.

## 2019-07-27 NOTE — PLAN OF CARE
07/27/19 1151   Post-Acute Status   Post-Acute Authorization Other   Other Status No Post-Acute Service Needs

## 2019-07-27 NOTE — NURSING
Discharge Preparation:   Note that patient awake, alert and fully oriented;  He denies pain at this time;    Case management coordination complete.    IV and telemetry monitor removed;  No bleeding to IV site;     Reviewed discharge plan with patient. He understands that he has to contact Dr. Ramirez's office (Cardiology ) on Monday morning (07/29/2019) to schedule an appointment. He repeated this  instruction.     Will transport to parking garage when transportation arrives;

## 2019-07-27 NOTE — PLAN OF CARE
Problem: Fall Injury Risk  Goal: Absence of Fall and Fall-Related Injury  Outcome: Ongoing (interventions implemented as appropriate)  Intervention: Identify and Manage Contributors to Fall Injury Risk     07/27/19 0430   Manage Acute Allergic Reaction   Medication Review/Management medications reviewed;dosing adjusted   Identify and Manage Contributors to Fall Injury Risk   Self-Care Promotion independence encouraged;BADL personal objects within reach     Intervention: Promote Injury-Free Environment     07/27/19 0430   Optimize [place] and Functional Mobility   Environmental Safety Modification assistive device/personal items within reach;clutter free environment maintained;lighting adjusted;mobility aid in reach;mattress on floor;room near unit station;room organization consistent   Optimize Balance and Safe Activity   Safety Promotion/Fall Prevention assistive device/personal item within reach;bed alarm set;side rails raised x 2;instructed to call staff for mobility;commode/urinal/bedpan at bedside;nonskid shoes/socks when out of bed

## 2019-07-27 NOTE — ASSESSMENT & PLAN NOTE
Known ICM  Also with significant MR on prev echo  Repeat echo showed moderate MR.  Underwent cardiac catheterization yesterday because of non-STEMI.  No culprit lesion found.  Severe triple-vessel disease with patent grafts found as detailed in the cardiac catheterization lab artery.  Continue aspirin, Plavix.  Follow-up in Cardiology Clinic for peripheral artery disease as well as coronary artery disease.

## 2019-07-27 NOTE — NURSING
Report given to oncoming nurse Amie Carter RN, Patient is awake and alert. Bed is in lowest position, wheels locked, call light is in reach. 12 hour chart check completed

## 2019-07-27 NOTE — ASSESSMENT & PLAN NOTE
Ischemic cardiomyopathy.  Continue long-acting beta blockers and Ace inhibitors.  Follow-up in Cardiology.

## 2019-07-27 NOTE — PROGRESS NOTES
[facility]  Cardiology  Progress Note    Patient Name: Abdoul Ochoa  MRN: [id number]  Admission Date: 7/25/2019  Hospital Length of Stay: 2 days  Code Status: Full Code   Attending Physician: Segundo Ovalle MD   Primary Care Physician: Primary Doctor No  Expected Discharge Date: 7/27/2019  Principal Problem:NSTEMI (non-ST elevated myocardial infarction)    Subjective:       No new subjective & objective note has been filed under this hospital service since the last note was generated.    Assessment and Plan:         Ischemic cardiomyopathy  Ischemic cardiomyopathy.  Continue long-acting beta blockers and Ace inhibitors.  Follow-up in Cardiology.      Coronary artery disease  Known ICM  Also with significant MR on prev echo  Repeat echo showed moderate MR.  Underwent cardiac catheterization yesterday because of non-STEMI.  No culprit lesion found.  Severe triple-vessel disease with patent grafts found as detailed in the cardiac catheterization lab artery.  Continue aspirin, Plavix.  Follow-up in Cardiology Clinic for peripheral artery disease as well as coronary artery disease.    Mixed hyperlipidemia  Cont statin    Hypertension  Cont med rx      VTE Risk Mitigation (From admission, onward)        Ordered     enoxaparin injection 40 mg  Daily      07/26/19 1846     IP VTE HIGH RISK PATIENT  Once      07/25/19 1832     Place CHAPITO hose  Until discontinued      07/25/19 1832     Place sequential compression device  Until discontinued      07/25/19 1832          Chdawick Ramirez MD  Cardiology  [facility]

## 2019-07-27 NOTE — PLAN OF CARE
07/27/19 1151   Final Note   Assessment Type Final Discharge Note   Anticipated Discharge Disposition Home   Hospital Follow Up  Appt(s) scheduled? No  (UNABLE (sATURDAY))   Discharge plans and expectations educations in teach back method with documentation complete? Yes   Right Care Referral Info   Post Acute Recommendation No Care

## 2019-07-29 NOTE — DISCHARGE SUMMARY
[facility] Hospital Medicine  Discharge Summary      Patient Name: Abdoul Ochoa  MRN: [id number]  Admission Date: 7/25/2019  Hospital Length of Stay: 2 days  Discharge Date and Time: 7/27/2019  1:35 PM  Attending Physician: Aleena att. providers found   Discharging Provider: Segundo Ovalle MD  Primary Care Provider: Primary Doctor Aleena      HPI:   Abdoul Ochoa is a 54 y.o. male that (in part)  has a past medical history of Coronary artery disease, Hyperlipidemia, Hypertension, MI (myocardial infarction), and Stroke.  has a past surgical history that includes Bypass Graft; Tracheostomy closure (2014); Cardiac surgery (01/2018); and Coronary angioplasty with stent. Presents to [facility] Emergency Department initially complaining of nausea, vomiting, and diarrhea for 3 days.  He denies him hematemesis, melena, or dyspepsia.  He is concerned may be related to his hepatitis which he was being treated for while in [facility] earlier this year.  When last 2 weeks he has had progressively worsening malaise and fatigue.  And then last night he began having chest pain and shortness of breath.  Symptoms were acute, constant, and severe.  He reports having cyanosis last night and experiencing near-syncope.  No report of fever chills.  No cough, hemoptysis, or productive sputum.    March of last year he was evaluated with left heart catheterization but did not undergo percutaneous intervention at that time.  He reports medication and diet compliance.  However he does smoke and has a history of cocaine abuse.    In the emergency department routine laboratory studies, chest x-ray, EKG, cardiac enzymes were obtained.  He had elevated troponin level.  ACS protocol was initiated and Cardiology was consulted.  Initial troponin 1.68 for followed by 1.81 approximately 6 hr later.  Cardiology evaluated the patient.  Detailed plan outlined below.    Hospital medicine has been asked to admit for further  evaluation and treatment.     Procedure(s) (LRB):  ANGIOGRAM, CORONARY, INCLUDING BYPASS GRAFT, WITH LEFT HEART CATHETERIZATION (N/A)      Hospital Course:   53 yo male admitted for NSTEMI. Troponin uptrending. CTA to ro PE wo PE but possible PNA. EKG appears improved from prior EKGs but with some possible lateral ST abnormalities. Resumed home asa/plavix/statin/bb/acei. Started on Lovenox for ACS. Cardiology consulted. TTE noting 30% EF with severe DD. Patient underwent C severe triple-vessel coronary artery disease.  Patent LIMA to LAD, SVG to OM and SVG to diagonal.  No acute culprit lesion identified. Subsequent day patient felt much better. Stable for d/c with CAD and CHF meds. Augmentin ordered for PNA. FU with cardiology clinic in one week.     Consults:   Consults (From admission, onward)        Status Ordering Provider     Inpatient consult to Cardiology  Once     Provider:  Aftab Lemus MD    Completed VAMSHI HWANG          No new Assessment & Plan notes have been filed under this hospital service since the last note was generated.  Service: Hospital Medicine    Final Active Diagnoses:    Diagnosis Date Noted POA    Ischemic cardiomyopathy [I25.5] 07/27/2019 Unknown    Coronary artery disease [I25.10] 01/18/2018 Yes     Chronic    History of CVA (cerebrovascular accident) [Z86.73] 01/18/2018 Not Applicable     Chronic    Anemia of chronic disease [D63.8] 01/18/2018 Yes     Chronic    Hypertension [I10] 01/17/2018 Yes     Chronic    Mixed hyperlipidemia [E78.2] 01/17/2018 Yes     Chronic    Tobacco abuse [Z72.0] 01/17/2018 Yes     Chronic      Problems Resolved During this Admission:    Diagnosis Date Noted Date Resolved POA    PRINCIPAL PROBLEM:  NSTEMI (non-ST elevated myocardial infarction) [I21.4] 07/25/2019 07/27/2019 Yes    Acute on chronic combined systolic and diastolic heart failure [I50.43] 01/17/2018 07/27/2019 Yes    Cocaine abuse [F14.10] 01/17/2018 07/27/2019 Yes      Chronic       Discharged Condition: stable    Disposition: Home or Self Care    Follow Up:  Follow-up Information     Chadwick Ramirez MD. Schedule an appointment as soon as possible for a visit in 1 week.    Specialties:  Cardiology, INTERVENTIONAL CARDIOLOGY  Why:  CALL THE OFFICE ON MONDAY AND ASK FOR AN APPOINTMENT.  EXPLAIN THAT YOU HAD A PROCEDURE.  iF UNABLE TO GET AN APPOINTMENT CALL [phone] FOR ASSISTANCE.  Contact information:  [address]  [address] [facility]  [facility] [phone]                 Patient Instructions:   No discharge procedures on file.    Significant Diagnostic Studies: Labs: All labs within the past 24 hours have been reviewed    Pending Diagnostic Studies:     None         Medications:  Reconciled Home Medications:      Medication List      START taking these medications    amoxicillin-clavulanate 875-125mg 875-125 mg per tablet  Commonly known as:  AUGMENTIN  Take 1 tablet by mouth every 12 (twelve) hours. for 5 days        CHANGE how you take these medications    atorvastatin 80 MG tablet  Commonly known as:  LIPITOR  Take 1 tablet (80 mg total) by mouth once daily.  What changed:    · medication strength  · how much to take     carvedilol 25 MG tablet  Commonly known as:  COREG  Take 1 tablet (25 mg total) by mouth 2 (two) times daily.  What changed:    · medication strength  · how much to take     lisinopril 40 MG tablet  Commonly known as:  PRINIVIL,ZESTRIL  Take 1 tablet (40 mg total) by mouth once daily.  What changed:    · medication strength  · how much to take        CONTINUE taking these medications    aspirin 81 MG Chew  Take 81 mg by mouth once daily.     clopidogrel 75 mg tablet  Commonly known as:  PLAVIX  Take 1 tablet (75 mg total) by mouth once daily.     nitroGLYCERIN 0.4 MG SL tablet  Commonly known as:  NITROSTAT  Place 1 tablet (0.4 mg total) under the tongue every 5 (five) minutes as needed for Chest pain.            Indwelling Lines/Drains at time of  discharge:   Lines/Drains/Airways          None          Time spent on the discharge of patient: > 30 minutes  Patient was seen and examined on the date of discharge and determined to be suitable for discharge.         Segundo Ovalle MD  Department of Hospital Medicine  [facility]

## 2019-07-30 PROBLEM — R07.9 CHEST PAIN: Status: ACTIVE | Noted: 2019-01-01

## 2019-07-30 NOTE — Clinical Note
A dressing is applied to the incision. Dressing applied 30 minutes after dermabond applied. Pressure dressing applied over aquacel dressing.

## 2019-07-31 PROBLEM — N18.30 CKD (CHRONIC KIDNEY DISEASE) STAGE 3, GFR 30-59 ML/MIN: Chronic | Status: ACTIVE | Noted: 2019-01-01

## 2019-07-31 PROBLEM — I47.20 V-TACH: Status: ACTIVE | Noted: 2019-01-01

## 2019-07-31 PROBLEM — R79.89 ELEVATED TROPONIN: Status: ACTIVE | Noted: 2019-01-01

## 2019-07-31 NOTE — PROGRESS NOTES
1230 Pt complaining of abd pain, unclear whether it is chest pain or indigestion; discussed with Dr. Ramirez.  EKG ordered, amiodarone protocol initiated.      1238  Pt complains of 9/10 abd/chest pain; administered nitro tablets x2; pain relieved, pt resting comfortably with eyes closed.    1455  Pt continues to complain of abd discomfort and constipation.  Administered miralax and seen-ducosate.    1750  Called Dalila Kern NP re: pt continues abd distress.  She will place orders to relieve GI issues.  New orders received for soap suds enema and mineral oil enema and miralax.  Held Miralax; leave to PM RN discretion.  Medium sized BM x1 with soap suds.

## 2019-07-31 NOTE — HPI
Mr. Blessing Ochoa is a 54 y.o. male with essential hypertension, hyperlipidemia (.8 Jan 2018), CAD s/p CABG & PCI, CKD stage 3, anemia of chronic disease, and history of CVA who presents to [facility] ED with complaints of chest pain for one day.  The chest pain started at rest yesterday, was midsternal without radiation, and was 9/10 in severity at its worst.  He cannot describe the quality of the pain but says that felt similar to the pain he felt when he had his heart attacks.  He also had some associated palpitations and diaphoresis but denies any fevers, chills, nausea, vomiting, hemoptysis, nor any lower extremity pain or swelling.  He also denies any PND nor orthopnea and hasn't had any recent travel nor sick contacts.  He took on sublingual nitroglycerin which helped transiently but when the pain recurred he decided to seek ED evaluation.  He reports that he is compliant with his medications.      PATIENT WITH PAST MEDICAL HISTORY OF ISCHEMIC CARDIOMYOPATHY, CORONARY ARTERY DISEASE STATUS POST CABG, RECENT ANGIOGRAM WHICH DID NOT SHOW ANY CULPRIT LESION CAME IN BECAUSE OF ON AND OFF CHEST PAIN YESTERDAY.  STATES THAT DUE TO NITROGLYCERINES AND IT GOT RELIEVED BUT THEN CAME BACK SO DECIDED TO COME TO THE EMERGENCY ROOM.  CURRENTLY CHEST PAIN FREE.  TROPONINS HAVE BEEN CHECKED OVERNIGHT AND HAVE BEEN ELEVATED CONSISTENT WITH HIS PRIOR ELEVATIONS.  HIS EKG DOES NOT SHOW ANY ACUTE NEW CHANGES.  DENIES ANY ORTHOPNEA, PND, SWELLING OF FEET.    Results for BLESSING OCHOA (MRN [id number]) as of 7/31/2019 08:56   Ref. Range 7/26/2019 02:03 7/26/2019 08:18 7/30/2019 20:01 7/30/2019 22:32 7/31/2019 04:06   Troponin I Latest Ref Range: 0.000 - 0.026 ng/mL 1.899 (H) 1.781 (H) 1.885 (H) 1.955 (H) 1.821 (H)     ECHO July 2019:    · Moderately decreased left ventricular systolic function. The estimated ejection fraction is 30%  · Moderate concentric left ventricular hypertrophy.  · Grade III (severe) left ventricular  diastolic dysfunction consistent with restrictive physiology.  · Elevated left atrial pressure.  · Mild right ventricular enlargement.  · Moderate left atrial enlargement.  · Mild right atrial enlargement.  · Moderate-to-severe mitral regurgitation.  · Moderate tricuspid regurgitation.  · Mild pulmonic regurgitation.  · Intermediate central venous pressure (8 mm Hg).  · The estimated PA systolic pressure is 35 mm Hg  · Pulmonary hypertension present.    CORONARY ANGIO July 2019:    Description of the findings of the procedure:   1.  Severe triple-vessel coronary artery disease  2.  Patent BARAJAS to LAD, patent SVG to diagonal 1 and patent SVG to circumflex.  No culprit stenosis noted in these grafts  3.  Mid to distal RCA .  Distal RCA fills with collaterals from the left.  Unchanged from prior.     Findings/Key Components:  LVEDP: 19 mmHg        Dominance: Right   LM:  Patent with no significant stenosis  LAD:  Diffusely disease proximal to mid.  Fills with LIMA to LAD and SVG to diagonal 1  LCx:  Native vessel diffusely disease.  Fills with SVG to OM2  RCA:  Proximal 70% stenosis.  Distal .  Fills with collaterals from the left.     Hemostasis:     RFA 5 English sheath.  Manual pressure held in the cath lab for hemostasis     Impression:  Severe triple-vessel coronary artery disease.  Patent LIMA to LAD, SVG to OM and SVG to diagonal.  No acute culprit lesion identified

## 2019-07-31 NOTE — ASSESSMENT & PLAN NOTE
Patient recently underwent LHC five days ago which was revealing for:    1.  Severe triple-vessel coronary artery disease  2.  Patent BARAJAS to LAD, patent SVG to diagonal 1 and patent SVG to circumflex.  No culprit stenosis noted in these grafts  3.  Mid to distal RCA .  Distal RCA fills with collaterals from the left.  Unchanged from prior.    Patient has a troponin of 1.955 which is consistent to recent measurements--it appears to be flat trending.  I personally reviewed the EKG which was significant for normal sinus rhythm with T-wave inversions in the inferolateral leads which appears to be chronic.  He is at high risk for ACS and was admitted to the Observation Unit for serial cardiac markers and Cardiology consultation.  Will obtain serial cardiac markers and place on cardiac monitoring.

## 2019-07-31 NOTE — ASSESSMENT & PLAN NOTE
As previously stated in hospital course vtach X 2 episodes since admission. Discontinued ABX/contributory and consulted to cardiology. Patient with known cardiac disease - amio infusion + cardiac monitoring.

## 2019-07-31 NOTE — CONSULTS
[facility]  Cardiology  Consult Note    Patient Name: Abdoul Ochoa  MRN: [id number]  Admission Date: 7/30/2019  Hospital Length of Stay: 0 days  Code Status: Full Code   Attending Provider: Marce Shah MD   Consulting Provider: Chadwick Ramirez MD  Primary Care Physician: Primary Doctor No  Principal Problem:Acute chest pain    Patient information was obtained from patient and ER records.     Inpatient consult to Cardiology  Consult performed by: Chadwick Ramirez MD  Consult ordered by: Brian Grullon MD        Subjective:     Chief Complaint:  Chest pain     HPI:   Mr. Abdoul Ochoa is a 54 y.o. male with essential hypertension, hyperlipidemia (.8 Jan 2018), CAD s/p CABG & PCI, CKD stage 3, anemia of chronic disease, and history of CVA who presents to [facility] ED with complaints of chest pain for one day.  The chest pain started at rest yesterday, was midsternal without radiation, and was 9/10 in severity at its worst.  He cannot describe the quality of the pain but says that felt similar to the pain he felt when he had his heart attacks.  He also had some associated palpitations and diaphoresis but denies any fevers, chills, nausea, vomiting, hemoptysis, nor any lower extremity pain or swelling.  He also denies any PND nor orthopnea and hasn't had any recent travel nor sick contacts.  He took on sublingual nitroglycerin which helped transiently but when the pain recurred he decided to seek ED evaluation.  He reports that he is compliant with his medications.      PATIENT WITH PAST MEDICAL HISTORY OF ISCHEMIC CARDIOMYOPATHY, CORONARY ARTERY DISEASE STATUS POST CABG, RECENT ANGIOGRAM WHICH DID NOT SHOW ANY CULPRIT LESION CAME IN BECAUSE OF ON AND OFF CHEST PAIN YESTERDAY.  STATES THAT DUE TO NITROGLYCERINES AND IT GOT RELIEVED BUT THEN CAME BACK SO DECIDED TO COME TO THE EMERGENCY ROOM.  CURRENTLY CHEST PAIN FREE.  TROPONINS HAVE BEEN CHECKED OVERNIGHT AND HAVE BEEN ELEVATED CONSISTENT  WITH HIS PRIOR ELEVATIONS.  HIS EKG DOES NOT SHOW ANY ACUTE NEW CHANGES.  DENIES ANY ORTHOPNEA, PND, SWELLING OF FEET.    Results for BLESSING FLORES (MRN [id number]) as of 7/31/2019 08:56   Ref. Range 7/26/2019 02:03 7/26/2019 08:18 7/30/2019 20:01 7/30/2019 22:32 7/31/2019 04:06   Troponin I Latest Ref Range: 0.000 - 0.026 ng/mL 1.899 (H) 1.781 (H) 1.885 (H) 1.955 (H) 1.821 (H)     ECHO July 2019:    · Moderately decreased left ventricular systolic function. The estimated ejection fraction is 30%  · Moderate concentric left ventricular hypertrophy.  · Grade III (severe) left ventricular diastolic dysfunction consistent with restrictive physiology.  · Elevated left atrial pressure.  · Mild right ventricular enlargement.  · Moderate left atrial enlargement.  · Mild right atrial enlargement.  · Moderate-to-severe mitral regurgitation.  · Moderate tricuspid regurgitation.  · Mild pulmonic regurgitation.  · Intermediate central venous pressure (8 mm Hg).  · The estimated PA systolic pressure is 35 mm Hg  · Pulmonary hypertension present.    CORONARY ANGIO July 2019:    Description of the findings of the procedure:   1.  Severe triple-vessel coronary artery disease  2.  Patent BARAJAS to LAD, patent SVG to diagonal 1 and patent SVG to circumflex.  No culprit stenosis noted in these grafts  3.  Mid to distal RCA .  Distal RCA fills with collaterals from the left.  Unchanged from prior.     Findings/Key Components:  LVEDP: 19 mmHg        Dominance: Right   LM:  Patent with no significant stenosis  LAD:  Diffusely disease proximal to mid.  Fills with LIMA to LAD and SVG to diagonal 1  LCx:  Native vessel diffusely disease.  Fills with SVG to OM2  RCA:  Proximal 70% stenosis.  Distal .  Fills with collaterals from the left.     Hemostasis:     RFA 5 [place]ivehi sheath.  Manual pressure held in the cath lab for hemostasis     Impression:  Severe triple-vessel coronary artery disease.  Patent LIMA to LAD, SVG to OM and SVG to  diagonal.  No acute culprit lesion identified        Past Medical History:   Diagnosis Date    Coronary artery disease     Hyperlipidemia     Hypertension     MI (myocardial infarction)     , , , 2018 (total of 4 stents), triple CABG 2018    Stroke 2014       Past Surgical History:   Procedure Laterality Date    ANGIOGRAM, CORONARY, INCLUDING BYPASS GRAFT, WITH LEFT HEART CATHETERIZATION N/A 2019    Performed by Chadwick Ramirez MD at [facility] CATH LAB    BYPASS GRAFT      CARDIAC SURGERY  2018    three vessel CABG    CORONARY ANGIOPLASTY WITH STENT PLACEMENT      4 stents    TRACHEOSTOMY CLOSURE         Review of patient's allergies indicates:  No Known Allergies    No current facility-administered medications on file prior to encounter.      Current Outpatient Medications on File Prior to Encounter   Medication Sig    amoxicillin-clavulanate 875-125mg (AUGMENTIN) 875-125 mg per tablet Take 1 tablet by mouth every 12 (twelve) hours. for 5 days    aspirin 81 MG Chew Take 81 mg by mouth once daily.    atorvastatin (LIPITOR) 80 MG tablet Take 1 tablet (80 mg total) by mouth once daily.    carvedilol (COREG) 25 MG tablet Take 1 tablet (25 mg total) by mouth 2 (two) times daily.    clopidogrel (PLAVIX) 75 mg tablet Take 1 tablet (75 mg total) by mouth once daily.    lisinopril (PRINIVIL,ZESTRIL) 40 MG tablet Take 1 tablet (40 mg total) by mouth once daily.    nitroGLYCERIN (NITROSTAT) 0.4 MG SL tablet Place 1 tablet (0.4 mg total) under the tongue every 5 (five) minutes as needed for Chest pain.     Family History     Problem Relation (Age of Onset)    Heart disease Mother, Father    Hypertension Mother, Father        Tobacco Use    Smoking status: Former Smoker     Types: Cigarettes     Last attempt to quit: 2014     Years since quittin.1    Smokeless tobacco: Never Used   Substance and Sexual Activity    Alcohol use: Not Currently     Frequency: Never      "Comment: beer on the weekends "barely"    Drug use: No    Sexual activity: Yes     Partners: Female     Review of Systems   Constitution: Positive for diaphoresis and malaise/fatigue.   HENT: Negative.    Eyes: Negative.    Cardiovascular: Positive for chest pain.   Respiratory: Negative.    Endocrine: Negative.    Hematologic/Lymphatic: Negative.    Skin: Negative.    Musculoskeletal: Negative.    Gastrointestinal: Negative.    Genitourinary: Negative.    Neurological: Negative.    Psychiatric/Behavioral: Negative.    Allergic/Immunologic: Negative.      Objective:     Vital Signs (Most Recent):  Temp: 97.6 °F (36.4 °C) (07/31/19 0744)  Pulse: (!) 59 (07/31/19 0744)  Resp: 18 (07/31/19 0744)  BP: (!) 151/103 (07/31/19 0744)  SpO2: 97 % (07/31/19 0744) Vital Signs (24h Range):  Temp:  [97.5 °F (36.4 °C)-98 °F (36.7 °C)] 97.6 °F (36.4 °C)  Pulse:  [59-72] 59  Resp:  [18-26] 18  SpO2:  [93 %-100 %] 97 %  BP: (122-151)/() 151/103     Weight: 82.9 kg (182 lb 12.2 oz)  Body mass index is 29.5 kg/m².    SpO2: 97 %  O2 Device (Oxygen Therapy): nasal cannula      Intake/Output Summary (Last 24 hours) at 7/31/2019 0913  Last data filed at 7/31/2019 0300  Gross per 24 hour   Intake 420 ml   Output 700 ml   Net -280 ml       Lines/Drains/Airways     Peripheral Intravenous Line                 Peripheral IV - Single Lumen 07/30/19 1914 18 G Right Forearm less than 1 day                Physical Exam   Constitutional: He is oriented to person, place, and time. He appears well-developed and well-nourished.   HENT:   Head: Normocephalic.   Eyes: Pupils are equal, round, and reactive to light. Conjunctivae are normal.   Neck: Normal range of motion. Neck supple.   Cardiovascular: Normal rate, regular rhythm and normal heart sounds.   Pulmonary/Chest: Effort normal and breath sounds normal.   Abdominal: Soft. Bowel sounds are normal.   Neurological: He is alert and oriented to person, place, and time.   Skin: Skin is warm. "   Vitals reviewed.      Significant Labs:   BMP:   Recent Labs   Lab 07/30/19 2001 07/30/19 2039 07/31/19  0406   GLU 98  --  96     --  138   K 4.0  --  3.8     --  105   CO2 23  --  23   BUN 24*  --  23*   CREATININE 1.6*  --  1.5*   CALCIUM 9.0  --  8.9   MG  --  2.1 2.0   , CMP   Recent Labs   Lab 07/30/19 2001 07/31/19  0406    138   K 4.0 3.8    105   CO2 23 23   GLU 98 96   BUN 24* 23*   CREATININE 1.6* 1.5*   CALCIUM 9.0 8.9   PROT 5.9* 5.9*   ALBUMIN 3.2* 3.1*   BILITOT 1.0 1.1*   ALKPHOS 67 70   AST 35 33   ALT 43 44   ANIONGAP 10 10   ESTGFRAFRICA 55* 59*   EGFRNONAA 47* 51*   , CBC   Recent Labs   Lab 07/30/19 2001 07/31/19 0406   WBC 4.41 4.09   HGB 11.8* 12.1*   HCT 36.7* 38.0*    349   , INR No results for input(s): INR, PROTIME in the last 48 hours., Lipid Panel   Recent Labs   Lab 07/30/19 2232   CHOL 116*   HDL 11*   LDLCALC 90.8   TRIG 71   CHOLHDL 9.5*   , Troponin   Recent Labs   Lab 07/30/19 2001 07/30/19 2232 07/31/19 0406   TROPONINI 1.885* 1.955* 1.821*    and All pertinent lab results from the last 24 hours have been reviewed.    Significant Imaging: EKG: PERSONALLY REVIEWED    Assessment and Plan:     * Acute chest pain  Patient with known triple-vessel disease status post CABG came in with chest pains yesterday.  Currently chest pain-free cart.  Relieved with nitroglycerin.  The patient had a recent angiogram last week which showed no culprit lesion.  Troponins have not risen significantly from the level he had prior to that angiogram.  EKG does not show any acute ischemic changes.  Currently he is chest pain-free.  He has severe triple-vessel disease was with RCA  with lot of small vessel disease which might be causing the recurrent chest pain.  I will add Imdur 30 mg daily to his regimen to be titrated up as needed for relief.  I will follow up with him in Cardiology Clinic for further workup as needed.    Currently no indication to repeat  coronary angiogram.    Continue medical management with aspirin 81 mg daily, Plavix 75 mg daily, aggressive risk factor modifications as well as atorvastatin 80 mg daily.    Ischemic cardiomyopathy  lvef 30%. On coreg and lisinopril. Currently euvolumic. Add spironolactone.     History of CVA (cerebrovascular accident)        Mixed hyperlipidemia  statin    Essential hypertension  Continue current rx. Added imdur 30 mg        VTE Risk Mitigation (From admission, onward)        Ordered     enoxaparin injection 80 mg  Every 12 hours (non-standard times)      07/30/19 4090          Thank you for your consult. I will follow-up with patient. Please contact us if you have any additional questions.    Chadwick Ramirez MD  Cardiology   [facility]

## 2019-07-31 NOTE — ED PROVIDER NOTES
Encounter Date: 7/30/2019    SCRIBE #1 NOTE: I, Nohemy Faith, am scribing for, and in the presence of, Johanna Dodge MD.       History     Chief Complaint   Patient presents with    Chest Pain     since 2030 last night, pt reports NSTEMI last week, took unknown amount of nitros since last night with intermittent relief, given 3nitro with EMS and 1 inch spray, pt took asa at home, reports SOB and vomiting x 1 at home, midsternal and non radiating      54-year-old male with PMHx of HTN, HLD, stroke, and MI (2013, 2015, 2016, 2018, 2019) presents to ED via EMS with complaints of 8/10 constant mid-sternal, non-radiating, chest pain described as feeling similar to past MIs that is worse after taking his Abx, Augmentin, for Hep A. Associated symptoms includes diaphoresis,SOB, palpitations, nausea (yesterday), and vomiting (yesterday). Patient denies leg swelling, dysuria, abdominal pain, or back pain. Patient notes mild relief with nitro paste and ASA. Patient notes being admitted to ED for non-stemi MI 5 days ago. He notes being discharged with symptoms still presents and states that have not resolved since onset.           The history is provided by the patient.     Review of patient's allergies indicates:  No Known Allergies  Past Medical History:   Diagnosis Date    Coronary artery disease     Hyperlipidemia     Hypertension     MI (myocardial infarction)     2013, 2015, 2016, 2018 (total of 4 stents), triple CABG January 2018    Stroke 09/2014     Past Surgical History:   Procedure Laterality Date    ANGIOGRAM, CORONARY, INCLUDING BYPASS GRAFT, WITH LEFT HEART CATHETERIZATION N/A 7/26/2019    Performed by [facility]wick Ramirez MD at [facility] CATH LAB    BYPASS GRAFT      CARDIAC SURGERY  01/2018    three vessel CABG    CORONARY ANGIOPLASTY WITH STENT PLACEMENT      4 stents    TRACHEOSTOMY CLOSURE  2014     Family History   Problem Relation Age of Onset    Heart disease Mother     Hypertension Mother      "Heart disease Father     Hypertension Father      Social History     Tobacco Use    Smoking status: Former Smoker     Types: Cigarettes     Last attempt to quit: 2014     Years since quittin.1    Smokeless tobacco: Never Used   Substance Use Topics    Alcohol use: Not Currently     Frequency: Never     Comment: beer on the weekends "barely"    Drug use: No     Review of Systems   Constitutional: Positive for diaphoresis.   HENT: Negative for congestion.    Eyes: Negative for discharge.   Respiratory: Positive for shortness of breath.    Cardiovascular: Positive for chest pain and palpitations. Negative for leg swelling.   Gastrointestinal: Positive for nausea (resolved) and vomiting (resolved). Negative for abdominal pain.   Genitourinary: Negative for dysuria.   Musculoskeletal: Negative for back pain.   Skin: Negative for rash.   Neurological: Negative for facial asymmetry.   Psychiatric/Behavioral: Negative for confusion.       Physical Exam     Initial Vitals [192]   BP Pulse Resp Temp SpO2   (!) 138/94 63 20 97.7 °F (36.5 °C) 98 %      MAP       --         Physical Exam    Nursing note and vitals reviewed.  Constitutional: He is not diaphoretic. No distress.   HENT:   Head: Normocephalic and atraumatic.   Mouth/Throat: Oropharynx is clear and moist.   Eyes: Conjunctivae and EOM are normal. Pupils are equal, round, and reactive to light. No scleral icterus.   Neck: Normal range of motion. Neck supple. JVD present.   Cardiovascular: Normal rate, regular rhythm and intact distal pulses.   Pulmonary/Chest: Breath sounds normal. No stridor. No respiratory distress.   Abdominal: Soft. Bowel sounds are normal. He exhibits no distension. There is tenderness in the epigastric area. There is no rebound and no guarding.   Musculoskeletal: Normal range of motion. He exhibits no edema or tenderness.   Neurological: He is alert and oriented to person, place, and time. He has normal strength. No " cranial nerve deficit or sensory deficit.   Skin: Skin is warm and dry. No rash noted.   Psychiatric: He has a normal mood and affect.         ED Course   Procedures  Labs Reviewed   CBC W/ AUTO DIFFERENTIAL - Abnormal; Notable for the following components:       Result Value    RBC 4.14 (*)     Hemoglobin 11.8 (*)     Hematocrit 36.7 (*)     RDW 15.8 (*)     Mono% 18.8 (*)     All other components within normal limits   COMPREHENSIVE METABOLIC PANEL - Abnormal; Notable for the following components:    BUN, Bld 24 (*)     Creatinine 1.6 (*)     Total Protein 5.9 (*)     Albumin 3.2 (*)     eGFR if  55 (*)     eGFR if non  47 (*)     All other components within normal limits   TROPONIN I - Abnormal; Notable for the following components:    Troponin I 1.885 (*)     All other components within normal limits   B-TYPE NATRIURETIC PEPTIDE - Abnormal; Notable for the following components:    BNP 3,044 (*)     All other components within normal limits   ALCOHOL,MEDICAL (ETHANOL)   DRUG SCREEN PANEL, URINE EMERGENCY   MAGNESIUM   LIPASE   TROPONIN I     EKG Readings: (Independently Interpreted)   Initial Reading: No STEMI. Rhythm: Normal Sinus Rhythm. Heart Rate: 61. ST Segment Depression: III, AVF and V6. T Waves Flipped: III, AVF, V5, V6, I and AVL. Axis: Normal.       Imaging Results          X-Ray Chest AP Portable (Final result)  Result time 07/30/19 20:06:51    Final result by Sandra Jackman MD (07/30/19 20:06:51)                 Impression:      As above.      Electronically signed by: Sandra Jackman MD  Date:    07/30/2019  Time:    20:06             Narrative:    EXAMINATION:  XR CHEST AP PORTABLE    CLINICAL HISTORY:  Chest Pain;    TECHNIQUE:  Single frontal view of the chest was performed.    COMPARISON:  Recent chest radiograph and CTA chest from 07/25/2019.    FINDINGS:  Heart is enlarged but stable in size.  Postsurgical changes and sternotomy wires are seen.  There is  bilateral perihilar opacities with asymmetric consolidative change again seen within the right upper lobe.  Findings may reflect pulmonary edema versus multifocal pneumonia.  Overall no significant change in cardiopulmonary status from recent radiograph and CTA.  No evidence of pneumothorax or significant pleural effusion.                              X-Rays:   Independently Interpreted Readings:   Chest X-Ray: Increased vascular markings consistent with CHF are present.     Medical Decision Making:   History:   Old Medical Records: I decided to obtain old medical records.  Old Records Summarized: records from previous admission(s).       <> Summary of Records: Recent admission for NSTEMI  Differential Diagnosis:   Differential includes but is not limited to ACS, GERD, gastritis, musculoskeletal pain, or anxiety.  Independently Interpreted Test(s):   I have ordered and independently interpreted X-rays - see prior notes.  I have ordered and independently interpreted EKG Reading(s) - see prior notes  Clinical Tests:   Lab Tests: Ordered and Reviewed  Radiological Study: Ordered and Reviewed  Medical Tests: Ordered and Reviewed  ED Management:  54 year old patient with hx of MI presenting secondary to chest pain. Patient is at higher risk of ACS due to risk factors and HPI with a heart score of 7. Patient has received an aspirin. Troponins, Cxr, ekg, and labs ordered which showed elevated troponin comparable to when patient was discharged.  BNP significantly elevated over 3000 however patient is not hypoxic, tachycardic or tachypneic.    [email or web address]    Also considered but less likely:     PE: normal rate, no sob/recent immovilization/surgery/travel/family history  Pneumonia: chest xray negative. No fever or leukoscytosis. No cough and lungs non consistent with pna  Tamponade: unlikely due to chest xray and ekg  STEMI: No STEMI on ekg  Dissection: equal pulses bilaterally and  no ripping chest pain to the back  Esophageal rupture: no dysphagia or vomiting and chest xray negative for mediastinal air  Arrhythmia: no arrhythmia on ekg  Pneumothorax: bilateral breath sounds and no signs of pneumothorax on chest xray     Patient has remained hemodynamically stable in the emergency department.  Patient is to be placed in observation for serial troponins to rule out ACS.  Cardiology consultation has been placed.  This chart was completed using dictation software, as a result there may be some transcription errors.     Additional MDM:   Heart Score:    History:          Moderately suspicious.  ECG:             Nonspecific repolarisation disturbance  Age:               45-65 years  Risk factors: >= 3 risk factors or history of atherosclerotic disease  Troponin:       >2x normal limit  Final Score: 7             Scribe Attestation:   Scribe #1: I performed the above scribed service and the documentation accurately describes the services I performed. I attest to the accuracy of the note.               Clinical Impression:       ICD-10-CM ICD-9-CM   1. Chest pain R07.9 786.50         Disposition:   Disposition: Placed in Observation  Condition: Fair           Scribe attestation: I, Johanna Dodge , personally performed the services described in this documentation. All medical record entries made by the scribe were at my direction and in my presence.  I have reviewed the chart and agree that the record reflects my personal performance and is accurate and complete.               Johanna Dodge MD  07/30/19 2194

## 2019-07-31 NOTE — SUBJECTIVE & OBJECTIVE
"Past Medical History:   Diagnosis Date    Coronary artery disease     Hyperlipidemia     Hypertension     MI (myocardial infarction)     , , , 2018 (total of 4 stents), triple CABG 2018    Stroke 2014       Past Surgical History:   Procedure Laterality Date    ANGIOGRAM, CORONARY, INCLUDING BYPASS GRAFT, WITH LEFT HEART CATHETERIZATION N/A 2019    [facility] by [facility] at [facility] CATH LAB    BYPASS GRAFT      CARDIAC SURGERY  2018    three vessel CABG    CORONARY ANGIOPLASTY WITH STENT PLACEMENT      4 stents    TRACHEOSTOMY CLOSURE         Review of patient's allergies indicates:  No Known Allergies    No current facility-administered medications on file prior to encounter.      Current Outpatient Medications on File Prior to Encounter   Medication Sig    amoxicillin-clavulanate 875-125mg (AUGMENTIN) 875-125 mg per tablet Take 1 tablet by mouth every 12 (twelve) hours. for 5 days    aspirin 81 MG Chew Take 81 mg by mouth once daily.    atorvastatin (LIPITOR) 80 MG tablet Take 1 tablet (80 mg total) by mouth once daily.    carvedilol (COREG) 25 MG tablet Take 1 tablet (25 mg total) by mouth 2 (two) times daily.    clopidogrel (PLAVIX) 75 mg tablet Take 1 tablet (75 mg total) by mouth once daily.    lisinopril (PRINIVIL,ZESTRIL) 40 MG tablet Take 1 tablet (40 mg total) by mouth once daily.    nitroGLYCERIN (NITROSTAT) 0.4 MG SL tablet Place 1 tablet (0.4 mg total) under the tongue every 5 (five) minutes as needed for Chest pain.     Family History     Problem Relation (Age of Onset)    Heart disease Mother, Father    Hypertension Mother, Father        Tobacco Use    Smoking status: Former Smoker     Types: Cigarettes     Last attempt to quit: 2014     Years since quittin.1    Smokeless tobacco: Never Used   Substance and Sexual Activity    Alcohol use: Not Currently     Frequency: Never     Comment: beer on the weekends "barely"    Drug use: No "    Sexual activity: Yes     Partners: Female     Review of Systems   Constitutional: Positive for diaphoresis. Negative for activity change, appetite change, chills, fatigue, fever and unexpected weight change.   HENT: Negative.    Eyes: Negative.    Respiratory: Positive for shortness of breath. Negative for cough, chest tightness and wheezing.    Cardiovascular: Positive for chest pain and palpitations. Negative for leg swelling.   Gastrointestinal: Negative for abdominal distention, abdominal pain, blood in stool, constipation, diarrhea, nausea and vomiting.   Genitourinary: Negative for dysuria and hematuria.   Musculoskeletal: Negative.    Skin: Negative.    Neurological: Negative for dizziness, seizures, syncope, weakness and light-headedness.   Psychiatric/Behavioral: Negative.      Objective:     Vital Signs (Most Recent):  Temp: 97.5 °F (36.4 °C) (07/31/19 1109)  Pulse: (!) 59 (07/31/19 1109)  Resp: 18 (07/31/19 1109)  BP: (!) 132/90 (07/31/19 1109)  SpO2: 97 % (07/31/19 1109) Vital Signs (24h Range):  Temp:  [97.5 °F (36.4 °C)-98 °F (36.7 °C)] 97.5 °F (36.4 °C)  Pulse:  [59-72] 59  Resp:  [18-26] 18  SpO2:  [93 %-100 %] 97 %  BP: (122-151)/() 132/90     Weight: 82.9 kg (182 lb 12.2 oz)  Body mass index is 29.5 kg/m².    Physical Exam   Constitutional: He is oriented to person, place, and time. He appears well-developed and well-nourished. No distress.   HENT:   Head: Normocephalic and atraumatic.   Right Ear: External ear normal.   Left Ear: External ear normal.   Nose: Nose normal.   Eyes: Right eye exhibits no discharge. Left eye exhibits no discharge.   Neck: Normal range of motion.   Cardiovascular: Normal rate, regular rhythm, normal heart sounds and intact distal pulses. Exam reveals no friction rub.   No murmur heard.  Pulmonary/Chest: Effort normal and breath sounds normal. No stridor. No respiratory distress. He has no wheezes. He has no rales.   Abdominal: Soft. Bowel sounds are normal. He  exhibits no distension. There is no tenderness. There is no rebound and no guarding.   Musculoskeletal: Normal range of motion. He exhibits no edema.   Neurological: He is alert and oriented to person, place, and time.   Skin: Skin is warm and dry. He is not diaphoretic. No erythema.   Psychiatric: He has a normal mood and affect. His behavior is normal. Judgment and thought content normal.   Nursing note and vitals reviewed.          Significant Labs: All pertinent labs within the past 24 hours have been reviewed.    Significant Imaging: I have reviewed and interpreted all pertinent imaging results/findings within the past 24 hours.

## 2019-07-31 NOTE — SUBJECTIVE & OBJECTIVE
"Past Medical History:   Diagnosis Date    Coronary artery disease     Hyperlipidemia     Hypertension     MI (myocardial infarction)     , , , 2018 (total of 4 stents), triple CABG 2018    Stroke 2014       Past Surgical History:   Procedure Laterality Date    ANGIOGRAM, CORONARY, INCLUDING BYPASS GRAFT, WITH LEFT HEART CATHETERIZATION N/A 2019    [facility]formed by [facility] at [facility] CATH LAB    BYPASS GRAFT      CARDIAC SURGERY  2018    three vessel CABG    CORONARY ANGIOPLASTY WITH STENT PLACEMENT      4 stents    TRACHEOSTOMY CLOSURE         Review of patient's allergies indicates:  No Known Allergies    No current facility-administered medications on file prior to encounter.      Current Outpatient Medications on File Prior to Encounter   Medication Sig    amoxicillin-clavulanate 875-125mg (AUGMENTIN) 875-125 mg per tablet Take 1 tablet by mouth every 12 (twelve) hours. for 5 days    aspirin 81 MG Chew Take 81 mg by mouth once daily.    atorvastatin (LIPITOR) 80 MG tablet Take 1 tablet (80 mg total) by mouth once daily.    carvedilol (COREG) 25 MG tablet Take 1 tablet (25 mg total) by mouth 2 (two) times daily.    clopidogrel (PLAVIX) 75 mg tablet Take 1 tablet (75 mg total) by mouth once daily.    lisinopril (PRINIVIL,ZESTRIL) 40 MG tablet Take 1 tablet (40 mg total) by mouth once daily.    nitroGLYCERIN (NITROSTAT) 0.4 MG SL tablet Place 1 tablet (0.4 mg total) under the tongue every 5 (five) minutes as needed for Chest pain.     Family History     Problem Relation (Age of Onset)    Heart disease Mother, Father    Hypertension Mother, Father        Tobacco Use    Smoking status: Former Smoker     Types: Cigarettes     Last attempt to quit: 2014     Years since quittin.1    Smokeless tobacco: Never Used   Substance and Sexual Activity    Alcohol use: Not Currently     Frequency: Never     Comment: beer on the weekends "barely"    Drug use: No "    Sexual activity: Yes     Partners: Female     Review of Systems   Constitutional: Positive for diaphoresis. Negative for activity change, appetite change, chills, fatigue, fever and unexpected weight change.   HENT: Negative.    Eyes: Negative.    Respiratory: Positive for shortness of breath. Negative for cough, chest tightness and wheezing.    Cardiovascular: Positive for chest pain and palpitations. Negative for leg swelling.   Gastrointestinal: Negative for abdominal distention, abdominal pain, blood in stool, constipation, diarrhea, nausea and vomiting.   Genitourinary: Negative for dysuria and hematuria.   Musculoskeletal: Negative.    Skin: Negative.    Neurological: Negative for dizziness, seizures, syncope, weakness and light-headedness.   Psychiatric/Behavioral: Negative.      Objective:     Vital Signs (Most Recent):  Temp: 98 °F (36.7 °C) (07/30/19 2327)  Pulse: 65 (07/30/19 2327)  Resp: 18 (07/30/19 2327)  BP: (!) 134/95 (07/30/19 2327)  SpO2: 97 % (07/30/19 2327) Vital Signs (24h Range):  Temp:  [97.7 °F (36.5 °C)-98 °F (36.7 °C)] 98 °F (36.7 °C)  Pulse:  [63-72] 65  Resp:  [18-26] 18  SpO2:  [96 %-100 %] 97 %  BP: (122-144)/(80-97) 134/95     Weight: 82.9 kg (182 lb 12.2 oz)  Body mass index is 29.5 kg/m².    Physical Exam   Constitutional: He is oriented to person, place, and time. He appears well-developed and well-nourished. No distress.   HENT:   Head: Normocephalic and atraumatic.   Right Ear: External ear normal.   Left Ear: External ear normal.   Nose: Nose normal.   Eyes: Right eye exhibits no discharge. Left eye exhibits no discharge.   Neck: Normal range of motion.   Cardiovascular: Normal rate, regular rhythm, normal heart sounds and intact distal pulses. Exam reveals no friction rub.   No murmur heard.  Pulmonary/Chest: Effort normal and breath sounds normal. No stridor. No respiratory distress. He has no wheezes. He has no rales.   Abdominal: Soft. Bowel sounds are normal. He exhibits  no distension. There is no tenderness. There is no rebound and no guarding.   Musculoskeletal: Normal range of motion. He exhibits no edema.   Neurological: He is alert and oriented to person, place, and time.   Skin: Skin is warm and dry. He is not diaphoretic. No erythema.   Psychiatric: He has a normal mood and affect. His behavior is normal. Judgment and thought content normal.   Nursing note and vitals reviewed.          Significant Labs: All pertinent labs within the past 24 hours have been reviewed.    Significant Imaging: I have reviewed and interpreted all pertinent imaging results/findings within the past 24 hours.

## 2019-07-31 NOTE — H&P
[facility] Medicine  History & Physical    Patient Name: Abdoul Ochoa  MRN: [id number]  Admission Date: 7/30/2019  Attending Physician: Marce Shah MD   Primary Care Provider: Primary Doctor No         Patient information was obtained from patient.     Subjective:     Principal Problem:Acute chest pain    Chief Complaint:  Chest pain for one day.    HPI: Mr. Abdoul Ochoa is a 54 y.o. male with essential hypertension, hyperlipidemia (.8 Jan 2018), CAD s/p CABG & PCI, CKD stage 3, anemia of chronic disease, and history of CVA who presents to [facility] ED with complaints of chest pain for one day.  The chest pain started at rest yesterday, was midsternal without radiation, and was 9/10 in severity at its worst.  He cannot describe the quality of the pain but says that felt similar to the pain he felt when he had his heart attacks.  He also had some associated palpitations and diaphoresis but denies any fevers, chills, nausea, vomiting, hemoptysis, nor any lower extremity pain or swelling.  He also denies any PND nor orthopnea and hasn't had any recent travel nor sick contacts.  He took on sublingual nitroglycerin which helped transiently but when the pain recurred he decided to seek ED evaluation.  He reports that he is compliant with his medications.      Chart Review:  Previous Hospitalizations  Date Hospital Diagnosis   Jul 25, 2019 [facility] NSTEMI s/p C with no culprit vessel identified   Jan 2018 [facility] NSTEMI s/p C with no culprit vessel identified     Past Medical History:   Diagnosis Date    Coronary artery disease     Hyperlipidemia     Hypertension     MI (myocardial infarction)     2013, 2015, 2016, 2018 (total of 4 stents), triple CABG January 2018    Stroke 09/2014       Past Surgical History:   Procedure Laterality Date    ANGIOGRAM, CORONARY, INCLUDING BYPASS GRAFT, WITH LEFT HEART CATHETERIZATION N/A 7/26/2019    Performed by Chadwick [facility] at [facility]  "CATH LAB    BYPASS GRAFT      CARDIAC SURGERY  2018    three vessel CABG    CORONARY ANGIOPLASTY WITH STENT PLACEMENT      4 stents    TRACHEOSTOMY CLOSURE         Review of patient's allergies indicates:  No Known Allergies    No current facility-administered medications on file prior to encounter.      Current Outpatient Medications on File Prior to Encounter   Medication Sig    amoxicillin-clavulanate 875-125mg (AUGMENTIN) 875-125 mg per tablet Take 1 tablet by mouth every 12 (twelve) hours. for 5 days    aspirin 81 MG Chew Take 81 mg by mouth once daily.    atorvastatin (LIPITOR) 80 MG tablet Take 1 tablet (80 mg total) by mouth once daily.    carvedilol (COREG) 25 MG tablet Take 1 tablet (25 mg total) by mouth 2 (two) times daily.    clopidogrel (PLAVIX) 75 mg tablet Take 1 tablet (75 mg total) by mouth once daily.    lisinopril (PRINIVIL,ZESTRIL) 40 MG tablet Take 1 tablet (40 mg total) by mouth once daily.    nitroGLYCERIN (NITROSTAT) 0.4 MG SL tablet Place 1 tablet (0.4 mg total) under the tongue every 5 (five) minutes as needed for Chest pain.     Family History     Problem Relation (Age of Onset)    Heart disease Mother, Father    Hypertension Mother, Father        Tobacco Use    Smoking status: Former Smoker     Types: Cigarettes     Last attempt to quit: 2014     Years since quittin.1    Smokeless tobacco: Never Used   Substance and Sexual Activity    Alcohol use: Not Currently     Frequency: Never     Comment: beer on the weekends "barely"    Drug use: No    Sexual activity: Yes     Partners: Female     Review of Systems   Constitutional: Positive for diaphoresis. Negative for activity change, appetite change, chills, fatigue, fever and unexpected weight change.   HENT: Negative.    Eyes: Negative.    Respiratory: Positive for shortness of breath. Negative for cough, chest tightness and wheezing.    Cardiovascular: Positive for chest pain and palpitations. Negative for " leg swelling.   Gastrointestinal: Negative for abdominal distention, abdominal pain, blood in stool, constipation, diarrhea, nausea and vomiting.   Genitourinary: Negative for dysuria and hematuria.   Musculoskeletal: Negative.    Skin: Negative.    Neurological: Negative for dizziness, seizures, syncope, weakness and light-headedness.   Psychiatric/Behavioral: Negative.      Objective:     Vital Signs (Most Recent):  Temp: 98 °F (36.7 °C) (07/30/19 2327)  Pulse: 65 (07/30/19 2327)  Resp: 18 (07/30/19 2327)  BP: (!) 134/95 (07/30/19 2327)  SpO2: 97 % (07/30/19 2327) Vital Signs (24h Range):  Temp:  [97.7 °F (36.5 °C)-98 °F (36.7 °C)] 98 °F (36.7 °C)  Pulse:  [63-72] 65  Resp:  [18-26] 18  SpO2:  [96 %-100 %] 97 %  BP: (122-144)/(80-97) 134/95     Weight: 82.9 kg (182 lb 12.2 oz)  Body mass index is 29.5 kg/m².    Physical Exam   Constitutional: He is oriented to person, place, and time. He appears well-developed and well-nourished. No distress.   HENT:   Head: Normocephalic and atraumatic.   Right Ear: External ear normal.   Left Ear: External ear normal.   Nose: Nose normal.   Eyes: Right eye exhibits no discharge. Left eye exhibits no discharge.   Neck: Normal range of motion.   Cardiovascular: Normal rate, regular rhythm, normal heart sounds and intact distal pulses. Exam reveals no friction rub.   No murmur heard.  Pulmonary/Chest: Effort normal and breath sounds normal. No stridor. No respiratory distress. He has no wheezes. He has no rales.   Abdominal: Soft. Bowel sounds are normal. He exhibits no distension. There is no tenderness. There is no rebound and no guarding.   Musculoskeletal: Normal range of motion. He exhibits no edema.   Neurological: He is alert and oriented to person, place, and time.   Skin: Skin is warm and dry. He is not diaphoretic. No erythema.   Psychiatric: He has a normal mood and affect. His behavior is normal. Judgment and thought content normal.   Nursing note and vitals  reviewed.          Significant Labs: All pertinent labs within the past 24 hours have been reviewed.    Significant Imaging: I have reviewed and interpreted all pertinent imaging results/findings within the past 24 hours.    Assessment/Plan:     * Acute chest pain  Patient recently underwent LHC five days ago which was revealing for:    1.  Severe triple-vessel coronary artery disease  2.  Patent BARAJAS to LAD, patent SVG to diagonal 1 and patent SVG to circumflex.  No culprit stenosis noted in these grafts  3.  Mid to distal RCA .  Distal RCA fills with collaterals from the left.  Unchanged from prior.    Patient has a troponin of 1.955 which is consistent to recent measurements--it appears to be flat trending.  I personally reviewed the EKG which was significant for normal sinus rhythm with T-wave inversions in the inferolateral leads which appears to be chronic.  He is at high risk for ACS and was admitted to the Observation Unit for serial cardiac markers and Cardiology consultation.  Will obtain serial cardiac markers and place on cardiac monitoring.    Essential hypertension  Patient's blood pressure is fair-controlled; will continue home regimen of carvedilol and lisinopril, and provide as-needed clonidine.    Mixed hyperlipidemia  Poorly controlled; will continue his home regimen of atorvastatin.    Coronary artery disease  As addressed above.    CKD stage 3  His renal function appears to be at his baseline; will continue to monitor his urine output.    Anemia of chronic disease  The patient's H/H is stable and consistent with previous laboratory measurements, and the patient exhibits no signs or symptoms of acute bleeding; there is no indication for transfusion.  Will continue to monitor.    History of CVA (cerebrovascular accident)  Stable; will continue his home regimen of aspirin and atorvastatin.    VTE Risk Mitigation (From admission, onward)        Ordered     enoxaparin injection 80 mg  Every 12  hours (non-standard times)      07/30/19 5736             Brian Grullon M.D.  Staff [facility] Hospitalist  Department of [facility] Medicine  [facility]  Pager: [phone]          N.B.: Portions of this note was dictated using M*Modal Fluency Direct--there may be voice recognition errors occasionally missed on review.

## 2019-07-31 NOTE — NURSING TRANSFER
Nursing Transfer Note      07/30/2019     Transfer From: ED TO: 3285B    Transfer via wheelchair    Transfer with cardiac monitoring    Transported by transport personnel    Medicines sent: none    Chart send with patient: Yes    Notified: family    Patient reassessed on July 30, 2019 at 2340    Upon arrival to floor patient able to walk from wheelchair to the bed with some generalized weakness noted. Cardiac monitoring in progress, vital signs obtained and found in flow sheets with complete patient assessment. Skin dry and intact with scarring noted to bilateral legs and a 18g RFA PIV noted saline locked. Complaints of mid-sternal chest pain noted and to be treated when able. NAD noted. Cardiology consulted. Plan to trend troponins, monitor and manage pain, and maintain npo status after midnight for possible cardiac testing in the am. Patient updated on plan of care and verbalized understanding. Call light and urinal in reach and patient instructed to inform the nurse if anything is needed.

## 2019-07-31 NOTE — NURSING
Pt had a run of Vtach. Converted back to SR spontaniously. Pt asymptomatic.  Dalila GOMEZ NP, notified.

## 2019-07-31 NOTE — ASSESSMENT & PLAN NOTE
Patient with known triple-vessel disease status post CABG came in with chest pains yesterday.  Currently chest pain-free cart.  Relieved with nitroglycerin.  The patient had a recent angiogram last week which showed no culprit lesion.  Troponins have not risen significantly from the level he had prior to that angiogram.  EKG does not show any acute ischemic changes.  Currently he is chest pain-free.  He has severe triple-vessel disease was with RCA  with lot of small vessel disease which might be causing the recurrent chest pain.  I will add Imdur 30 mg daily to his regimen to be titrated up as needed for relief.  I will follow up with him in Cardiology Clinic for further workup as needed.    Currently no indication to repeat coronary angiogram.    Continue medical management with aspirin 81 mg daily, Plavix 75 mg daily, aggressive risk factor modifications as well as atorvastatin 80 mg daily.

## 2019-07-31 NOTE — SUBJECTIVE & OBJECTIVE
"Past Medical History:   Diagnosis Date    Coronary artery disease     Hyperlipidemia     Hypertension     MI (myocardial infarction)     , , , 2018 (total of 4 stents), triple CABG 2018    Stroke 2014       Past Surgical History:   Procedure Laterality Date    ANGIOGRAM, CORONARY, INCLUDING BYPASS GRAFT, WITH LEFT HEART CATHETERIZATION N/A 2019    Performed by [facility] at [facility] CATH LAB    BYPASS GRAFT      CARDIAC SURGERY  2018    three vessel CABG    CORONARY ANGIOPLASTY WITH STENT PLACEMENT      4 stents    TRACHEOSTOMY CLOSURE         Review of patient's allergies indicates:  No Known Allergies    No current facility-administered medications on file prior to encounter.      Current Outpatient Medications on File Prior to Encounter   Medication Sig    amoxicillin-clavulanate 875-125mg (AUGMENTIN) 875-125 mg per tablet Take 1 tablet by mouth every 12 (twelve) hours. for 5 days    aspirin 81 MG Chew Take 81 mg by mouth once daily.    atorvastatin (LIPITOR) 80 MG tablet Take 1 tablet (80 mg total) by mouth once daily.    carvedilol (COREG) 25 MG tablet Take 1 tablet (25 mg total) by mouth 2 (two) times daily.    clopidogrel (PLAVIX) 75 mg tablet Take 1 tablet (75 mg total) by mouth once daily.    lisinopril (PRINIVIL,ZESTRIL) 40 MG tablet Take 1 tablet (40 mg total) by mouth once daily.    nitroGLYCERIN (NITROSTAT) 0.4 MG SL tablet Place 1 tablet (0.4 mg total) under the tongue every 5 (five) minutes as needed for Chest pain.     Family History     Problem Relation (Age of Onset)    Heart disease Mother, Father    Hypertension Mother, Father        Tobacco Use    Smoking status: Former Smoker     Types: Cigarettes     Last attempt to quit: 2014     Years since quittin.1    Smokeless tobacco: Never Used   Substance and Sexual Activity    Alcohol use: Not Currently     Frequency: Never     Comment: beer on the weekends "barely"    Drug use: No "    Sexual activity: Yes     Partners: Female     Review of Systems   Constitution: Positive for diaphoresis and malaise/fatigue.   HENT: Negative.    Eyes: Negative.    Cardiovascular: Positive for chest pain.   Respiratory: Negative.    Endocrine: Negative.    Hematologic/Lymphatic: Negative.    Skin: Negative.    Musculoskeletal: Negative.    Gastrointestinal: Negative.    Genitourinary: Negative.    Neurological: Negative.    Psychiatric/Behavioral: Negative.    Allergic/Immunologic: Negative.      Objective:     Vital Signs (Most Recent):  Temp: 97.6 °F (36.4 °C) (07/31/19 0744)  Pulse: (!) 59 (07/31/19 0744)  Resp: 18 (07/31/19 0744)  BP: (!) 151/103 (07/31/19 0744)  SpO2: 97 % (07/31/19 0744) Vital Signs (24h Range):  Temp:  [97.5 °F (36.4 °C)-98 °F (36.7 °C)] 97.6 °F (36.4 °C)  Pulse:  [59-72] 59  Resp:  [18-26] 18  SpO2:  [93 %-100 %] 97 %  BP: (122-151)/() 151/103     Weight: 82.9 kg (182 lb 12.2 oz)  Body mass index is 29.5 kg/m².    SpO2: 97 %  O2 Device (Oxygen Therapy): nasal cannula      Intake/Output Summary (Last 24 hours) at 7/31/2019 0913  Last data filed at 7/31/2019 0300  Gross per 24 hour   Intake 420 ml   Output 700 ml   Net -280 ml       Lines/Drains/Airways     Peripheral Intravenous Line                 Peripheral IV - Single Lumen 07/30/19 1914 18 G Right Forearm less than 1 day                Physical Exam   Constitutional: He is oriented to person, place, and time. He appears well-developed and well-nourished.   HENT:   Head: Normocephalic.   Eyes: Pupils are equal, round, and reactive to light. Conjunctivae are normal.   Neck: Normal range of motion. Neck supple.   Cardiovascular: Normal rate, regular rhythm and normal heart sounds.   Pulmonary/Chest: Effort normal and breath sounds normal.   Abdominal: Soft. Bowel sounds are normal.   Neurological: He is alert and oriented to person, place, and time.   Skin: Skin is warm.   Vitals reviewed.      Significant Labs:   BMP:   Recent  Labs   Lab 07/30/19 2001 07/30/19 2039 07/31/19  0406   GLU 98  --  96     --  138   K 4.0  --  3.8     --  105   CO2 23  --  23   BUN 24*  --  23*   CREATININE 1.6*  --  1.5*   CALCIUM 9.0  --  8.9   MG  --  2.1 2.0   , CMP   Recent Labs   Lab 07/30/19 2001 07/31/19  0406    138   K 4.0 3.8    105   CO2 23 23   GLU 98 96   BUN 24* 23*   CREATININE 1.6* 1.5*   CALCIUM 9.0 8.9   PROT 5.9* 5.9*   ALBUMIN 3.2* 3.1*   BILITOT 1.0 1.1*   ALKPHOS 67 70   AST 35 33   ALT 43 44   ANIONGAP 10 10   ESTGFRAFRICA 55* 59*   EGFRNONAA 47* 51*   , CBC   Recent Labs   Lab 07/30/19 2001 07/31/19 0406   WBC 4.41 4.09   HGB 11.8* 12.1*   HCT 36.7* 38.0*    349   , INR No results for input(s): INR, PROTIME in the last 48 hours., Lipid Panel   Recent Labs   Lab 07/30/19 2232   CHOL 116*   HDL 11*   LDLCALC 90.8   TRIG 71   CHOLHDL 9.5*   , Troponin   Recent Labs   Lab 07/30/19 2001 07/30/19 2232 07/31/19  0406   TROPONINI 1.885* 1.955* 1.821*    and All pertinent lab results from the last 24 hours have been reviewed.    Significant Imaging: EKG: PERSONALLY REVIEWED

## 2019-07-31 NOTE — ASSESSMENT & PLAN NOTE
Patient's blood pressure is fair-controlled; will continue home regimen of carvedilol and lisinopril, and provide as-needed clonidine.

## 2019-07-31 NOTE — ED TRIAGE NOTES
"Arrived via EMS c/o mid-sternal CP. Described as "like I was having a heart attack". Says CP started last night. Accompanying symptoms OLIVO, intermittent nausea, and diaphoresis at home.  Reports taking 2 NTG SL and 81mg ASA at home and 2 NTG, 325mg ASA, and Nitropaste with EMS. Denies any changes in urination or noticed fluid retention/edema.   "

## 2019-07-31 NOTE — HOSPITAL COURSE
Mr. Ochoa was transferred 8/2 due to NSTEMI with VTach for EP evaluation. On arrival he had CP that was relieved with SL NTG, he attributed chest pain to increased anxiety and PRN regimen initiated. Home ISMN up titrated due to anginal pain. He described non specific epigastric pain on 8/4 >> now resolved; amylase and lipase WNL, abd US without acute pathology however noted nonspecific diffuse gallbladder wall thickening.  EP consulted, s/p ICD placement 8/5. Continue amiodarone, ASA, statin, carvedilol, PO furosemide, lisinopril, and aldactone.     Disposition plans: home, no home needs identified. Will need outpt follow up with Cardiology and EP.

## 2019-07-31 NOTE — PLAN OF CARE
07/31/19 1546   Discharge Assessment   Assessment Type Discharge Planning Assessment   Confirmed/corrected address and phone number on facesheet? Yes   Assessment information obtained from? Patient   Prior to hospitilization cognitive status: Alert/Oriented   Prior to hospitalization functional status: Independent   Current cognitive status: Alert/Oriented   Current Functional Status: Independent   Facility Arrived From: home   Lives With sibling(s)   Able to Return to Prior Arrangements yes   Is patient able to care for self after discharge? Unable to determine at this time (comments)   Who are your caregiver(s) and their phone number(s)? diana [phone]   Patient's perception of discharge disposition home or selfcare   Readmission Within the Last 30 Days no previous admission in last 30 days   Patient currently being followed by outpatient case management? No   Patient currently receives any other outside agency services? No   Equipment Currently Used at Home none   Do you have any problems affording any of your prescribed medications? No   Is the patient taking medications as prescribed? yes   Does the patient have transportation home? Yes   Transportation Anticipated family or friend will provide   Dialysis Name and Scheduled days N/A   Does the patient receive services at the Coumadin Clinic? No   Discharge Plan A Home with family   Discharge Plan B Home with family   DME Needed Upon Discharge  none   Patient/Family in Agreement with Plan yes         [id number] - [place] - [address] AT [address] [place] [address]  Phone: [phone] Fax: [phone]

## 2019-07-31 NOTE — PROGRESS NOTES
[facility] Hospital Medicine  Progress Note    Patient Name: Abdoul Ochoa  MRN: [id number]  Patient Class: IP- Inpatient   Admission Date: 7/30/2019  Length of Stay: 0 days  Attending Physician: Marce Shah MD  Primary Care Provider: Primary Doctor No        Subjective:     Principal Problem:V-tach      HPI:  Mr. Abdoul Ochoa is a 54 y.o. male with essential hypertension, hyperlipidemia (.8 Jan 2018), CAD s/p CABG & PCI, CKD stage 3, anemia of chronic disease, and history of CVA who presents to [facility] ED with complaints of chest pain for one day.  The chest pain started at rest yesterday, was midsternal without radiation, and was 9/10 in severity at its worst.  He cannot describe the quality of the pain but says that felt similar to the pain he felt when he had his heart attacks.  He also had some associated palpitations and diaphoresis but denies any fevers, chills, nausea, vomiting, hemoptysis, nor any lower extremity pain or swelling.  He also denies any PND nor orthopnea and hasn't had any recent travel nor sick contacts.  He took on sublingual nitroglycerin which helped transiently but when the pain recurred he decided to seek ED evaluation.  He reports that he is compliant with his medications.      Overview/Hospital Course:  Very pleasant 54 year old AAM with history of CAD CABG in 01/2019 placed in observation for chest pain - had a run of tachy overnight that was not sustained; converted spontaneously. At around 1120 am this morning - patient had >30 beat run of vtach he was not symptomatic.      He reports that he had been having treatment for hep a outpatient by injections once monthly. Since his last hospitalization - he was referred to [facility] and placed on oral Augmentin??? He states that he feels the palpitations and gets sweaty shortly after each dose of Augmentin.     Additionally, the patient does have severe triple vessel disease according to Cards note. Dr. Ramirez  notified of arrhythmia, - transfer to intensive care for amio infusion    Past Medical History:   Diagnosis Date    Coronary artery disease     Hyperlipidemia     Hypertension     MI (myocardial infarction)     , , , 2018 (total of 4 stents), triple CABG 2018    Stroke 2014       Past Surgical History:   Procedure Laterality Date    ANGIOGRAM, CORONARY, INCLUDING BYPASS GRAFT, WITH LEFT HEART CATHETERIZATION N/A 2019    Performed by Chadwick Ramirez MD at [facility] CATH LAB    BYPASS GRAFT      CARDIAC SURGERY  2018    three vessel CABG    CORONARY ANGIOPLASTY WITH STENT PLACEMENT      4 stents    TRACHEOSTOMY CLOSURE         Review of patient's allergies indicates:  No Known Allergies    No current facility-administered medications on file prior to encounter.      Current Outpatient Medications on File Prior to Encounter   Medication Sig    amoxicillin-clavulanate 875-125mg (AUGMENTIN) 875-125 mg per tablet Take 1 tablet by mouth every 12 (twelve) hours. for 5 days    aspirin 81 MG Chew Take 81 mg by mouth once daily.    atorvastatin (LIPITOR) 80 MG tablet Take 1 tablet (80 mg total) by mouth once daily.    carvedilol (COREG) 25 MG tablet Take 1 tablet (25 mg total) by mouth 2 (two) times daily.    clopidogrel (PLAVIX) 75 mg tablet Take 1 tablet (75 mg total) by mouth once daily.    lisinopril (PRINIVIL,ZESTRIL) 40 MG tablet Take 1 tablet (40 mg total) by mouth once daily.    nitroGLYCERIN (NITROSTAT) 0.4 MG SL tablet Place 1 tablet (0.4 mg total) under the tongue every 5 (five) minutes as needed for Chest pain.     Family History     Problem Relation (Age of Onset)    Heart disease Mother, Father    Hypertension Mother, Father        Tobacco Use    Smoking status: Former Smoker     Types: Cigarettes     Last attempt to quit: 2014     Years since quittin.1    Smokeless tobacco: Never Used   Substance and Sexual Activity    Alcohol use: Not Currently      "Frequency: Never     Comment: beer on the weekends "barely"    Drug use: No    Sexual activity: Yes     Partners: Female     Review of Systems   Constitutional: Positive for diaphoresis. Negative for activity change, appetite change, chills, fatigue, fever and unexpected weight change.   HENT: Negative.    Eyes: Negative.    Respiratory: Positive for shortness of breath. Negative for cough, chest tightness and wheezing.    Cardiovascular: Positive for chest pain and palpitations. Negative for leg swelling.   Gastrointestinal: Negative for abdominal distention, abdominal pain, blood in stool, constipation, diarrhea, nausea and vomiting.   Genitourinary: Negative for dysuria and hematuria.   Musculoskeletal: Negative.    Skin: Negative.    Neurological: Negative for dizziness, seizures, syncope, weakness and light-headedness.   Psychiatric/Behavioral: Negative.      Objective:     Vital Signs (Most Recent):  Temp: 97.5 °F (36.4 °C) (07/31/19 1109)  Pulse: (!) 59 (07/31/19 1109)  Resp: 18 (07/31/19 1109)  BP: (!) 132/90 (07/31/19 1109)  SpO2: 97 % (07/31/19 1109) Vital Signs (24h Range):  Temp:  [97.5 °F (36.4 °C)-98 °F (36.7 °C)] 97.5 °F (36.4 °C)  Pulse:  [59-72] 59  Resp:  [18-26] 18  SpO2:  [93 %-100 %] 97 %  BP: (122-151)/() 132/90     Weight: 82.9 kg (182 lb 12.2 oz)  Body mass index is 29.5 kg/m².    Physical Exam   Constitutional: He is oriented to person, place, and time. He appears well-developed and well-nourished. No distress.   HENT:   Head: Normocephalic and atraumatic.   Right Ear: External ear normal.   Left Ear: External ear normal.   Nose: Nose normal.   Eyes: Right eye exhibits no discharge. Left eye exhibits no discharge.   Neck: Normal range of motion.   Cardiovascular: Normal rate, regular rhythm, normal heart sounds and intact distal pulses. Exam reveals no friction rub.   No murmur heard.  Pulmonary/Chest: Effort normal and breath sounds normal. No stridor. No respiratory distress. He " has no wheezes. He has no rales.   Abdominal: Soft. Bowel sounds are normal. He exhibits no distension. There is no tenderness. There is no rebound and no guarding.   Musculoskeletal: Normal range of motion. He exhibits no edema.   Neurological: He is alert and oriented to person, place, and time.   Skin: Skin is warm and dry. He is not diaphoretic. No erythema.   Psychiatric: He has a normal mood and affect. His behavior is normal. Judgment and thought content normal.   Nursing note and vitals reviewed.          Significant Labs: All pertinent labs within the past 24 hours have been reviewed.    Significant Imaging: I have reviewed and interpreted all pertinent imaging results/findings within the past 24 hours.      Assessment/Plan:      * V-tach  As previously stated in hospital course vtach X 2 episodes since admission. Discontinued ABX/contributory and consulted to cardiology. Patient with known cardiac disease - amio infusion + cardiac monitoring.      CKD stage 3  His renal function appears to be at his baseline; will continue to monitor his urine output.    Acute chest pain  Patient recently underwent LHC five days ago which was revealing for:    1.  Severe triple-vessel coronary artery disease  2.  Patent BARAJAS to LAD, patent SVG to diagonal 1 and patent SVG to circumflex.  No culprit stenosis noted in these grafts  3.  Mid to distal RCA .  Distal RCA fills with collaterals from the left.  Unchanged from prior.    Patient has a troponin of 1.955 which is consistent to recent measurements--it appears to be flat trending.  I personally reviewed the EKG which was significant for normal sinus rhythm with T-wave inversions in the inferolateral leads which appears to be chronic.  He is at high risk for ACS and was admitted to the Observation Unit for serial cardiac markers and Cardiology consultation.  Will obtain serial cardiac markers and place on cardiac monitoring.    Anemia of chronic disease  The patient's  H/H is stable and consistent with previous laboratory measurements, and the patient exhibits no signs or symptoms of acute bleeding; there is no indication for transfusion.  Will continue to monitor.    Coronary artery disease  As addressed above.    History of CVA (cerebrovascular accident)  Stable; will continue his home regimen of aspirin and atorvastatin.    Mixed hyperlipidemia  Poorly controlled; will continue his home regimen of atorvastatin.    Essential hypertension  Patient's blood pressure is fair-controlled; will continue home regimen of carvedilol and lisinopril, and provide as-needed clonidine.    VTE Risk Mitigation (From admission, onward)        Ordered     enoxaparin injection 80 mg  Every 12 hours (non-standard times)      07/30/19 5828          Critical care time spent on the evaluation and treatment of severe organ dysfunction, review of pertinent labs and imaging studies, discussions with consulting providers and discussions with patient/family: 45 minutes.      NAHOMI Delarosa, FNP-C  Hospitalist - Department of Hospital Medicine  [address], [place] [address]  Office [phone]; Pager [phone]

## 2019-08-01 PROBLEM — I25.5 ISCHEMIC CARDIOMYOPATHY: Chronic | Status: ACTIVE | Noted: 2019-01-01

## 2019-08-01 NOTE — ASSESSMENT & PLAN NOTE
lvef 30%. On coreg and lisinopril. Currently euvolumic. Added spironolactone.  Goal to maximize medical therapy.

## 2019-08-01 NOTE — PROGRESS NOTES
[facility]  Cardiology  Progress Note    Patient Name: Abdoul Ochoa  MRN: [id number]  Admission Date: 7/30/2019  Hospital Length of Stay: 1 days  Code Status: Full Code   Attending Physician: Marce Shah MD   Primary Care Physician: Primary Doctor No  Expected Discharge Date:   Principal Problem:V-tach    Subjective:       Interval History:  Overnight no chest pains.  Check tele no further episodes of V-tach.  Yesterday had prolonged nonsustained ventricular tachycardia, 1 episode.  Was transferred to ICU and put on amiodarone drip.  Since has he has been on the amiodarone drip no episodes of ventricular tachycardia or nonsustained ventricular tachycardia have been noted.  He had a few couplets of PVCs.  Denies any chest pains.  Did have constipation yesterday which is better today as per the patient.    Review of Systems   Constitution: Negative.   HENT: Negative.    Eyes: Negative.    Cardiovascular: Negative.    Respiratory: Negative.    Endocrine: Negative.    Hematologic/Lymphatic: Negative.    Skin: Negative.    Musculoskeletal: Negative.    Gastrointestinal: Positive for bloating and constipation.   Genitourinary: Negative.    Neurological: Negative.    Psychiatric/Behavioral: Negative.    Allergic/Immunologic: Negative.      Objective:     Vital Signs (Most Recent):  Temp: 97 °F (36.1 °C) (08/01/19 0300)  Pulse: 61 (08/01/19 0700)  Resp: 15 (08/01/19 0700)  BP: (!) 166/101 (08/01/19 0700)  SpO2: (!) 94 % (08/01/19 0700) Vital Signs (24h Range):  Temp:  [96.7 °F (35.9 °C)-97.7 °F (36.5 °C)] 97 °F (36.1 °C)  Pulse:  [51-62] 61  Resp:  [12-42] 15  SpO2:  [85 %-100 %] 94 %  BP: ()/() 166/101     Weight: 82.9 kg (182 lb 12.2 oz)  Body mass index is 29.5 kg/m².     SpO2: (!) 94 %  O2 Device (Oxygen Therapy): nasal cannula      Intake/Output Summary (Last 24 hours) at 8/1/2019 0812  Last data filed at 7/31/2019 2308  Gross per 24 hour   Intake 766.89 ml   Output --   Net 766.89  ml       Lines/Drains/Airways     Peripheral Intravenous Line                 Peripheral IV - Single Lumen 07/30/19 1914 18 G Right Forearm 1 day         Peripheral IV - Single Lumen 07/31/19 1325 22 G Anterior;Left Wrist less than 1 day                Physical Exam   Constitutional: He is oriented to person, place, and time. He appears well-developed and well-nourished.   HENT:   Head: Normocephalic.   Eyes: Pupils are equal, round, and reactive to light. Conjunctivae are normal.   Neck: Normal range of motion. Neck supple.   Cardiovascular: Normal rate, regular rhythm and normal heart sounds.   Pulmonary/Chest: Effort normal and breath sounds normal.   Abdominal: Soft. Bowel sounds are normal.   Neurological: He is alert and oriented to person, place, and time.   Skin: Skin is warm.   Vitals reviewed.      Significant Labs:   BMP:   Recent Labs   Lab 07/30/19 2001 07/30/19 2039 07/31/19  0406   GLU 98  --  96     --  138   K 4.0  --  3.8     --  105   CO2 23  --  23   BUN 24*  --  23*   CREATININE 1.6*  --  1.5*   CALCIUM 9.0  --  8.9   MG  --  2.1 2.0   , CMP   Recent Labs   Lab 07/30/19 2001 07/31/19  0406    138   K 4.0 3.8    105   CO2 23 23   GLU 98 96   BUN 24* 23*   CREATININE 1.6* 1.5*   CALCIUM 9.0 8.9   PROT 5.9* 5.9*   ALBUMIN 3.2* 3.1*   BILITOT 1.0 1.1*   ALKPHOS 67 70   AST 35 33   ALT 43 44   ANIONGAP 10 10   ESTGFRAFRICA 55* 59*   EGFRNONAA 47* 51*   , CBC   Recent Labs   Lab 07/30/19 2001 07/31/19 0406   WBC 4.41 4.09   HGB 11.8* 12.1*   HCT 36.7* 38.0*    349   , INR No results for input(s): INR, PROTIME in the last 48 hours., Lipid Panel   Recent Labs   Lab 07/30/19  2232   CHOL 116*   HDL 11*   LDLCALC 90.8   TRIG 71   CHOLHDL 9.5*   , Troponin   Recent Labs   Lab 07/31/19  0406 07/31/19  1009 07/31/19  2243   TROPONINI 1.821* 1.823* 1.743*    and All pertinent lab results from the last 24 hours have been reviewed.    Significant Imaging: EKG: Personally  reviewed    Assessment and Plan:         * Serg  Had 1 episode of nonsustained ventricular tachycardia approximately 25-30 seconds yesterday.  Resolved on its own.  Transferred to ICU and has been on amiodarone drip since then.  Since then no other episodes of sustained or nonsustained ventricular tachycardia have been noted.  Few PVCs have been noted totaling less than 10 since he has been on the amiodarone drip.  I will transition him to oral amiodarone 400 mg p.o. b.i.d. for 10 days followed by 200 mg daily.  I will also send him to Dr. Mccarty for consideration for AICD implantation.  We will arrange for a life vest for the patient prior to discharge.    Elevated troponin  Recent angiogram did not reveal any significant culprit lesion.  Has severe triple-vessel disease with patent grafts.  Troponin flat.  No chest pains.  No acute ischemic changes on EKG.  Continue aspirin, Plavix.  No indication to repeat coronary angiogram.    Acute chest pain  Patient with known triple-vessel disease status post CABG came in with chest pains yesterday.  Currently chest pain-free cart.  Relieved with nitroglycerin.  The patient had a recent angiogram last week which showed no culprit lesion.  Troponins have not risen significantly from the level he had prior to that angiogram.  EKG does not show any acute ischemic changes.  Currently he is chest pain-free.  He has severe triple-vessel disease was with RCA  with lot of small vessel disease which might be causing the recurrent chest pain.  I will add Imdur 30 mg daily to his regimen to be titrated up as needed for relief.  I will follow up with him in Cardiology Clinic for further workup as needed.    Currently no indication to repeat coronary angiogram.    Continue medical management with aspirin 81 mg daily, Plavix 75 mg daily, aggressive risk factor modifications as well as atorvastatin 80 mg daily.    Ischemic cardiomyopathy  lvef 30%. On coreg and lisinopril. Currently  euvolumic. Added spironolactone.  Goal to maximize medical therapy.    Coronary artery disease  Status post bypass.    History of CVA (cerebrovascular accident)        Mixed hyperlipidemia  statin    Essential hypertension  Continue current rx. Added imdur 30 mg        VTE Risk Mitigation (From admission, onward)        Ordered     enoxaparin injection 80 mg  Every 12 hours (non-standard times)      07/30/19 5377          Chadwick Ramirez MD  Cardiology  [facility]    Critical care time 35 min

## 2019-08-01 NOTE — PLAN OF CARE
Problem: Adult Inpatient Plan of Care  Goal: Plan of Care Review  Outcome: Ongoing (interventions implemented as appropriate)  Amio gtt turned off due to bradycardia. Pt sinus venkat/normal sinus rhythm all night. HR 50-60s, vitals otherwise stable. Complained of epigastric pain. Abd xray showed nonobstructive bowel gas pattern. Simethicone given prn. Pt had multiple small bowel movements overnight. Free from falls, trauma, and injury.

## 2019-08-01 NOTE — ASSESSMENT & PLAN NOTE
Had 1 episode of nonsustained ventricular tachycardia approximately 25-30 seconds yesterday.  Resolved on its own.  Transferred to ICU and has been on amiodarone drip since then.  Since then no other episodes of sustained or nonsustained ventricular tachycardia have been noted.  Few PVCs have been noted totaling less than 10 since he has been on the amiodarone drip.  I will transition him to oral amiodarone 400 mg p.o. b.i.d. for 10 days followed by 200 mg daily.  I will also send him to Dr. Mccarty for consideration for AICD implantation.  We will arrange for a life vest for the patient prior to discharge.

## 2019-08-01 NOTE — PLAN OF CARE
Problem: Adult Inpatient Plan of Care  Goal: Plan of Care Review  Outcome: Ongoing (interventions implemented as appropriate)  0930  VSS, complains of constipation, aggressive bowel regimen promoted, BM x2 last shift, abd firm, BS hypoactive, up to bedside commode, 96% on RA, denies chest pain, bed in low locked position, call light within reach, able to make needs known; no needs at this time.

## 2019-08-01 NOTE — CONSULTS
SAKSHI spoke with Nailni at [facility] [phone], Pt already has a vest, he has to return the old one in order to get a new one. Pt has a history of being missing and unresponsive, pt needs 3 valid reference that will answer when [employer] call.

## 2019-08-01 NOTE — PROGRESS NOTES
PT stated he went to senior living last year In [place] and they still have the life vest, he wasn't charged but he didn't get his belongings, pt will call sister to try and get his things.

## 2019-08-01 NOTE — ASSESSMENT & PLAN NOTE
Patient recently underwent LHC five days ago which was revealing for:    1.  Severe triple-vessel coronary artery disease  2.  Patent BARAJAS to LAD, patent SVG to diagonal 1 and patent SVG to circumflex.  No culprit stenosis noted in these grafts  3.  Mid to distal RCA .  Distal RCA fills with collaterals from the left.  Unchanged from prior.    Patient has a troponin of 1.955 which is consistent to recent measurements--it appears to be flat trending.  EKG on presentation with normal sinus rhythm with T-wave inversions in the inferolateral leads which appears to be chronic.  He is at high risk for ACS and was admitted to the Observation Unit for serial cardiac markers and Cardiology consultation.    Appreciate cardiology recs. Started on Imdur. Follow up in clinic.

## 2019-08-01 NOTE — ASSESSMENT & PLAN NOTE
As previously stated in hospital course vtach x2 episodes since admission. Discontinued ABX/contributory and consulted to cardiology. Patient with known cardiac disease - amio infusion + cardiac monitoring.  Transitioned to PO amiodarone. Needs LifeVest prior to DC. Will follow up with Dr. Mccarty in clinic to [facility] for AICD.

## 2019-08-01 NOTE — CARE UPDATE
[facility]  ICU Multidisciplinary Bedside Rounds     UPDATE     Date: 8/1/2019      Plan of care reviewed with the following, Nurse, Charge Nurse, Physician, , Resp. Therapist and Dietician.       Needs/ Goals for the day: transfer to floor, get out of bed, move bowels, set up appts for Lul for AICD in one week, and follow up with Ashley in two-three weeks, Life Vest prior to discharge.    Level of Care: OK to Transfer

## 2019-08-01 NOTE — NURSING
Note that patient transferred from ICU; He is awake, alert and fully oriented;  Denies pain at this time.     V/S stable at 115/80, 60 beats per minute, 20, 98.6;    Telemetry box: 1515; NSR on telemetry.    Will give shift change report to oncoming nurse.     Will continue to f/u.

## 2019-08-01 NOTE — SUBJECTIVE & OBJECTIVE
Interval History:  Overnight no chest pains.  Check tele no further episodes of V-tach.  Yesterday had prolonged nonsustained ventricular tachycardia, 1 episode.  Was transferred to ICU and put on amiodarone drip.  Since has he has been on the amiodarone drip no episodes of ventricular tachycardia or nonsustained ventricular tachycardia have been noted.  He had a few couplets of PVCs.  Denies any chest pains.  Did have constipation yesterday which is better today as per the patient.    Review of Systems   Constitution: Negative.   HENT: Negative.    Eyes: Negative.    Cardiovascular: Negative.    Respiratory: Negative.    Endocrine: Negative.    Hematologic/Lymphatic: Negative.    Skin: Negative.    Musculoskeletal: Negative.    Gastrointestinal: Positive for bloating and constipation.   Genitourinary: Negative.    Neurological: Negative.    Psychiatric/Behavioral: Negative.    Allergic/Immunologic: Negative.      Objective:     Vital Signs (Most Recent):  Temp: 97 °F (36.1 °C) (08/01/19 0300)  Pulse: 61 (08/01/19 0700)  Resp: 15 (08/01/19 0700)  BP: (!) 166/101 (08/01/19 0700)  SpO2: (!) 94 % (08/01/19 0700) Vital Signs (24h Range):  Temp:  [96.7 °F (35.9 °C)-97.7 °F (36.5 °C)] 97 °F (36.1 °C)  Pulse:  [51-62] 61  Resp:  [12-42] 15  SpO2:  [85 %-100 %] 94 %  BP: ()/() 166/101     Weight: 82.9 kg (182 lb 12.2 oz)  Body mass index is 29.5 kg/m².     SpO2: (!) 94 %  O2 Device (Oxygen Therapy): nasal cannula      Intake/Output Summary (Last 24 hours) at 8/1/2019 0812  Last data filed at 7/31/2019 2308  Gross per 24 hour   Intake 766.89 ml   Output --   Net 766.89 ml       Lines/Drains/Airways     Peripheral Intravenous Line                 Peripheral IV - Single Lumen 07/30/19 1914 18 G Right Forearm 1 day         Peripheral IV - Single Lumen 07/31/19 1325 22 G Anterior;Left Wrist less than 1 day                Physical Exam   Constitutional: He is oriented to person, place, and time. He appears  well-developed and well-nourished.   HENT:   Head: Normocephalic.   Eyes: Pupils are equal, round, and reactive to light. Conjunctivae are normal.   Neck: Normal range of motion. Neck supple.   Cardiovascular: Normal rate, regular rhythm and normal heart sounds.   Pulmonary/Chest: Effort normal and breath sounds normal.   Abdominal: Soft. Bowel sounds are normal.   Neurological: He is alert and oriented to person, place, and time.   Skin: Skin is warm.   Vitals reviewed.      Significant Labs:   BMP:   Recent Labs   Lab 07/30/19 2001 07/30/19 2039 07/31/19  0406   GLU 98  --  96     --  138   K 4.0  --  3.8     --  105   CO2 23  --  23   BUN 24*  --  23*   CREATININE 1.6*  --  1.5*   CALCIUM 9.0  --  8.9   MG  --  2.1 2.0   , CMP   Recent Labs   Lab 07/30/19 2001 07/31/19 0406    138   K 4.0 3.8    105   CO2 23 23   GLU 98 96   BUN 24* 23*   CREATININE 1.6* 1.5*   CALCIUM 9.0 8.9   PROT 5.9* 5.9*   ALBUMIN 3.2* 3.1*   BILITOT 1.0 1.1*   ALKPHOS 67 70   AST 35 33   ALT 43 44   ANIONGAP 10 10   ESTGFRAFRICA 55* 59*   EGFRNONAA 47* 51*   , CBC   Recent Labs   Lab 07/30/19 2001 07/31/19 0406   WBC 4.41 4.09   HGB 11.8* 12.1*   HCT 36.7* 38.0*    349   , INR No results for input(s): INR, PROTIME in the last 48 hours., Lipid Panel   Recent Labs   Lab 07/30/19  2232   CHOL 116*   HDL 11*   LDLCALC 90.8   TRIG 71   CHOLHDL 9.5*   , Troponin   Recent Labs   Lab 07/31/19 0406 07/31/19  1009 07/31/19  2243   TROPONINI 1.821* 1.823* 1.743*    and All pertinent lab results from the last 24 hours have been reviewed.    Significant Imaging: EKG: Personally reviewed

## 2019-08-01 NOTE — ASSESSMENT & PLAN NOTE
Recent angiogram did not reveal any significant culprit lesion.  Has severe triple-vessel disease with patent grafts.  Troponin flat.  No chest pains.  No acute ischemic changes on EKG.  Continue aspirin, Plavix.  No indication to repeat coronary angiogram.

## 2019-08-01 NOTE — CARE UPDATE
[facility]  ICU Multidisciplinary Bedside Rounds   SUMMARY     Date: 8/1/2019    Prehospitalization: Home  Admit Date / LOS : 7/30/2019/ 1 days    Diagnosis: V-tach    Consults:        Active: Cardio       Needed: N/A     Code Status: Full Code   Advanced Directive: <no information>    LDA: PIV       Central Lines/Site/Justification:Patient Does Not Have Central Line       Urinary Cath/Order/Justification:Patient Does Not Have Urinary Catheter    Vasopressors/Infusions:    amiodarone in dextrose 5% Stopped (07/31/19 2881)          GOALS: Volume/ Hemodynamic: N/A                     RASS: N/A    CAM ICU: Negative  Pain Management: PO       Pain Controlled: yes     Rhythm: SB    Respiratory Device: Nasal Cannula                  Most Recent SBT/ SAT: N/A       MOVE Screen: PASS    VTE Prophylaxis: Pharm and Ambulation  Mobility: OOB to Chair  Stress Ulcer Prophylaxis: No    Dietary: PO  Tolerance: yes  /  Advancement: @ goal    Isolation: No active isolations    Restraints: No    Significant Dates:  Post Op Date: N/A  Rescue Date: N/A  Imaging/ Diagnostics: abd xray 7/31/19    Noteworthy Labs:  Troponin 1.743 (stable)    CBC/Anemia Labs: Coags:    Recent Labs   Lab 07/27/19  0454 07/30/19 2001 07/31/19  0406   WBC 5.83 4.41 4.09   HGB 12.9* 11.8* 12.1*   HCT 39.8* 36.7* 38.0*    348 349   MCV 88 89 88   RDW 15.8* 15.8* 15.7*    No results for input(s): PT, INR, APTT in the last 168 hours.     Chemistries:   Recent Labs   Lab 07/25/19  1319 07/26/19  0203 07/30/19 2001 07/30/19  2039 07/31/19  0406    138 138  --  138   K 4.3 3.6 4.0  --  3.8    102 105  --  105   CO2 22* 23 23  --  23   BUN 20 20 24*  --  23*   CREATININE 1.5* 1.6* 1.6*  --  1.5*   CALCIUM 8.8 9.0 9.0  --  8.9   PROT 6.4 6.2 5.9*  --  5.9*   BILITOT 1.7* 2.3* 1.0  --  1.1*   ALKPHOS 57 62 67  --  70   ALT 34 36 43  --  44   AST 43* 38 35  --  33   MG 1.9  --   --  2.1 2.0   PHOS  --  3.3  --   --  2.9         Cardiac Enzymes: Ejection Fractions:    Recent Labs     07/31/19  0406 07/31/19  1009 07/31/19  2243   TROPONINI 1.821* 1.823* 1.743*    No results found for: EF     POCT Glucose: HbA1c:    No results for input(s): POCTGLUCOSE in the last 168 hours. Hemoglobin A1C   Date Value Ref Range Status   01/18/2018 5.3 4.0 - 5.6 % Final     Comment:     According to ADA guidelines, hemoglobin A1c <7.0% represents  optimal control in non-pregnant diabetic patients. Different  metrics may apply to specific patient populations.   Standards of Medical Care in Diabetes-2016.  For the purpose of screening for the presence of diabetes:  <5.7%     Consistent with the absence of diabetes  5.7-6.4%  Consistent with increasing risk for diabetes   (prediabetes)  >or=6.5%  Consistent with diabetes  Currently, no consensus exists for use of hemoglobin A1c  for diagnosis of diabetes for children.  This Hemoglobin A1c assay has significant interference with fetal   hemoglobin   (HbF). The results are invalid for patients with abnormal amounts of   HbF,   including those with known Hereditary Persistence   of Fetal Hemoglobin. Heterozygous hemoglobin variants (HbAS, HbAC,   HbAD, HbAE, HbA2) do not significantly interfere with this assay;   however, presence of multiple variants in a sample may impact the %   interference.          Needs from Care Team: get patient oob and ambulatory     ICU LOS 16h  Level of Care: OK to Transfer

## 2019-08-01 NOTE — SUBJECTIVE & OBJECTIVE
Interval History:  Feeling much better. Denies chest pain overnight.     Review of Systems   Constitutional: Negative for activity change, appetite change, chills, diaphoresis, fatigue, fever and unexpected weight change.   Respiratory: Negative for cough, chest tightness and wheezing.    Cardiovascular: Negative for chest pain and leg swelling.   Gastrointestinal: Negative for abdominal distention, abdominal pain, blood in stool, constipation, diarrhea, nausea and vomiting.   Genitourinary: Negative for dysuria and hematuria.   Neurological: Negative for dizziness, seizures, syncope, weakness and light-headedness.     Objective:     Vital Signs (Most Recent):  Temp: 97 °F (36.1 °C) (08/01/19 0300)  Pulse: (!) 57 (08/01/19 1000)  Resp: (!) 21 (08/01/19 1000)  BP: (!) 145/84 (08/01/19 1000)  SpO2: 96 % (08/01/19 1000) Vital Signs (24h Range):  Temp:  [96.7 °F (35.9 °C)-97.7 °F (36.5 °C)] 97 °F (36.1 °C)  Pulse:  [51-69] 57  Resp:  [12-42] 21  SpO2:  [85 %-100 %] 96 %  BP: ()/() 145/84     Weight: 82.9 kg (182 lb 12.2 oz)  Body mass index is 29.5 kg/m².    Physical Exam   Constitutional: He is oriented to person, place, and time. He appears well-developed and well-nourished. No distress.   HENT:   Head: Normocephalic and atraumatic.   Right Ear: External ear normal.   Left Ear: External ear normal.   Nose: Nose normal.   Eyes: Right eye exhibits no discharge. Left eye exhibits no discharge.   Neck: Normal range of motion.   Cardiovascular: Normal rate, regular rhythm, normal heart sounds and intact distal pulses. Exam reveals no friction rub.   No murmur heard.  Pulmonary/Chest: Effort normal and breath sounds normal. No stridor. No respiratory distress. He has no wheezes. He has no rales.   Abdominal: Soft. Bowel sounds are normal. He exhibits no distension. There is no tenderness. There is no rebound and no guarding.   Musculoskeletal: Normal range of motion. He exhibits no edema.   Neurological: He is  alert and oriented to person, place, and time.   Skin: Skin is warm and dry. He is not diaphoretic. No erythema.   Psychiatric: He has a normal mood and affect. His behavior is normal. Judgment and thought content normal.   Nursing note and vitals reviewed.          Significant Labs: All pertinent labs within the past 24 hours have been reviewed.    Significant Imaging: I have reviewed and interpreted all pertinent imaging results/findings within the past 24 hours.

## 2019-08-01 NOTE — NURSING TRANSFER
Nursing Transfer Note      8/1/2019     Transfer To: [address]    Transfer via wheelchair    Transfer with cardiac monitoring    Transported by RN     Medicines sent: nitro tablets    Chart send with patient: Yes    Notified: pt notified family    Patient reassessed at: 08/01/2019    Upon arrival to floor: cardiac monitor applied, call bell in reach and bed in lowest position

## 2019-08-01 NOTE — PROGRESS NOTES
[facility] Hospital Medicine  Progress Note    Patient Name: Abdoul Ochoa  MRN: [id number]  Patient Class: IP- Inpatient   Admission Date: 7/30/2019  Length of Stay: 1 days  Attending Physician: Marce Shah MD  Primary Care Provider: Primary Doctor No        Subjective:     Principal Problem:V-tach      HPI:  Mr. Abdoul Ochoa is a 54 y.o. male with essential hypertension, hyperlipidemia (.8 Jan 2018), CAD s/p CABG & PCI, CKD stage 3, anemia of chronic disease, and history of CVA who presents to [facility] ED with complaints of chest pain for one day.  The chest pain started at rest yesterday, was midsternal without radiation, and was 9/10 in severity at its worst.  He cannot describe the quality of the pain but says that felt similar to the pain he felt when he had his heart attacks.  He also had some associated palpitations and diaphoresis but denies any fevers, chills, nausea, vomiting, hemoptysis, nor any lower extremity pain or swelling.  He also denies any PND nor orthopnea and hasn't had any recent travel nor sick contacts.  He took on sublingual nitroglycerin which helped transiently but when the pain recurred he decided to seek ED evaluation.  He reports that he is compliant with his medications.      Overview/Hospital Course:  Very pleasant 54 year old AAM with history of CAD CABG in 01/2019 placed in observation for chest pain - had a run of tachy overnight that was not sustained; converted spontaneously. At around 1120 am this morning - patient had >30 beat run of vtach he was not symptomatic.      He reports that he had been having treatment for hepatitis as outpatient by injections once monthly. Since his last hospitalization - [facility] was [facility] and placed on oral Augmentin. He states that he feels the palpitations and gets sweaty shortly after each dose of Augmentin.    Additionally, the patient does have severe triple vessel disease according to Cards note. Dr. Ramirez  notified of arrhythmia, - transfer to intensive care for amio infusion. V tach resolved. He was transitionedto PO amiodarone. The patient has an EF of 25-30% and needs a LifeVest. He will follow up with Dr. Mccarty next week for evaluation for AICD placement. He will follow up with his regular cardiologist, Dr. Ramirez, in 2-3 weeks.     Interval History:  Feeling much better. Denies chest pain overnight.     Review of Systems   Constitutional: Negative for activity change, appetite change, chills, diaphoresis, fatigue, fever and unexpected weight change.   Respiratory: Negative for cough, chest tightness and wheezing.    Cardiovascular: Negative for chest pain and leg swelling.   Gastrointestinal: Negative for abdominal distention, abdominal pain, blood in stool, constipation, diarrhea, nausea and vomiting.   Genitourinary: Negative for dysuria and hematuria.   Neurological: Negative for dizziness, seizures, syncope, weakness and light-headedness.     Objective:     Vital Signs (Most Recent):  Temp: 97 °F (36.1 °C) (08/01/19 0300)  Pulse: (!) 57 (08/01/19 1000)  Resp: (!) 21 (08/01/19 1000)  BP: (!) 145/84 (08/01/19 1000)  SpO2: 96 % (08/01/19 1000) Vital Signs (24h Range):  Temp:  [96.7 °F (35.9 °C)-97.7 °F (36.5 °C)] 97 °F (36.1 °C)  Pulse:  [51-69] 57  Resp:  [12-42] 21  SpO2:  [85 %-100 %] 96 %  BP: ()/() 145/84     Weight: 82.9 kg (182 lb 12.2 oz)  Body mass index is 29.5 kg/m².    Physical Exam   Constitutional: He is oriented to person, place, and time. He appears well-developed and well-nourished. No distress.   HENT:   Head: Normocephalic and atraumatic.   Right Ear: External ear normal.   Left Ear: External ear normal.   Nose: Nose normal.   Eyes: Right eye exhibits no discharge. Left eye exhibits no discharge.   Neck: Normal range of motion.   Cardiovascular: Normal rate, regular rhythm, normal heart sounds and intact distal pulses. Exam reveals no friction rub.   No murmur  heard.  Pulmonary/Chest: Effort normal and breath sounds normal. No stridor. No respiratory distress. He has no wheezes. He has no rales.   Abdominal: Soft. Bowel sounds are normal. He exhibits no distension. There is no tenderness. There is no rebound and no guarding.   Musculoskeletal: Normal range of motion. He exhibits no edema.   Neurological: He is alert and oriented to person, place, and time.   Skin: Skin is warm and dry. He is not diaphoretic. No erythema.   Psychiatric: He has a normal mood and affect. His behavior is normal. Judgment and thought content normal.   Nursing note and vitals reviewed.          Significant Labs: All pertinent labs within the past 24 hours have been reviewed.    Significant Imaging: I have reviewed and interpreted all pertinent imaging results/findings within the past 24 hours.      Assessment/Plan:      * V-tach  As previously stated in hospital course vtach x2 episodes since admission. Discontinued ABX/contributory and consulted to cardiology. Patient with known cardiac disease - amio infusion + cardiac monitoring.  Transitioned to PO amiodarone. Needs LifeVest prior to DC. Will follow up with Dr. Mccarty in clinic to [facility] for AICD.    Elevated troponin  See above. Appreciate cardiology recs.    CKD stage 3  His renal function appears to be at his baseline; will continue to monitor his urine output.    Acute chest pain  Patient recently underwent LHC five days ago which was revealing for:    1.  Severe triple-vessel coronary artery disease  2.  Patent BARAJAS to LAD, patent SVG to diagonal 1 and patent SVG to circumflex.  No culprit stenosis noted in these grafts  3.  Mid to distal RCA .  Distal RCA fills with collaterals from the left.  Unchanged from prior.    Patient has a troponin of 1.955 which is consistent to recent measurements--it appears to be flat trending.  EKG on presentation with normal sinus rhythm with T-wave inversions in the inferolateral leads which appears  to be chronic.  He is at high risk for ACS and was admitted to the Observation Unit for serial cardiac markers and Cardiology consultation.    Appreciate cardiology recs. Started on Imdur. Follow up in clinic.    Anemia of chronic disease  The patient's H/H is stable and consistent with previous laboratory measurements, and the patient exhibits no signs or symptoms of acute bleeding; there is no indication for transfusion.  Will continue to monitor.    Coronary artery disease  As addressed above.    History of CVA (cerebrovascular accident)  Stable; will continue his home regimen of aspirin and atorvastatin.    Mixed hyperlipidemia  Poorly controlled; will continue his home regimen of atorvastatin.    Essential hypertension  Patient's blood pressure is fair-controlled; will continue home regimen of carvedilol and lisinopril, and provide as-needed clonidine.      VTE Risk Mitigation (From admission, onward)        Ordered     enoxaparin injection 80 mg  Every 12 hours (non-standard times)      07/30/19 0751          Critical care time spent on the evaluation and treatment of severe organ dysfunction, review of pertinent labs and imaging studies, discussions with consulting providers and discussions with patient/family: 25 minutes.      Marce Shah MD  Department of Hospital Medicine   [facility]

## 2019-08-01 NOTE — PROVIDER TRANSFER
53 yo man with heart failure. Stepped up from obs on 7/30 for runs of Vtach to ICU for amiodarone infusion. Resolved and converted to PO amiodarone on 8/1. Awaiting LifeVest prior to DC. DC when he has a working LifeVest. Follow up with SAKSHI.

## 2019-08-02 PROBLEM — N18.30 ANEMIA IN STAGE 3 CHRONIC KIDNEY DISEASE: Status: ACTIVE | Noted: 2018-01-18

## 2019-08-02 PROBLEM — I47.29 NONSUSTAINED VENTRICULAR TACHYCARDIA: Status: ACTIVE | Noted: 2019-01-01

## 2019-08-02 PROBLEM — D63.1 ANEMIA IN STAGE 3 CHRONIC KIDNEY DISEASE: Status: ACTIVE | Noted: 2018-01-18

## 2019-08-02 PROBLEM — I25.118 CORONARY ARTERY DISEASE OF NATIVE ARTERY OF NATIVE HEART WITH STABLE ANGINA PECTORIS: Status: ACTIVE | Noted: 2018-01-18

## 2019-08-02 NOTE — PROGRESS NOTES
AAOX3  Denies any needs and no distress noted.  VSS.  SB on tele.  Report given to Noemi at [facility].  Waiting for transport

## 2019-08-02 NOTE — PROGRESS NOTES
Patient c/o abdominal pain & discomfort, intermittent nausea & dry-heaving, no emesis noted.   PRN Zofran IV 4mg given, no relief. Promethazine IV12.5 given, no relief.   MD notified- New orders PRN Zofran IV 8mg & Compazine IV10mg.    Compazine IV 10mg given, patient verbalized relief and is resting calmly & quietly in bed. Will continue to monitor.

## 2019-08-02 NOTE — ASSESSMENT & PLAN NOTE
"Had 1 episode of nonsustained ventricular tachycardia approximately 25-30 seconds.  Resolved on its own.    Amio gtt transitioned to PO, no further sustained VT.  Pt prev had lifevest, but apparently "lost" it when he was incarcerated and cannot receive another.  Case d/w Dr. Harrison ([facility]) who agrees ICD is indicated, will plan transfer for placement as inpat.  Case d/w Dr. Olguin of [facility].  "

## 2019-08-02 NOTE — SUBJECTIVE & OBJECTIVE
Review of Systems   Gastrointestinal: Negative for melena.   Genitourinary: Negative for hematuria.     Objective:     Vital Signs (Most Recent):  Temp: 97.5 °F (36.4 °C) (08/02/19 0722)  Pulse: 60 (08/02/19 0722)  Resp: 18 (08/02/19 0722)  BP: (!) 141/96 (08/02/19 0722)  SpO2: (!) 93 % (08/02/19 0722) Vital Signs (24h Range):  Temp:  [97.3 °F (36.3 °C)-97.9 °F (36.6 °C)] 97.5 °F (36.4 °C)  Pulse:  [55-61] 60  Resp:  [13-26] 18  SpO2:  [93 %-97 %] 93 %  BP: (111-148)/(72-96) 141/96     Weight: 83 kg (182 lb 15.7 oz)  Body mass index is 29.53 kg/m².     SpO2: (!) 93 %  O2 Device (Oxygen Therapy): room air      Intake/Output Summary (Last 24 hours) at 8/2/2019 1016  Last data filed at 8/1/2019 1600  Gross per 24 hour   Intake 360 ml   Output --   Net 360 ml       Lines/Drains/Airways     Peripheral Intravenous Line                 Peripheral IV - Single Lumen 07/30/19 1914 18 G Right Forearm 2 days         Peripheral IV - Single Lumen 07/31/19 1325 22 G Anterior;Left Wrist 1 day                Physical Exam    Current Medications:   amiodarone  400 mg Oral BID    aspirin  81 mg Oral Daily    atorvastatin  80 mg Oral Daily    carvedilol  25 mg Oral BID    clopidogrel  75 mg Oral Daily    enoxaparin  1 mg/kg Subcutaneous Q12H    furosemide  40 mg Oral BID    isosorbide mononitrate  60 mg Oral Daily    lisinopril  40 mg Oral Daily    mineral oil  1 enema Rectal Once    polyethylene glycol  17 g Oral Daily    spironolactone  25 mg Oral Daily       acetaminophen, cloNIDine, ondansetron, polyethylene glycol, prochlorperazine, promethazine (PHENERGAN) IVPB, ramelteon, senna-docusate 8.6-50 mg, simethicone    Laboratory (all labs reviewed):  CBC:  Recent Labs   Lab 07/25/19  1319 07/26/19  0203 07/27/19  0454 07/30/19 2001 07/31/19  0406   WBC 5.15 5.90 5.83 4.41 4.09   Hemoglobin 12.1 L 12.5 L 12.9 L 11.8 L 12.1 L   Hematocrit 37.1 L 38.4 L 39.8 L 36.7 L 38.0 L   Platelets 272 291 278 348 349        CHEMISTRIES:  Recent Labs   Lab 01/18/18 0254 01/19/18  0524 03/25/18 1752 07/25/19 1319 07/26/19 0203 07/30/19 2001 07/30/19 2039 07/31/19  0406   Glucose 111 H  --  90 89 106 98  --  96   Sodium 140  --  139 137 138 138  --  138   Potassium 3.4 L  --  3.2 L 4.3 3.6 4.0  --  3.8   BUN, Bld 22 H  --  15 20 20 24 H  --  23 H   Creatinine 1.3  --  1.5 H 1.5 H 1.6 H 1.6 H  --  1.5 H   eGFR if  >60  --  60 59 A 55 A 55 A  --  59 A   eGFR if non  >60  --  52 A 51 A 47 A 47 A  --  51 A   Calcium 9.5  --  9.6 8.8 9.0 9.0  --  8.9   Magnesium 1.8 1.7  --  1.9  --   --  2.1 2.0       CARDIAC BIOMARKERS:  Recent Labs   Lab 01/18/18  0008  07/25/19 1319 07/30/19 2001 07/30/19 2232 07/31/19 0406 07/31/19  1009 07/31/19  2243   CPK 84  --  235 H  235 H  --   --   --   --   --   --    CPK MB 2.1  --  3.2  --   --   --   --   --   --    Troponin I 1.291 H   < > 1.684 H   < > 1.885 H 1.955 H 1.821 H 1.823 H 1.743 H    < > = values in this interval not displayed.       COAGS:  Recent Labs   Lab 01/17/18  0636   INR 1.1       LIPIDS/LFTS:  Recent Labs   Lab 01/18/18 0254 03/25/18 1752 07/25/19 1319 07/26/19 0203 07/30/19 2001 07/30/19 2232 07/31/19  0406   Cholesterol 156  --   --   --   --  116 L  --    Triglycerides 81  --   --   --   --  71  --    HDL 38 L  --   --   --   --  11 L  --    LDL Cholesterol 101.8  --   --   --   --  90.8  --    Non-HDL Cholesterol 118  --   --   --   --  105  --    AST 29 20 43 H 38 35  --  33   ALT 33 32 34 36 43  --  44       BNP:  Recent Labs   Lab 01/17/18  0636 03/25/18  1752 07/30/19 2001   BNP 1,849 H 1,039 H 3,044 H       TSH:        Free T4:        Diagnostic Results:  Echo: 7/26/19  · Moderately decreased left ventricular systolic function. The estimated ejection fraction is 30%  · Moderate concentric left ventricular hypertrophy.  · Grade III (severe) left ventricular diastolic dysfunction consistent with restrictive  physiology.  · Elevated left atrial pressure.  · Mild right ventricular enlargement.  · Moderate left atrial enlargement.  · Mild right atrial enlargement.  · Moderate-to-severe mitral regurgitation.  · Moderate tricuspid regurgitation.  · Mild pulmonic regurgitation.  · Intermediate central venous pressure (8 mm Hg).  · The estimated PA systolic pressure is 35 mm Hg  · Pulmonary hypertension present.    Cath: 7/26/19  1.  Severe triple-vessel coronary artery disease  2.  Patent BARAJAS to LAD, patent SVG to diagonal 1 and patent SVG to circumflex.  No culprit stenosis noted in these grafts  3.  Mid to distal RCA .  Distal RCA fills with collaterals from the left.  Unchanged from prior.  Findings/Key Components:  LVEDP: 19 mmHg  Dominance: Right   LM:  Patent with no significant stenosis  LAD:  Diffusely disease proximal to mid.  Fills with LIMA to LAD and SVG to diagonal 1  LCx:  Native vessel diffusely disease.  Fills with SVG to OM2  RCA:  Proximal 70% stenosis.  Distal .  Fills with collaterals from the left.  Hemostasis:  RFA 5 [place] sheath.  Manual pressure held in the cath lab for hemostasis  Impression:  Severe triple-vessel coronary artery disease.  Patent LIMA to LAD, SVG to OM and SVG to diagonal.  No acute culprit lesion identified  Plan:  Aspirin 81 mg daily indefinitely  Plavix 75 mg daily for at least 1 year  Follow-up in Cardiology Clinic

## 2019-08-02 NOTE — PROGRESS NOTES
Received report from  JUAN Colmenares. Patient AAOx4, resting quietly in bed & telemetry monitoring in place, no distress noted. Safety maintained, call light in reach.

## 2019-08-02 NOTE — PROGRESS NOTES
1559 TN was informed by Lisa in , that pt is being transferred to [facility].

## 2019-08-02 NOTE — PLAN OF CARE
[facility] admissions ONLY: Please call extension [phone] upon patient arrival to floor for Hospital Medicine admit team assignment and for additional admit orders for the patient. Do not page the attending, staff physician or Advanced Practice Provider with the patient on arrival (may not be in-house at the time of arrival). Call back or wait to leave beeper number when prompted.     (Physician in Lead of Transfers) /Regional Referral Center Note    Patients name/MRN: Abdoul Ochoa/MRN [id number]     Referring Physician or Mid-Level provider giving report: Dr. Aftab Lemus (Cardiology)  Contact number: [phone]    Referral Facility: [facility]    Date/Time of Acceptance: 8/2/2019 10:20 AM    /Accepting Physician: Marlyn Olguin MD, Hospital Medicine     Accepting facility: [facility] Med/Surg Telemetry, Cardiac Stepdown Unit    Consulting Physicians from [facility] involved in case:  DR. Jaxon Harrison ([facility]- Cardiology-Arrhythmia)    Reason for transfer request: Needs AICD for NSVT in patient with ischemic cardiomyopathy    Report from Physician/Mid-Level Provider/HPI: 53 y/o male with ischemic cardiomyopathy (EF 25-30%), essential hypertension, mixed hyperlipidemia (.8 Jan 2018), CAD s/p CABG & PCI, CKD stage 3, anemia of CKD stage 3, and history of CVA was admitted to [facility] for recurrent chest pain. Patient with known severe CAD on recent LHC at [facility] but felt related to small vessel disease and medical management only as per Cardiology. During admit had extended run of NSVT about 30 beats and transferred to ICU for IV Amiodarone infusion and now on floor on po Amiodarone. Patient still having issues with NSVT so Cardiology at [facility] discussed case with Dr. Harrison from Arrythmia at [facility] who recommended transfer for EP for defibrillator placement. Patient had a Life Vest last year but got incarcerated and unknown where Life  "vest is now and cannot get another one according to case management notes and thus reason why he needs AICD prior to discharge. Patient is chest pain free currently.     VS: /96   Pulse 60   Temp 97.5 °F (36.4 °C)   Resp 18   Ht 5' 6" (1.676 m)   Wt 83 kg (182 lb 15.7 oz)   SpO2 93% on room air BMI 29.53 kg/m²    Past Medical/Surgical History: See EPIC    Meds: See EPIC    Labs: See EPIC    Imaging: See EPIC    To Do List upon arrival:     No admit H&P needed from hospital medicine.as this is a [facility] [place] transfer   Admit to [facility] or [facility] to Cardiac Stepdown Unit with telemetry   Consult EP Cardiology for defibrillator placement    Marlyn Olguin MD  Senior Staff Physician  Department of Hospital Medicine  Patient Flow Center/   [phone]    "

## 2019-08-02 NOTE — ASSESSMENT & PLAN NOTE
Patient with known triple-vessel disease status post CABG came in with chest pains.  Currently chest pain-free.  The patient had a recent angiogram last week which showed no culprit lesion.    Troponins have not risen significantly from the level he had prior to that angiogram.    EKG does not show any acute ischemic changes.    He has severe triple-vessel disease was with RCA  with lot of small vessel disease which might be causing the recurrent chest pain.   Cont Imdur 30 mg daily to his regimen to be titrated up as needed for relief.    Currently no indication to repeat coronary angiogram.    Continue medical management with aspirin 81 mg daily, Plavix 75 mg daily, aggressive risk factor modifications as well as atorvastatin 80 mg daily.

## 2019-08-02 NOTE — SUBJECTIVE & OBJECTIVE
Interval History:  Resting in bed, reports chest pain (denies medication need) and nausea but feels eating will help. He denies shortness of breath or palpitations. Understands plan of care. Advised to notify team if CP does not alleviate or worsens.    Review of Systems   Constitutional: Negative for activity change, appetite change, chills, diaphoresis, fatigue, fever and unexpected weight change.   HENT: Negative for congestion, sore throat and trouble swallowing.    Respiratory: Negative for cough, chest tightness and wheezing.    Cardiovascular: Positive for chest pain (1/10 ). Negative for leg swelling.   Gastrointestinal: Positive for nausea. Negative for abdominal distention, abdominal pain, constipation, diarrhea and vomiting.   Genitourinary: Negative for decreased urine volume, difficulty urinating, dysuria and urgency.   Musculoskeletal: Negative for arthralgias, back pain, gait problem and joint swelling.   Skin: Negative for rash and wound.   Neurological: Negative for dizziness, syncope, weakness, light-headedness and headaches.   Psychiatric/Behavioral: The patient is nervous/anxious.      Objective:     Vital Signs (Most Recent):  Temp: 97.5 °F (36.4 °C) (08/02/19 0722)  Pulse: 60 (08/02/19 0722)  Resp: 18 (08/02/19 0722)  BP: (!) 141/96 (08/02/19 0722)  SpO2: (!) 93 % (08/02/19 0722) Vital Signs (24h Range):  Temp:  [97.3 °F (36.3 °C)-97.9 °F (36.6 °C)] 97.5 °F (36.4 °C)  Pulse:  [55-61] 60  Resp:  [13-26] 18  SpO2:  [93 %-97 %] 93 %  BP: (111-148)/(72-96) 141/96     Weight: 83 kg (182 lb 15.7 oz)  Body mass index is 29.53 kg/m².    Physical Exam   Constitutional: He is oriented to person, place, and time. He appears well-developed and well-nourished. No distress.   HENT:   Head: Normocephalic and atraumatic.   Mouth/Throat: No oropharyngeal exudate.   Eyes: EOM are normal. Right eye exhibits no discharge. Left eye exhibits no discharge. No scleral icterus.   Neck: Normal range of motion. No JVD  present.   Cardiovascular: Normal rate, regular rhythm, normal heart sounds and intact distal pulses. Exam reveals no friction rub.   No murmur heard.  Pulmonary/Chest: Effort normal and breath sounds normal. No respiratory distress. He has no wheezes. He has no rales.   Abdominal: Soft. Bowel sounds are normal. He exhibits no distension. There is no tenderness.   Musculoskeletal: Normal range of motion. He exhibits no edema.   Neurological: He is alert and oriented to person, place, and time.   Skin: Skin is warm and dry. He is not diaphoretic. No erythema.   Large healed skin graft site noted to left calf.    Psychiatric: His behavior is normal. Judgment and thought content normal. His mood appears anxious.   Nursing note and vitals reviewed.        CRANIAL NERVES     CN III, IV, VI   Extraocular motions are normal.     Significant Labs:   CBC:   Recent Labs   Lab 08/03/19  0820   WBC 4.97   HGB 11.3*   HCT 35.8*        CMP:   Recent Labs   Lab 08/03/19  0820   *   K 4.5      CO2 22*   GLU 86   BUN 25*   CREATININE 1.8*   CALCIUM 9.2   ANIONGAP 9   EGFRNONAA 41.2*     Magnesium:   Recent Labs   Lab 08/03/19  0820   MG 2.0     All pertinent labs within the past 24 hours have been reviewed.    Significant Imaging: I have reviewed and interpreted all pertinent imaging results/findings within the past 24 hours.

## 2019-08-02 NOTE — PROGRESS NOTES
Attempted to give report to [facility]  Nurse unable to take report.  Left Spectralink number for nurse to return call.

## 2019-08-02 NOTE — HOSPITAL COURSE
"7/30/19:    Interval Hx: no cp/sob/palps/LH/LOC.  Had some abd discomfort, now resolved.  Case d/w RN and Dr. Morales.  Apparently, pt "lost" his previous lifevest in halfway and will not be given another.  I further discussed case with Dr. Harrison at [facility] who has agreed to accept the pt for ICD placement.    Tele: SR 60s, PVCs, NSVT 3 beats (personally reviewed and interpreted)    "

## 2019-08-02 NOTE — PROGRESS NOTES
"[facility]  Cardiology  Progress Note    Patient Name: Abdoul Ochoa  MRN: [id number]  Admission Date: 7/30/2019  Hospital Length of Stay: 2 days  Code Status: Full Code   Attending Physician: Shravan Langston MD   Primary Care Physician: Primary Doctor No  Expected Discharge Date:   Principal Problem:V-tach    Subjective:     Interval Hx: no cp/sob/palps/LH/LOC.  Had some abd discomfort, now resolved.  Case d/w RN and Dr. Morales.  Apparently, pt "lost" his previous lifevest in alf and will not be given another.  I further discussed case with Dr. Harrison at [facility] who has agreed to accept the pt for ICD placement.    Tele: SR 60s, PVCs, NSVT 3 beats (personally reviewed and interpreted)        Review of Systems   Gastrointestinal: Negative for melena.   Genitourinary: Negative for hematuria.     Objective:     Vital Signs (Most Recent):  Temp: 97.5 °F (36.4 °C) (08/02/19 0722)  Pulse: 60 (08/02/19 0722)  Resp: 18 (08/02/19 0722)  BP: (!) 141/96 (08/02/19 0722)  SpO2: (!) 93 % (08/02/19 0722) Vital Signs (24h Range):  Temp:  [97.3 °F (36.3 °C)-97.9 °F (36.6 °C)] 97.5 °F (36.4 °C)  Pulse:  [55-61] 60  Resp:  [13-26] 18  SpO2:  [93 %-97 %] 93 %  BP: (111-148)/(72-96) 141/96     Weight: 83 kg (182 lb 15.7 oz)  Body mass index is 29.53 kg/m².     SpO2: (!) 93 %  O2 Device (Oxygen Therapy): room air      Intake/Output Summary (Last 24 hours) at 8/2/2019 1016  Last data filed at 8/1/2019 1600  Gross per 24 hour   Intake 360 ml   Output --   Net 360 ml       Lines/Drains/Airways     Peripheral Intravenous Line                 Peripheral IV - Single Lumen 07/30/19 1914 18 G Right Forearm 2 days         Peripheral IV - Single Lumen 07/31/19 1325 22 G Anterior;Left Wrist 1 day                Physical Exam    Current Medications:   amiodarone  400 mg Oral BID    aspirin  81 mg Oral Daily    atorvastatin  80 mg Oral Daily    carvedilol  25 mg Oral BID    clopidogrel  75 mg Oral Daily    enoxaparin  1 " mg/kg Subcutaneous Q12H    furosemide  40 mg Oral BID    isosorbide mononitrate  60 mg Oral Daily    lisinopril  40 mg Oral Daily    mineral oil  1 enema Rectal Once    polyethylene glycol  17 g Oral Daily    spironolactone  25 mg Oral Daily       acetaminophen, cloNIDine, ondansetron, polyethylene glycol, prochlorperazine, promethazine (PHENERGAN) IVPB, ramelteon, senna-docusate 8.6-50 mg, simethicone    Laboratory (all labs reviewed):  CBC:  Recent Labs   Lab 07/25/19  1319 07/26/19  0203 07/27/19  0454 07/30/19 2001 07/31/19  0406   WBC 5.15 5.90 5.83 4.41 4.09   Hemoglobin 12.1 L 12.5 L 12.9 L 11.8 L 12.1 L   Hematocrit 37.1 L 38.4 L 39.8 L 36.7 L 38.0 L   Platelets 272 291 278 348 349       CHEMISTRIES:  Recent Labs   Lab 01/18/18  0254 01/19/18  0524 03/25/18  1752 07/25/19  1319 07/26/19  0203 07/30/19 2001 07/30/19 2039 07/31/19  0406   Glucose 111 H  --  90 89 106 98  --  96   Sodium 140  --  139 137 138 138  --  138   Potassium 3.4 L  --  3.2 L 4.3 3.6 4.0  --  3.8   BUN, Bld 22 H  --  15 20 20 24 H  --  23 H   Creatinine 1.3  --  1.5 H 1.5 H 1.6 H 1.6 H  --  1.5 H   eGFR if  >60  --  60 59 A 55 A 55 A  --  59 A   eGFR if non  >60  --  52 A 51 A 47 A 47 A  --  51 A   Calcium 9.5  --  9.6 8.8 9.0 9.0  --  8.9   Magnesium 1.8 1.7  --  1.9  --   --  2.1 2.0       CARDIAC BIOMARKERS:  Recent Labs   Lab 01/18/18  0008  07/25/19  1319  07/30/19 2001 07/30/19  2232 07/31/19  0406 07/31/19  1009 07/31/19 2243   CPK 84  --  235 H  235 H  --   --   --   --   --   --    CPK MB 2.1  --  3.2  --   --   --   --   --   --    Troponin I 1.291 H   < > 1.684 H   < > 1.885 H 1.955 H 1.821 H 1.823 H 1.743 H    < > = values in this interval not displayed.       COAGS:  Recent Labs   Lab 01/17/18  0636   INR 1.1       LIPIDS/LFTS:  Recent Labs   Lab 01/18/18  0254 03/25/18  1752 07/25/19  1319 07/26/19  0203 07/30/19 2001 07/30/19  2232 07/31/19  0406   Cholesterol 156  --   --   --    --  116 L  --    Triglycerides 81  --   --   --   --  71  --    HDL 38 L  --   --   --   --  11 L  --    LDL Cholesterol 101.8  --   --   --   --  90.8  --    Non-HDL Cholesterol 118  --   --   --   --  105  --    AST 29 20 43 H 38 35  --  33   ALT 33 32 34 36 43  --  44       BNP:  Recent Labs   Lab 01/17/18  0636 03/25/18  1752 07/30/19 2001   BNP 1,849 H 1,039 H 3,044 H       TSH:        Free T4:        Diagnostic Results:  Echo: 7/26/19  · Moderately decreased left ventricular systolic function. The estimated ejection fraction is 30%  · Moderate concentric left ventricular hypertrophy.  · Grade III (severe) left ventricular diastolic dysfunction consistent with restrictive physiology.  · Elevated left atrial pressure.  · Mild right ventricular enlargement.  · Moderate left atrial enlargement.  · Mild right atrial enlargement.  · Moderate-to-severe mitral regurgitation.  · Moderate tricuspid regurgitation.  · Mild pulmonic regurgitation.  · Intermediate central venous pressure (8 mm Hg).  · The estimated PA systolic pressure is 35 mm Hg  · Pulmonary hypertension present.    Cath: 7/26/19  1.  Severe triple-vessel coronary artery disease  2.  Patent BARAJAS to LAD, patent SVG to diagonal 1 and patent SVG to circumflex.  No culprit stenosis noted in these grafts  3.  Mid to distal RCA .  Distal RCA fills with collaterals from the left.  Unchanged from prior.  Findings/Key Components:  LVEDP: 19 mmHg  Dominance: Right   LM:  Patent with no significant stenosis  LAD:  Diffusely disease proximal to mid.  Fills with LIMA to LAD and SVG to diagonal 1  LCx:  Native vessel diffusely disease.  Fills with SVG to OM2  RCA:  Proximal 70% stenosis.  Distal .  Fills with collaterals from the left.  Hemostasis:  RFA 5 Ethiopian sheath.  Manual pressure held in the cath lab for hemostasis  Impression:  Severe triple-vessel coronary artery disease.  Patent LIMA to LAD, SVG to OM and SVG to diagonal.  No acute culprit lesion  "identified  Plan:  Aspirin 81 mg daily indefinitely  Plavix 75 mg daily for at least 1 year  Follow-up in Cardiology Clinic      Assessment and Plan:     * V-tach  Had 1 episode of nonsustained ventricular tachycardia approximately 25-30 seconds.  Resolved on its own.    Amio gtt transitioned to PO, no further sustained VT.  Pt prev had lifevest, but apparently "lost" it when he was incarcerated and cannot receive another.  Case d/w Dr. Harrison ([facility]) who agrees ICD is indicated, will plan transfer for placement as inpat.  Case d/w Dr. Olguin of [facility].    Elevated troponin  Recent angiogram did not reveal any significant culprit lesion.  Has severe triple-vessel disease with patent grafts.  Troponin flat.  No chest pains.  No acute ischemic changes on EKG.  Continue aspirin, Plavix.  No indication to repeat coronary angiogram.    CKD stage 3  Creat stable    Acute chest pain  Patient with known triple-vessel disease status post CABG came in with chest pains.  Currently chest pain-free.  The patient had a recent angiogram last week which showed no culprit lesion.    Troponins have not risen significantly from the level he had prior to that angiogram.    EKG does not show any acute ischemic changes.    He has severe triple-vessel disease was with RCA  with lot of small vessel disease which might be causing the recurrent chest pain.   Cont Imdur 30 mg daily to his regimen to be titrated up as needed for relief.    Currently no indication to repeat coronary angiogram.    Continue medical management with aspirin 81 mg daily, Plavix 75 mg daily, aggressive risk factor modifications as well as atorvastatin 80 mg daily.    Ischemic cardiomyopathy  LVEF 30%. On coreg and lisinopril. Currently euvolumic. Added spironolactone.  Goal to maximize medical therapy.    Coronary artery disease  Status post bypass.  As per CP section.     Mixed hyperlipidemia  Cont statin    Essential hypertension  Continue current rx. "         VTE Risk Mitigation (From admission, onward)        Ordered     enoxaparin injection 80 mg  Every 12 hours (non-standard times)      07/30/19 0713          Aftab Lemus MD  Cardiology  [facility]    Addendum 4pm:  Pt to be transferred to [facility] for ICD because no physician with the appropriate privileges is available to perform ICD implant at [facility] at this time and the patient is unable to obtain a Lifevest.

## 2019-08-03 NOTE — CONSULTS
[facility]  Cardiac Electrophysiology  Consult Note    Admission Date: 7/30/2019  Code Status: Full Code   Attending Provider: Kiel Garcia MD  Consulting Provider: Damon Flynn MD  Principal Problem:Nonsustained ventricular tachycardia    Inpatient consult to Electrophysiology  Consult performed by: Damon Flynn MD  Consult ordered by: Tim Figueroa DO        Subjective:     Chief Complaint:  palpitation and chest pain      HPI:   Mr. Abdoul Ochoa, 54 year old male with significant CAD; s/p CABG (1/2018 LIMA-LAD, SVG-OM and SVG- diagonal), prior stents and recent NSTEMI on 7/2019 when he underwent LHC and showed diffuse disease LAD; diffuse disease on LCX and  dRCA. Ischemic HFrEFTTE showed EF 30%. He presented to [facility] -on 7/30/2019- two day after his NSTEM (7/25/2019 - 7/28/2019) with recurrent anginal chest pain, which was similar to the one he had prior admission. He also had some associated palpitations and diaphoresis but denies any fevers, chills, nausea, vomiting, hemoptysis, nor any lower extremity pain or swelling. On 8/1/2019 around 1120 AM his Tele showed long run (30 beats) of wide complex tachycardia. Cardiology consulted and recommended Amiodarone infusion in the ICU and he finished the loading dose of Amiodarone and he did not have recurrent episode of these wide complexes tachycardia. Transferred to [facility] for evaluation of wide complex arrhythmia.     Past Medical History:   Diagnosis Date    Alcoholic intoxication without complication     Anemia in stage 3 chronic kidney disease 1/18/2018    CKD stage 3 7/31/2019    Coronary artery disease     Coronary artery disease of native artery of native heart with stable angina pectoris 1/18/2018    Essential hypertension 1/17/2018    History of CVA (cerebrovascular accident) 1/18/2018    Ischemic cardiomyopathy 7/27/2019    MI (myocardial infarction)     2013, 2015, 2016, 2018 (total of 4  "stents), triple CABG 2018    Mixed hyperlipidemia 2018    Nonsustained ventricular tachycardia 2019    Pneumonia of both lungs due to infectious organism        Past Surgical History:   Procedure Laterality Date    ANGIOGRAM, CORONARY, INCLUDING BYPASS GRAFT, WITH LEFT HEART CATHETERIZATION N/A 2019    Performed by [facility]wick [facility] at [facility] CATH LAB    BYPASS GRAFT      CARDIAC SURGERY  2018    three vessel CABG    CORONARY ANGIOPLASTY WITH STENT PLACEMENT      4 stents    TRACHEOSTOMY CLOSURE         Review of patient's allergies indicates:  No Known Allergies    No current facility-administered medications on file prior to encounter.      Current Outpatient Medications on File Prior to Encounter   Medication Sig    aspirin 81 MG Chew Take 81 mg by mouth once daily.    atorvastatin (LIPITOR) 80 MG tablet Take 1 tablet (80 mg total) by mouth once daily.    carvedilol (COREG) 25 MG tablet Take 1 tablet (25 mg total) by mouth 2 (two) times daily.    clopidogrel (PLAVIX) 75 mg tablet Take 1 tablet (75 mg total) by mouth once daily.    lisinopril (PRINIVIL,ZESTRIL) 40 MG tablet Take 1 tablet (40 mg total) by mouth once daily.    nitroGLYCERIN (NITROSTAT) 0.4 MG SL tablet Place 1 tablet (0.4 mg total) under the tongue every 5 (five) minutes as needed for Chest pain.     Family History     Problem Relation (Age of Onset)    Heart disease Mother, Father    Hypertension Mother, Father        Tobacco Use    Smoking status: Former Smoker     Types: Cigarettes     Last attempt to quit: 2014     Years since quittin.1    Smokeless tobacco: Never Used   Substance and Sexual Activity    Alcohol use: Not Currently     Frequency: Never     Comment: beer on the weekends "barely"    Drug use: No    Sexual activity: Yes     Partners: Female     Review of Systems   Constitution: Negative for chills and decreased appetite.   HENT: Negative for congestion.    Eyes: Negative for " blurred vision.   Cardiovascular: Positive for chest pain and dyspnea on exertion. Negative for leg swelling and palpitations.   Respiratory: Positive for shortness of breath. Negative for sleep disturbances due to breathing.    Endocrine: Negative for cold intolerance.   Gastrointestinal: Negative for bloating.   Genitourinary: Negative for bladder incontinence.   Neurological: Negative for aphonia.     Objective:     Vital Signs (Most Recent):  Temp: 98.4 °F (36.9 °C) (08/03/19 0453)  Pulse: 60 (08/03/19 0453)  Resp: 16 (08/03/19 0453)  BP: (!) 154/90 (08/03/19 0453)  SpO2: 95 % (08/03/19 0453) Vital Signs (24h Range):  Temp:  [96.4 °F (35.8 °C)-98.5 °F (36.9 °C)] 98.4 °F (36.9 °C)  Pulse:  [54-62] 60  Resp:  [16-20] 16  SpO2:  [90 %-98 %] 95 %  BP: (113-154)/(67-96) 154/90       Weight: 82 kg (180 lb 12.4 oz)  Body mass index is 29.18 kg/m².    SpO2: 95 %  O2 Device (Oxygen Therapy): nasal cannula    Physical Exam   Constitutional: He is oriented to person, place, and time. He appears well-nourished. No distress.   HENT:   Head: Normocephalic.   Eyes: Pupils are equal, round, and reactive to light.   Neck: No JVD present. No thyromegaly present.   Cardiovascular: Exam reveals no friction rub.   No murmur heard.  Pulmonary/Chest: Effort normal.   Abdominal: Soft. He exhibits distension.   Musculoskeletal: He exhibits no edema.   CABG Scar    Neurological: He is alert and oriented to person, place, and time.   Skin: Skin is warm. No erythema.   Vitals reviewed.                Assessment and Plan:     * Nonsustained ventricular tachycardia  - He is at higher risk of having V tach giving his underlying extensive coronary artery disease and reduced EF 30%.  - Rhythm strips reviewed and discussed   - Agree with Amiodarone and continue on loading phase (total of 10 gm) and continue on 400 mg daily as maintenance dose   - Daily electrolytes, Tele and we will discuss about ICD for prevention of future malignant arrhythmia    - Will be discuss with EP         Thank you for your consult. I will follow-up with patient. Please contact us if you have any additional questions.    Damon Flynn MD  Cardiac Electrophysiology  [facility]

## 2019-08-03 NOTE — ASSESSMENT & PLAN NOTE
-significant CAD history as noted in HPI  -recently admitted at OSH due to NSTEMI with troponin peak of 1.899 on 7/26/2019 at which time he had LHC that noted diffuse disease LAD, diffuse disease on LCX and  dRCA with plans for medical management  -presented again to [facility] on 7/30/19 due to recurrent anginal chest pain >>>  troponin noted to be similar to prior recent checks and medical management continued  -NSVT episode on 8/1 as noted above  -on arrival to [facility] he had CP that was relieved with SL NTG, he attributed chest pain to increased anxiety and PRN regimen initiated  -evaluated by EP with tentative plans for ICD placement on Monday  -continue amiodarone, ASA, statin, carvedilol, PO furosemide, lisinopril, and aldactone  -home ISMN up titrated due to anginal pain  -continue tele monitoring  -EKG and SL NTG PRN chest pain/arrhythmia   -cardiac diet in house  -Cardiac Rehab referral   -outpt follow up with Cardiology at [place]

## 2019-08-03 NOTE — ASSESSMENT & PLAN NOTE
- He is at higher risk of having V tach giving his underlying extensive coronary artery disease and reduced EF 30%.  - Rhythm strips reviewed and discussed   - Agree with Amiodarone and continue on loading phase (total of 10 gm) and continue on 400 mg daily as maintenance dose   - Daily electrolytes, Tele and we will discuss about ICD for prevention of future malignant arrhythmia   - Will be discuss with EP

## 2019-08-03 NOTE — NURSING
Pt arrived to unit via [facility] service. Pt and paramedic reports chest pain since leaving [facility]. Pt informed paramedic that he believes contributor to symptoms is anxiety. No shortness of breath noted at this time. Pt placed in bed. Admitting team paged ([phone] called). Call returned by admitting doctor and notified of chest pain.  No new orders received. Will continue to monitor closely. DO Carolina arrived to bedside @ 2209 and made aware of chest pain. Stated will order troponin and ekg.

## 2019-08-03 NOTE — PLAN OF CARE
Problem: Adult Inpatient Plan of Care  Goal: Plan of Care Review  Outcome: Ongoing (interventions implemented as appropriate)  Plan of care reviewed with pt. PRN valium continued. Pt remains free from chest pain at this time. Pt to go for AICD placement 8/5. Prn pain medication per pt request. Pt ambulates independently in room. Pt remains free from injury. No further questions at this time. Will continue to monitor.

## 2019-08-03 NOTE — ASSESSMENT & PLAN NOTE
-BP fairly controlled  -continue carvedilol, ISMN, lisinopril, and aldactone as noted above   -monitor

## 2019-08-03 NOTE — NURSING
Transferred to [facility] via ambulance with telemetry. In NSR at present denies chest pain or SOB. Given Tylenol 500 mg po for complaints of headache at 2006- still an 8/10. Patient has all his personal belongings.

## 2019-08-03 NOTE — NURSING
MICHELLE sheltond. Call returned by MD Carter. MD notified of of 3rd event of pt c/o of chest pain. Informed sublingual nitro given with relief. Informed that pt believes anxiety is causing his chest pain. MD stated he will order medication for anxiety. MD stated to give sublingual nitro at this time. Will continue to monitor closely.

## 2019-08-03 NOTE — CARE UPDATE
"Hospital Medicine Acceptance Note    This is a 55 y/o male with ischemic cardiomyopathy (EF 25-30%), essential hypertension, mixed hyperlipidemia (.8 Jan 2018), CAD s/p CABG & PCI, CKD stage 3, anemia of CKD stage 3, and history of CVA who was transferred from [facility] to [facility] for recurrent chest pain and NSVT for EP evaluation.     In short, patient was treated medically at OSH for CAD with ASA, plavix, and heparin. Due to recurrent NSVT, he was treated with IV/PO amiodarone and then transferred to [facility] for ICD placement.    Upon evaluation of patient at bedside, he complained of 9/10 sharp substernal chest pain that improved to 5/10 with nitroglycerin. He was not in an distress and did not feel like he was having a "heart attack." Chest palpation did not reproduce pain. EKG did not show ST seg or T wave changes. Troponin was lower than previously (1.74-->1.58).    Plan:  - Continue current medical management   - EP consult in AM for ICD placement   - NPO at midnight   - Consider increasing Imdur dose for chronic angina relief and maybe even adding Ranexa    "

## 2019-08-03 NOTE — NURSING
IM C on call paged. Call returned by DO Carolina. Notified of pt c/o of 10/10 chest pain and nausea. Informed that administered x1 sublingual nitroglycerin. Provider stated to administer 2nd dose of sublingual nitroglycerin due to continued chest pain after 5 minutes of administration of sublingual nitroglycerin. Provider stated will review chart. Will continue to monitor closely.

## 2019-08-03 NOTE — ASSESSMENT & PLAN NOTE
-chronic, stable  -no evidence of bleeding, H/H stable  -monitor periodically over hospital course

## 2019-08-03 NOTE — SUBJECTIVE & OBJECTIVE
Past Medical History:   Diagnosis Date    Alcoholic intoxication without complication     Anemia in stage 3 chronic kidney disease 1/18/2018    CKD stage 3 7/31/2019    Coronary artery disease     Coronary artery disease of native artery of native heart with stable angina pectoris 1/18/2018    Essential hypertension 1/17/2018    History of CVA (cerebrovascular accident) 1/18/2018    Ischemic cardiomyopathy 7/27/2019    MI (myocardial infarction)     2013, 2015, 2016, 2018 (total of 4 stents), triple CABG January 2018    Mixed hyperlipidemia 1/17/2018    Nonsustained ventricular tachycardia 7/31/2019    Pneumonia of both lungs due to infectious organism        Past Surgical History:   Procedure Laterality Date    ANGIOGRAM, CORONARY, INCLUDING BYPASS GRAFT, WITH LEFT HEART CATHETERIZATION N/A 7/26/2019    Performed by Chadwick Ramirez MD at [facility] CATH LAB    BYPASS GRAFT      CARDIAC SURGERY  01/2018    three vessel CABG    CORONARY ANGIOPLASTY WITH STENT PLACEMENT      4 stents    TRACHEOSTOMY CLOSURE  2014       Review of patient's allergies indicates:  No Known Allergies    No current facility-administered medications on file prior to encounter.      Current Outpatient Medications on File Prior to Encounter   Medication Sig    aspirin 81 MG Chew Take 81 mg by mouth once daily.    atorvastatin (LIPITOR) 80 MG tablet Take 1 tablet (80 mg total) by mouth once daily.    carvedilol (COREG) 25 MG tablet Take 1 tablet (25 mg total) by mouth 2 (two) times daily.    clopidogrel (PLAVIX) 75 mg tablet Take 1 tablet (75 mg total) by mouth once daily.    lisinopril (PRINIVIL,ZESTRIL) 40 MG tablet Take 1 tablet (40 mg total) by mouth once daily.    nitroGLYCERIN (NITROSTAT) 0.4 MG SL tablet Place 1 tablet (0.4 mg total) under the tongue every 5 (five) minutes as needed for Chest pain.     Family History     Problem Relation (Age of Onset)    Heart disease Mother, Father    Hypertension Mother, Father     "    Tobacco Use    Smoking status: Former Smoker     Types: Cigarettes     Last attempt to quit: 2014     Years since quittin.1    Smokeless tobacco: Never Used   Substance and Sexual Activity    Alcohol use: Not Currently     Frequency: Never     Comment: beer on the weekends "barely"    Drug use: No    Sexual activity: Yes     Partners: Female     Review of Systems   Constitution: Negative for chills and decreased appetite.   HENT: Negative for congestion.    Eyes: Negative for blurred vision.   Cardiovascular: Positive for chest pain and dyspnea on exertion. Negative for leg swelling and palpitations.   Respiratory: Positive for shortness of breath. Negative for sleep disturbances due to breathing.    Endocrine: Negative for cold intolerance.   Gastrointestinal: Negative for bloating.   Genitourinary: Negative for bladder incontinence.   Neurological: Negative for aphonia.     Objective:     Vital Signs (Most Recent):  Temp: 98.4 °F (36.9 °C) (19)  Pulse: 60 (19)  Resp: 16 (19)  BP: (!) 154/90 (19)  SpO2: 95 % (19) Vital Signs (24h Range):  Temp:  [96.4 °F (35.8 °C)-98.5 °F (36.9 °C)] 98.4 °F (36.9 °C)  Pulse:  [54-62] 60  Resp:  [16-20] 16  SpO2:  [90 %-98 %] 95 %  BP: (113-154)/(67-96) 154/90       Weight: 82 kg (180 lb 12.4 oz)  Body mass index is 29.18 kg/m².    SpO2: 95 %  O2 Device (Oxygen Therapy): nasal cannula    Physical Exam   Constitutional: He is oriented to person, place, and time. He appears well-nourished. No distress.   HENT:   Head: Normocephalic.   Eyes: Pupils are equal, round, and reactive to light.   Neck: No JVD present. No thyromegaly present.   Cardiovascular: Exam reveals no friction rub.   No murmur heard.  Pulmonary/Chest: Effort normal.   Abdominal: Soft. He exhibits distension.   Musculoskeletal: He exhibits no edema.   CABG Scar    Neurological: He is alert and oriented to person, place, and time.   Skin: Skin is " warm. No erythema.   Vitals reviewed.

## 2019-08-03 NOTE — ASSESSMENT & PLAN NOTE
-chronic, baseline 1.5-1.6  -on arrival to [facility] noted to be 1.8  -avoid nephrotoxins and hypotension as able, renally dose medications  -monitor

## 2019-08-03 NOTE — HPI
Mr. Abdoul Ochoa, 54 year old male with significant CAD; s/p CABG (1/2018 LIMA-LAD, SVG-OM and SVG- diagonal), prior stents and recent NSTEMI on 7/2019 when he underwent LHC and showed diffuse disease LAD; diffuse disease on LCX and  dRCA. Ischemic HFrEFTTE showed EF 30%. He presented to [facility] -on 7/30/2019- two day after his NSTEM (7/25/2019 - 7/28/2019) with recurrent anginal chest pain, which was similar to the one he had prior admission. He also had some associated palpitations and diaphoresis but denies any fevers, chills, nausea, vomiting, hemoptysis, nor any lower extremity pain or swelling. On 8/1/2019 around 1120 AM his Tele showed long run (30 beats) of wide complex tachycardia. Cardiology consulted and recommended Amiodarone infusion in the ICU and he finished the loading dose of Amiodarone and he did not have recurrent episode of these wide complexes tachycardia. Transferred to [facility] for evaluation of wide complex arrhythmia.

## 2019-08-03 NOTE — NURSING
Pt c/o of 10/10 right upper chest pain that is sharp with an onset 2-3 hours ago that has not stopped. No shortness of breath noted and pt denies. Will administer sublingual nitro. Will continue to monitor closely.

## 2019-08-03 NOTE — NURSING
Received bedside handoff per Tiera. Patient in no distress. Discussed plan of care with patient ,verbalized good understanding and agreement with plan
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
## 2019-08-03 NOTE — ASSESSMENT & PLAN NOTE
-transferred 8/2 due to NSTEMI with [facility] for EP evaluation  -on arrival he had CP that was relieved with SL NTG, he attributed chest pain to increased anxiety and PRN regimen initiated  -evaluated by EP with tentative plans for ICD placement on Monday  -continue amiodarone and carvedilol  -continue tele monitoring  -EKG and SL NTG PRN chest pain/arrhythmia   -cardiac diet in house  -outpt follow up with EP at [facility]

## 2019-08-04 PROBLEM — R10.13 EPIGASTRIC PAIN: Status: ACTIVE | Noted: 2019-01-01

## 2019-08-04 NOTE — SUBJECTIVE & OBJECTIVE
Interval History: Resting in bed, reports earlier epigastric pain is improving. He denies chest pain or shortness of breath, denies palpitations. Understands plan of care.     Review of Systems   Constitutional: Negative for activity change, appetite change, chills, diaphoresis, fatigue, fever and unexpected weight change.   HENT: Negative for congestion, sore throat and trouble swallowing.    Respiratory: Negative for cough, chest tightness and wheezing.    Cardiovascular: Negative for chest pain and leg swelling.   Gastrointestinal: Positive for abdominal pain (mid epigastric; now improved) and nausea. Negative for abdominal distention, constipation, diarrhea and vomiting.   Genitourinary: Negative for decreased urine volume, difficulty urinating, dysuria and urgency.   Musculoskeletal: Negative for arthralgias, back pain, gait problem and joint swelling.   Skin: Negative for rash and wound.   Neurological: Negative for dizziness, syncope, weakness, light-headedness and headaches.   Psychiatric/Behavioral: The patient is not nervous/anxious.      Objective:     Vital Signs (Most Recent):  Temp: 97.5 °F (36.4 °C) (08/04/19 1525)  Pulse: 60 (08/04/19 1614)  Resp: 14 (08/04/19 1525)  BP: 120/76 (08/04/19 1525)  SpO2: 96 % (08/04/19 1525) Vital Signs (24h Range):  Temp:  [96.1 °F (35.6 °C)-98.5 °F (36.9 °C)] 97.5 °F (36.4 °C)  Pulse:  [52-63] 60  Resp:  [14-20] 14  SpO2:  [92 %-99 %] 96 %  BP: (112-169)/() 120/76     Weight: 81.6 kg (179 lb 14.3 oz)  Body mass index is 29.04 kg/m².    Intake/Output Summary (Last 24 hours) at 8/4/2019 1805  Last data filed at 8/4/2019 0800  Gross per 24 hour   Intake 300 ml   Output 501 ml   Net -201 ml      Physical Exam   Constitutional: He is oriented to person, place, and time. He appears well-developed and well-nourished. No distress.   HENT:   Head: Normocephalic and atraumatic.   Mouth/Throat: No oropharyngeal exudate.   Eyes: EOM are normal. Right eye exhibits no  discharge. Left eye exhibits no discharge. No scleral icterus.   Neck: Normal range of motion. No JVD present.   Cardiovascular: Normal rate, regular rhythm, normal heart sounds and intact distal pulses. Exam reveals no friction rub.   No murmur heard.  Pulmonary/Chest: Effort normal and breath sounds normal. No respiratory distress. He has no wheezes. He has no rales.   Abdominal: Soft. Bowel sounds are normal. He exhibits no distension. There is no tenderness. There is no guarding. No hernia.   Musculoskeletal: Normal range of motion. He exhibits no edema.   Neurological: He is alert and oriented to person, place, and time.   Skin: Skin is warm and dry. He is not diaphoretic. No erythema.   Large healed skin graft site noted to left calf.    Psychiatric: He has a normal mood and affect. His behavior is normal. Judgment and thought content normal.   Nursing note and vitals reviewed.    Significant Labs:   CBC:   Recent Labs   Lab 08/03/19  0820 08/04/19  0330   WBC 4.97 5.23   HGB 11.3* 11.4*   HCT 35.8* 37.2*    307     CMP:   Recent Labs   Lab 08/03/19  0820 08/04/19  0330   * 139   K 4.5 4.8    102   CO2 22* 26   GLU 86 103   BUN 25* 27*   CREATININE 1.8* 2.1*   CALCIUM 9.2 9.6   ANIONGAP 9 11   EGFRNONAA 41.2* 34.2*     Magnesium:   Recent Labs   Lab 08/03/19  0820 08/04/19  0330   MG 2.0 2.0       Significant Imaging: I have reviewed all pertinent imaging results/findings within the past 24 hours.

## 2019-08-04 NOTE — PLAN OF CARE
Problem: Adult Inpatient Plan of Care  Goal: Plan of Care Review  Outcome: Ongoing (interventions implemented as appropriate)  Plan for possible ICD placement on Monday 5/8. Complaints or nausea and pain treated with PRN medications. VSS. Pt denies SOB or chest pain. Pt remain on telemetry; NS on telemetry. Fall precautions maintained. Pt free of falls and injuries. POC discussed with patient/family, verbalized understanding. Questions answered, no distress at present.

## 2019-08-04 NOTE — ASSESSMENT & PLAN NOTE
- Ischemic CMP from prior CAD and recent NSTEMI with MMVT  - Currently no recurrent episode of V Tach and rate ~ 58-65 BPM   - Current Rate/Rhythm medications: Amiodarone 400 PO BID; Coreg 25 mg PO BID - Continue   - Plan for Dual Chamber ICD on 8/5/2019 by Dr. Harrison.

## 2019-08-04 NOTE — PROGRESS NOTES
Pt complains of mid-upper gastric pain 9/10, an aching pain; denies chest pain or shortness of breath. Unrelieved by tylenol requesting different pain medications. Last /89. VSS. MD Machado notified, awaiting new orders.
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
## 2019-08-04 NOTE — PROGRESS NOTES
08/04/19 0812   Vital Signs   BP (!) 169/113   MAP (mmHg) 134   BP Method Automatic     Paged LASHONDA Alcala will continue to monitor. scheduled medications given. LASHONDA Alcala further orders are to take BP again. After morning medications given /73 MAP 87. LASHONDA Alcala aware. Will continue to monitor.

## 2019-08-04 NOTE — PROGRESS NOTES
Upon arrival to pt room, pt was grasping the sides of the bed tightly and had furrowed eyebrows stating that his stomach hurt 9/10. GI cocktail and tylenol tried overnight to relieve pain. Last BM 8/3. Valium administered around 0700. LASHONDA Alcala aware. Further orders are to administer zofran before breakfast. No further orders at this time. Will continue to monitor.    0820: pt refused zofran at this time. Pt eating breakfast will continue to monitor.
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
## 2019-08-04 NOTE — SUBJECTIVE & OBJECTIVE
Interval History: No acute events overnight; Tele was on NSR rate 55-60 BPM. No episode of V Tach. Denies chest pain, shortness of breath.    Review of Systems   Constitution: Negative for chills and decreased appetite.   HENT: Negative for congestion.    Eyes: Negative for blurred vision.   Cardiovascular: Positive for dyspnea on exertion. Negative for chest pain, leg swelling and palpitations.   Respiratory: Negative for shortness of breath and sleep disturbances due to breathing.    Endocrine: Negative for cold intolerance.   Gastrointestinal: Negative for bloating.   Genitourinary: Negative for bladder incontinence.   Neurological: Negative for aphonia.     Objective:     Vital Signs (Most Recent):  Temp: 98.5 °F (36.9 °C) (08/04/19 0359)  Pulse: (!) 58 (08/04/19 0600)  Resp: 16 (08/04/19 0359)  BP: 130/89 (08/04/19 0359)  SpO2: 95 % (08/04/19 0359) Vital Signs (24h Range):  Temp:  [96.3 °F (35.7 °C)-98.5 °F (36.9 °C)] 98.5 °F (36.9 °C)  Pulse:  [54-65] 58  Resp:  [14-20] 16  SpO2:  [92 %-99 %] 95 %  BP: (110-149)/() 130/89     Weight: 81.6 kg (179 lb 14.3 oz)  Body mass index is 29.04 kg/m².     SpO2: 95 %  O2 Device (Oxygen Therapy): nasal cannula    Physical Exam   Constitutional: He is oriented to person, place, and time. He appears well-nourished. No distress.   HENT:   Head: Normocephalic.   Eyes: Pupils are equal, round, and reactive to light.   Neck: No JVD present. No thyromegaly present.   Cardiovascular: Exam reveals no friction rub.   No murmur heard.  Pulmonary/Chest: Effort normal.   Abdominal: Soft. He exhibits no distension.   Musculoskeletal: He exhibits no edema.   CABG Scar    Neurological: He is alert and oriented to person, place, and time.   Skin: Skin is warm. No erythema.   Vitals reviewed.      Significant Labs:   CMP:   Recent Labs   Lab 08/03/19  0820 08/04/19  0330   * 139   K 4.5 4.8    102   CO2 22* 26   GLU 86 103   BUN 25* 27*   CREATININE 1.8* 2.1*   CALCIUM 9.2  9.6   ANIONGAP 9 11   ESTGFRAFRICA 47.6* 39.5*   EGFRNONAA 41.2* 34.2*   , CBC:   Recent Labs   Lab 08/03/19  0820 08/04/19  0330   WBC 4.97 5.23   HGB 11.3* 11.4*   HCT 35.8* 37.2*    307   , Troponin   Recent Labs   Lab 08/02/19  2217   TROPONINI 1.580*    and All pertinent lab results from the last 24 hours have been reviewed.    Significant Imaging: Echocardiogram:   2D echo with color flow doppler:   Moderately decreased left ventricular systolic function. The estimated   ejection fraction is 30%  · Moderate concentric left ventricular hypertrophy.  · Grade III (severe) left ventricular diastolic dysfunction consistent   with restrictive physiology.  · Elevated left atrial pressure.  · Mild right ventricular enlargement.  · Moderate left atrial enlargement.  · Mild right atrial enlargement.  · Moderate-to-severe mitral regurgitation.  · Moderate tricuspid regurgitation.  · Mild pulmonic regurgitation.  · Intermediate central venous pressure (8 mm Hg).  · The estimated PA systolic pressure is 35 mm Hg  · Pulmonary hypertension present.     and EKG: NSR

## 2019-08-04 NOTE — PROGRESS NOTES
[facility]  Cardiac Electrophysiology  Progress Note    Admission Date: 7/30/2019  Code Status: Full Code   Attending Physician: Kiel Garcia MD   Expected Discharge Date:   Principal Problem:Sustained ventricular tachycardia    Subjective:     Interval History: No acute events overnight; had some epigastric pain (which is different than his prior ACS pain); was able to exert himself yesterday (active walking for about 5 minutes) with no limitations. Tele was on NSR rate 55-60 BPM. No episode of V Tach. Denies chest pain, shortness of breath.    Review of Systems   Constitution: Negative for chills and decreased appetite.   HENT: Negative for congestion.    Eyes: Negative for blurred vision.   Cardiovascular: Positive for dyspnea on exertion. Negative for chest pain, leg swelling and palpitations.   Respiratory: Negative for shortness of breath and sleep disturbances due to breathing.    Endocrine: Negative for cold intolerance.   Gastrointestinal: Negative for bloating.   Genitourinary: Negative for bladder incontinence.   Neurological: Negative for aphonia.     Objective:     Vital Signs (Most Recent):  Temp: 98.5 °F (36.9 °C) (08/04/19 0359)  Pulse: (!) 58 (08/04/19 0600)  Resp: 16 (08/04/19 0359)  BP: 130/89 (08/04/19 0359)  SpO2: 95 % (08/04/19 0359) Vital Signs (24h Range):  Temp:  [96.3 °F (35.7 °C)-98.5 °F (36.9 °C)] 98.5 °F (36.9 °C)  Pulse:  [54-65] 58  Resp:  [14-20] 16  SpO2:  [92 %-99 %] 95 %  BP: (110-149)/() 130/89     Weight: 81.6 kg (179 lb 14.3 oz)  Body mass index is 29.04 kg/m².     SpO2: 95 %  O2 Device (Oxygen Therapy): nasal cannula    Physical Exam   Constitutional: He is oriented to person, place, and time. He appears well-nourished. No distress.   HENT:   Head: Normocephalic.   Eyes: Pupils are equal, round, and reactive to light.   Neck: No JVD present. No thyromegaly present.   Cardiovascular: Exam reveals no friction rub.   No murmur  heard.  Pulmonary/Chest: Effort normal.   Abdominal: Soft. He exhibits no distension.   Musculoskeletal: He exhibits no edema.   CABG Scar    Neurological: He is alert and oriented to person, place, and time.   Skin: Skin is warm. No erythema.   Vitals reviewed.      Significant Labs:   CMP:   Recent Labs   Lab 08/03/19  0820 08/04/19  0330   * 139   K 4.5 4.8    102   CO2 22* 26   GLU 86 103   BUN 25* 27*   CREATININE 1.8* 2.1*   CALCIUM 9.2 9.6   ANIONGAP 9 11   ESTGFRAFRICA 47.6* 39.5*   EGFRNONAA 41.2* 34.2*   , CBC:   Recent Labs   Lab 08/03/19  0820 08/04/19  0330   WBC 4.97 5.23   HGB 11.3* 11.4*   HCT 35.8* 37.2*    307   , Troponin   Recent Labs   Lab 08/02/19  2217   TROPONINI 1.580*    and All pertinent lab results from the last 24 hours have been reviewed.    Significant Imaging: Echocardiogram:   2D echo with color flow doppler:   Moderately decreased left ventricular systolic function. The estimated   ejection fraction is 30%  · Moderate concentric left ventricular hypertrophy.  · Grade III (severe) left ventricular diastolic dysfunction consistent   with restrictive physiology.  · Elevated left atrial pressure.  · Mild right ventricular enlargement.  · Moderate left atrial enlargement.  · Mild right atrial enlargement.  · Moderate-to-severe mitral regurgitation.  · Moderate tricuspid regurgitation.  · Mild pulmonic regurgitation.  · Intermediate central venous pressure (8 mm Hg).  · The estimated PA systolic pressure is 35 mm Hg  · Pulmonary hypertension present.     and EKG: NSR       Assessment and Plan:     * Sustained Monomorphic Ventricular Tachycardia  - Ischemic CMP from prior CAD and recent NSTEMI with MMVT  - Currently no recurrent episode of V Tach and rate ~ 58-65 BPM   - Current Rate/Rhythm medications: Amiodarone 400 PO BID; Coreg 25 mg PO BID - Continue   - Plan for A-ICD on 8/5/2019 by Dr. Harrison  - NPO midnight, hold DVT PPx     Damon Flynn MD  Cardiac  Electrophysiology  [facility]

## 2019-08-05 NOTE — BRIEF OP NOTE
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 Prepped and draped in sterile fashion. Safety timeout was performed. Sedation administered by anesthesia staff. Lidocaine used for local anesthetic. Fluoroscopic guided extra thoracic axillary access utilized. Guide/J Wire advanced and confirmed in IVC. Incision made. Blunt and electrocautery dissection performed. Pocket made and washed with antibiotic solution. Peel away sheath advanced over J wire. ICD lead advanced into RV after confirming in the IVC first under fluoroscopy. R waves and injury pattern adequate. Lead fixed into place and sutured in place. Pocket washed again using antibiotic solution. Lead connected to generator. Generator placed into pocket and washed with antibiotic solution. Deep layer closed with interrupted 3-0 suture. Intermediate layer closed with running 3-0 suture. Superficial layer closed with running 4-0 suture. Skin closed with Dermabond. Meplex dressing to be placed after dermabond has dried.     EBL: <10 mL    Specimens Removed: None  Complications: no immediate    1. Ancef 1 gram q8 hours x 2 doses (ordered)  2. Sling to left arm - wear for 48 hours, then only at night for 6 weeks.  3. NO HEPARIN PRODUCTS  4. Keflex 500 mg TID for 5 days at discharge (or after the 2 doses of IV antibiotics if still in the hospital)  5. No lifting left elbow above shoulder height  6. No lifting over 5 pounds  7. No driving for 1 week and for 4 weeks if patient uses left arm to make turns  8. Dressing will be removed in AM by EP  9. Chest Xray (ordered)  10. Follow up in device clinic in 1 week for device and wound checks.     Dr Harrison, the attending electrophysiologist, was present for the entirety of this procedure.    Abhinav  MD Adam PGY7

## 2019-08-05 NOTE — ASSESSMENT & PLAN NOTE
-significant CAD history as noted in HPI  -recently admitted at OSH due to NSTEMI with troponin peak of 1.899 on 7/26/2019 at which time he had LHC that noted diffuse disease LAD, diffuse disease on LCX and  dRCA with plans for medical management  -presented again to [facility] on 7/30/19 due to recurrent anginal chest pain >>>  troponin noted to be similar to prior recent checks and medical management continued  -NSVT episode on 8/1 as noted above  -on arrival to [facility] he had CP that was relieved with SL NTG, he attributed chest pain to increased anxiety and PRN regimen initiated  -evaluated by EP with tentative plans for ICD placement today  -continue amiodarone, ASA, statin, carvedilol, PO furosemide, lisinopril, and aldactone  -home ISMN up titrated due to anginal pain  -continue tele monitoring  -EKG and SL NTG PRN chest pain/arrhythmia   -cardiac diet in house  -Cardiac Rehab referral   -outpt follow up with Cardiology at [facility]

## 2019-08-05 NOTE — ASSESSMENT & PLAN NOTE
-transferred 8/2 due to NSTEMI with [facility] for EP evaluation  -on arrival he had CP that was relieved with SL NTG, he attributed chest pain to increased anxiety and PRN regimen initiated  -evaluated by EP with tentative plans for ICD placement today  -continue amiodarone and carvedilol  -continue tele monitoring  -EKG and SL NTG PRN chest pain/arrhythmia   -cardiac diet in house  -outpt follow up with EP at [facility]

## 2019-08-05 NOTE — ANESTHESIA PREPROCEDURE EVALUATION
08/05/2019  Pre-operative evaluation for Procedure(s) (LRB):  INSERTION, PULSE GENERATOR, ICD, SINGLE CHAMBER (Left)    Abdoul Ochoa is a 54 y.o. male ICM s/p CABGx3 2018, multiple PCI's (EF 30%, mod-severe MR), CVA, CKD3.    Patient Active Problem List   Diagnosis    Essential hypertension    Mixed hyperlipidemia    Tobacco abuse    History of CVA (cerebrovascular accident)    Coronary artery disease of native artery of native heart with stable angina pectoris    Anemia in stage 3 chronic kidney disease    Ischemic cardiomyopathy    Acute chest pain    CKD stage 3    Elevated troponin    Sustained Monomorphic Ventricular Tachycardia    Epigastric pain       Review of patient's allergies indicates:  No Known Allergies    No current facility-administered medications on file prior to encounter.      Current Outpatient Medications on File Prior to Encounter   Medication Sig Dispense Refill    aspirin 81 MG Chew Take 81 mg by mouth once daily.      atorvastatin (LIPITOR) 80 MG tablet Take 1 tablet (80 mg total) by mouth once daily. 30 tablet 11    carvedilol (COREG) 25 MG tablet Take 1 tablet (25 mg total) by mouth 2 (two) times daily. 60 tablet 11    clopidogrel (PLAVIX) 75 mg tablet Take 1 tablet (75 mg total) by mouth once daily. 30 tablet 11    lisinopril (PRINIVIL,ZESTRIL) 40 MG tablet Take 1 tablet (40 mg total) by mouth once daily. 30 tablet 6    nitroGLYCERIN (NITROSTAT) 0.4 MG SL tablet Place 1 tablet (0.4 mg total) under the tongue every 5 (five) minutes as needed for Chest pain. 30 tablet 2       Past Surgical History:   Procedure Laterality Date    ANGIOGRAM, CORONARY, INCLUDING BYPASS GRAFT, WITH LEFT HEART CATHETERIZATION N/A 7/26/2019    Per[facility] by [facility] CATH LAB    BYPASS GRAFT      CARDIAC SURGERY  01/2018    three vessel CABG    CORONARY ANGIOPLASTY  "WITH STENT PLACEMENT      4 stents    TRACHEOSTOMY CLOSURE         Social History     Socioeconomic History    Marital status: Single     Spouse name: Not on file    Number of children: Not on file    Years of education: Not on file    Highest education level: Not on file   Occupational History    Not on file   Social Needs    Financial resource strain: Not on file    Food insecurity:     Worry: Not on file     Inability: Not on file    Transportation needs:     Medical: Not on file     Non-medical: Not on file   Tobacco Use    Smoking status: Former Smoker     Types: Cigarettes     Last attempt to quit: 2014     Years since quittin.1    Smokeless tobacco: Never Used   Substance and Sexual Activity    Alcohol use: Not Currently     Frequency: Never     Comment: beer on the weekends "barely"    Drug use: No    Sexual activity: Yes     Partners: Female   Lifestyle    Physical activity:     Days per week: Not on file     Minutes per session: Not on file    Stress: Not on file   Relationships    Social connections:     Talks on phone: Not on file     Gets together: Not on file     Attends Hoahaoism service: Not on file     Active member of club or organization: Not on file     Attends meetings of clubs or organizations: Not on file     Relationship status: Not on file   Other Topics Concern    Not on file   Social History Narrative    Not on file         CBC:   Recent Labs     19  0330 19  0632   WBC 5.23 5.10  5.10   RBC 4.11* 4.02*  4.02*   HGB 11.4* 11.3*  11.3*   HCT 37.2* 35.5*  35.5*    299  299   MCV 91 88  88   MCH 27.7 28.1  28.1   MCHC 30.6* 31.8*  31.8*       CMP:   Recent Labs     19  0330 19  0632    138  138   K 4.8 4.5  4.5    103  103   CO2 26 25  25   BUN 27* 25*  25*   CREATININE 2.1* 1.9*  1.9*    90  90   MG 2.0 2.1   CALCIUM 9.6 9.3  9.3       INR  Recent Labs     19  0632   INR 1.1   APTT 25.1 "           Diagnostic Studies:      EKD Echo:  Results for orders placed or performed during the hospital encounter of 18   2D echo with color flow doppler   Result Value Ref Range    QEF 20 (A) 55 - 65    Mitral Valve Regurgitation MODERATE TO SEVERE (A)     Est. PA Systolic Pressure 45.45 (A)     Pericardial Effusion NONE     Tricuspid Valve Regurgitation MILD TO MODERATE          Anesthesia Evaluation    I have reviewed the Patient Summary Reports.        Review of Systems  Anesthesia Hx:  History of prior surgery of interest to airway management or planning: Denies Family Hx of Anesthesia complications.   Denies Personal Hx of Anesthesia complications.       Physical Exam  General:  Well nourished    Airway/Jaw/Neck:  Airway Findings: Mouth Opening: Normal Tongue: Normal  General Airway Assessment: Adult  Mallampati: III  TM Distance: Normal, at least 6 cm  Jaw/Neck Findings:  Neck ROM: Normal ROM      Dental:  Dental Findings: Periodontal disease, Severe    Chest/Lungs:  Chest/Lungs Findings: Clear to auscultation, Normal Respiratory Rate         Mental Status:  Mental Status Findings:  Cooperative, Alert and Oriented         Anesthesia Plan  Type of Anesthesia, risks & benefits discussed:  Anesthesia Type:  general  Patient's Preference:   Intra-op Monitoring Plan: standard ASA monitors  Intra-op Monitoring Plan Comments:   Post Op Pain Control Plan: multimodal analgesia  Post Op Pain Control Plan Comments:   Induction:   IV  Beta Blocker:  Patient is on a Beta-Blocker and has received one dose within the past 24 hours (No further documentation required).       Informed Consent: Patient understands risks and agrees with Anesthesia plan.  Questions answered. Anesthesia consent signed with patient.  ASA Score: 3     Day of Surgery Review of History & Physical:    H&P update referred to the surgeon.         Ready For Surgery From Anesthesia Perspective.

## 2019-08-05 NOTE — PROGRESS NOTES
08/05/19 1849   Vital Signs   Temp 97.7 °F (36.5 °C)   Pulse 75   Resp 14   SpO2 95 %   BP (!) 138/91   pt arrived to the floor. Bedrest maintained per orders, tele on pt, bed at 30 degrees, ice pack to site. Pt swallowed clear liquids without difficulty. No further questions at this time.will continue to monitor.

## 2019-08-05 NOTE — ASSESSMENT & PLAN NOTE
-nonspecific, non tender >>> now resolved  -amylase and lipase WNL  -abd US without acute pathology however noted nonspecific diffuse gallbladder wall thickening  -tolerating PO intake

## 2019-08-05 NOTE — PLAN OF CARE
Problem: Adult Inpatient Plan of Care  Goal: Plan of Care Review  Outcome: Ongoing (interventions implemented as appropriate)  Plan of care reviewed with pt. PRN valium continued. Abdominal US on 8/5 due to epigastric pain. No complaints today of epigastric pain. Pt remains free from chest pain at this time. Pt to go for AICD placement 8/5. Prn pain medication per pt request. Pt ambulates independently in room. Pt remains free from injury. No further questions at this time. Will continue to monitor.

## 2019-08-05 NOTE — SUBJECTIVE & OBJECTIVE
Interval History: Resting in bed, denies needs or complaints, understands plan of care. He denies chest pain or shortness of breath, denies palpitations. Understands plan of care.     Review of Systems   Constitutional: Negative for activity change, appetite change, chills, diaphoresis, fatigue, fever and unexpected weight change.   HENT: Negative for congestion, sore throat and trouble swallowing.    Respiratory: Negative for cough, chest tightness and wheezing.    Cardiovascular: Negative for chest pain and leg swelling.   Gastrointestinal: Negative for abdominal distention, abdominal pain (now improved), constipation, diarrhea, nausea and vomiting.   Genitourinary: Negative for decreased urine volume, difficulty urinating, dysuria and urgency.   Musculoskeletal: Negative for arthralgias, back pain, gait problem and joint swelling.   Skin: Negative for rash and wound.   Neurological: Negative for dizziness, syncope, weakness, light-headedness and headaches.   Psychiatric/Behavioral: The patient is not nervous/anxious.      Objective:     Vital Signs (Most Recent):  Temp: 98.4 °F (36.9 °C) (08/05/19 1109)  Pulse: (!) 54 (08/05/19 1142)  Resp: 18 (08/05/19 1109)  BP: 124/84 (08/05/19 1109)  SpO2: 95 % (08/05/19 1109) Vital Signs (24h Range):  Temp:  [97.4 °F (36.3 °C)-98.4 °F (36.9 °C)] 98.4 °F (36.9 °C)  Pulse:  [54-63] 54  Resp:  [14-20] 18  SpO2:  [88 %-96 %] 95 %  BP: (118-147)/(76-94) 124/84     Weight: 80.6 kg (177 lb 11.1 oz)  Body mass index is 28.68 kg/m².    Intake/Output Summary (Last 24 hours) at 8/5/2019 1510  Last data filed at 8/5/2019 0800  Gross per 24 hour   Intake 360 ml   Output --   Net 360 ml      Physical Exam   Constitutional: He is oriented to person, place, and time. He appears well-developed and well-nourished. No distress.   HENT:   Head: Normocephalic and atraumatic.   Mouth/Throat: No oropharyngeal exudate.   Eyes: EOM are normal. Right eye exhibits no discharge. Left eye exhibits no  discharge. No scleral icterus.   Neck: Normal range of motion. No JVD present.   Cardiovascular: Normal rate, regular rhythm, normal heart sounds and intact distal pulses. Exam reveals no friction rub.   No murmur heard.  Pulmonary/Chest: Effort normal and breath sounds normal. No respiratory distress. He has no wheezes. He has no rales.   Abdominal: Soft. Bowel sounds are normal. He exhibits no distension. There is no tenderness. There is no guarding. No hernia.   Musculoskeletal: Normal range of motion. He exhibits no edema.   Neurological: He is alert and oriented to person, place, and time.   Skin: Skin is warm and dry. He is not diaphoretic. No erythema.   Large healed skin graft site noted to left calf.    Psychiatric: He has a normal mood and affect. His behavior is normal. Judgment and thought content normal.   Nursing note and vitals reviewed.    Significant Labs:   CBC:   Recent Labs   Lab 08/04/19  0330 08/05/19  0632   WBC 5.23 5.10  5.10   HGB 11.4* 11.3*  11.3*   HCT 37.2* 35.5*  35.5*    299  299     CMP:   Recent Labs   Lab 08/04/19  0330 08/05/19  0632    138  138   K 4.8 4.5  4.5    103  103   CO2 26 25  25    90  90   BUN 27* 25*  25*   CREATININE 2.1* 1.9*  1.9*   CALCIUM 9.6 9.3  9.3   ANIONGAP 11 10  10   EGFRNONAA 34.2* 38.6*  38.6*     Magnesium:   Recent Labs   Lab 08/04/19  0330 08/05/19  0632   MG 2.0 2.1       Significant Imaging: I have reviewed all pertinent imaging results/findings within the past 24 hours.

## 2019-08-05 NOTE — PLAN OF CARE
CM not in room - per note pt is receiving A-ICD on 8/5/2019 by Dr. Harrison. CM will attempt to assess pt later. Pt was transferred from [facility]- 8/2/19.     08/05/19 1524   Discharge Assessment   Assessment Type Discharge Planning Assessment
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
## 2019-08-05 NOTE — PLAN OF CARE
Problem: Adult Inpatient Plan of Care  Goal: Plan of Care Review  Outcome: Ongoing (interventions implemented as appropriate)  NPO at MD for ICD placement. Chest clipped and prepped and Hibiclens bath performed. Anxiety treated with PRN medications. VSS. Pt denies SOB or chest pain. Pt remain on telemetry; NS on telemetry. Fall precautions maintained. Pt free of falls and injuries. POC discussed with patient/family, verbalized understanding. Questions answered, no distress at present.

## 2019-08-05 NOTE — ASSESSMENT & PLAN NOTE
-chronic, baseline 1.5-1.6  -on arrival to [facility] noted to be 1.8 with trend 1.8-2.1  -avoid nephrotoxins and hypotension as able, renally dose medications  -monitor

## 2019-08-06 NOTE — ANESTHESIA POSTPROCEDURE EVALUATION
Anesthesia Post Evaluation    Patient: Abdoul Ochoa    Procedure(s) Performed: Procedure(s) (LRB):  INSERTION, PULSE GENERATOR, ICD, SINGLE CHAMBER (Left)    Final Anesthesia Type: general  Patient location during evaluation: PACU  Patient participation: Yes- Able to Participate  Level of consciousness: awake and alert and oriented  Post-procedure vital signs: reviewed and stable  Pain management: adequate  Airway patency: patent  PONV status at discharge: No PONV  Anesthetic complications: no      Cardiovascular status: hemodynamically stable  Respiratory status: unassisted  Hydration status: euvolemic  Follow-up not needed.          Vitals Value Taken Time   /68 8/6/2019 11:45 AM   Temp 36.3 °C (97.4 °F) 8/6/2019 11:45 AM   Pulse 53 8/6/2019 11:45 AM   Resp 18 8/6/2019 11:45 AM   SpO2 98 % 8/6/2019 11:45 AM         Event Time     Out of Recovery 08/05/2019 18:36:41          Pain/Ondina Score: Pain Rating Prior to Med Admin: 8 (8/6/2019  5:44 AM)  Ondina Score: 10 (8/5/2019  5:15 PM)

## 2019-08-06 NOTE — PLAN OF CARE
08/06/19 1535   Final Note   Assessment Type Final Discharge Note   Anticipated Discharge Disposition Home   Hospital Follow Up  Appt(s) scheduled? Yes

## 2019-08-06 NOTE — PLAN OF CARE
08/06/19 0815   Discharge Assessment   Assessment Type Discharge Planning Assessment   Assessment information obtained from? Medical Record   Prior to hospitilization cognitive status: Alert/Oriented   Prior to hospitalization functional status: Independent   Current cognitive status: Alert/Oriented   Current Functional Status: Independent   Facility Arrived From: [facility]   Lives With sibling(s)   Able to Return to Prior Arrangements yes   Is patient able to care for self after discharge? Unable to determine at this time (comments)   Patient's perception of discharge disposition home or selfcare   Readmission Within the Last 30 Days no previous admission in last 30 days   Patient currently being followed by outpatient case management? No   Patient currently receives any other outside agency services? No   Equipment Currently Used at Home none   Do you have any problems affording any of your prescribed medications? TBD   Is the patient taking medications as prescribed? yes   Does the patient have transportation home? Yes   Transportation Anticipated family or friend will provide   Does the patient receive services at the Coumadin Clinic? No   Discharge Plan A Home with family   DME Needed Upon Discharge  none   Transferred from [facility] for SVT. Now post ICD placement. Lives with sister and is independent in his ADLs. Plan is to DC home. No DC needs anticipated.

## 2019-08-06 NOTE — PLAN OF CARE
arranged transportation home with Medicaid transportation, reference #[id number].       08/06/19 0965   Post-Acute Status   Discharge Delays (!) Patient Transportation

## 2019-08-06 NOTE — PLAN OF CARE
Problem: Adult Inpatient Plan of Care  Goal: Plan of Care Review  Outcome: Ongoing (interventions implemented as appropriate)  Patient AAOx4. Complaints of pain pain at surgical site. VS stable, afrebrile. Neurovascular intact. Skin intact, with exception of left upper chest incision. Free from falls. Frequent rounds made for safety, pain and comfort. Bed at lowest position, call light within reach, side rails up x2.      Goal: Absence of Hospital-Acquired Illness or Injury  Outcome: Ongoing (interventions implemented as appropriate)  Intervention: Prevent Skin Injury  Patient can reposition self.   Intervention: Prevent Infection  Hygiene promoted.

## 2019-08-06 NOTE — NURSING
Pt d/c home per MD order. Telemetry removed. Telemetry room notified. IV access removed and intact x3. VSS. No distress noted. No complaints noted at this time. Pt given and explained medication list and prescriptions. Pt verbalizes complete understanding of all discharge instructions and follow up care. Pt given printed AVS. AVS confirmation completed. Rn escorted pt to transport via wheelchair. Will continue to monitor.

## 2019-08-07 NOTE — ASSESSMENT & PLAN NOTE
-chronic, baseline 1.5-1.6  -[facility] noted to be 1.8 with trend 1.8-2.1  -avoid nephrotoxins and hypotension as able, renally dose medications  -monitor

## 2019-08-07 NOTE — ASSESSMENT & PLAN NOTE
- transferred 8/2 due to NSTEMI with [facility] for EP evaluation  - on arrival he had CP that was relieved with SL NTG, he attributed chest pain to increased anxiety and PRN regimen initiated  - evaluated by EP s/p ICD placement  - continue amiodarone and carvedilol  - No events on tele monitoring  - EKG and SL NTG PRN chest pain/arrhythmia   - cardiac diet in house  - outpt follow up with EP at [facility]
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
## 2019-08-07 NOTE — ASSESSMENT & PLAN NOTE
- nonspecific, non tender >>> now resolved  - amylase and lipase WNL  - abd US without acute pathology however noted nonspecific diffuse gallbladder wall thickening  - tolerating PO intake

## 2019-08-07 NOTE — ASSESSMENT & PLAN NOTE
-significant CAD history as noted in HPI  - recently admitted at OSH due to NSTEMI with troponin peak of 1.899 on 7/26/2019 at which time he had LHC that noted diffuse disease LAD, diffuse disease on LCX and  dRCA with plans for medical management  - presented again to [facility] on 7/30/19 due to recurrent anginal chest pain >>>  troponin noted to be similar to prior recent checks and medical management continued  - NSVT episode on 8/1 as noted above  - on arrival to [facility] he had CP that was relieved with SL NTG, he attributed chest pain to increased anxiety and PRN regimen initiated  -evaluated by EP s/p ICD placement  -continue amiodarone, ASA, statin, carvedilol, PO furosemide, lisinopril, and aldactone  -home ISMN up titrated due to anginal pain  -continue tele monitoring  -EKG and SL NTG PRN chest pain/arrhythmia   -cardiac diet in house  -Cardiac Rehab referral   -outpt follow up with Cardiology at [place]

## 2019-08-07 NOTE — PROGRESS NOTES
C3 nurse attempted to contact patient. The following occurred:   C3 nurse attempted to contact Abdoul Ochoa for a TCC post hospital discharge follow up call. The patient is unable to conduct the call @ this time. The patient requested a callback.    The patient does not have a scheduled HOSFU appointment within 7-14 days post hospital discharge date 08/06/19. No PCP listed.

## 2019-08-07 NOTE — TELEPHONE ENCOUNTER
Received notification from [facility] unable to fill Cephalexin 500 mg Tablets.  Spoke to Romaine Shoemaker D, ([phone]) with [facility] who reports tablets are not covered under pt's plan.  She reports they have the 500 mg capsules that are equivalent and are covered under pt's plan.  OK given to switch to Cephalexin 500 mg to capsules with instructions for ONE capsule (500 mg total) by mouth three times daily for 5 days.  Dispense 15 tablets with no refills.  [facility]ara verbalizes understanding and appreciates call.

## 2019-08-08 NOTE — PROGRESS NOTES
The number listed for patient is sister's #. States that she is currently at work but that she will be home later and can call back around 3. States that she did not know patient's #.

## 2019-08-19 NOTE — TELEPHONE ENCOUNTER
Called patient in relation to missed scheduled wound appointment today. Patient stated he did not know about appt and has to call bus to come pick him up which needs advanced notice to notify the bus company. Advised patient since procedure was 2 weeks ago today, that he could send a picture for us to assess healing. Patient stated he wasn't home at the moment but would send a picture as soon as he got home. Patient stated he's still taking antibiotics and denies fever or drainage from site.

## 2019-08-19 NOTE — TELEPHONE ENCOUNTER
Patient's sister stated they went to the hospital in [place] unaware that the appointment was here. Spoke with the nurse about sending a photo later today.    ----- Message from Selina Nunez sent at 8/19/2019 10:18 AM CDT -----  Contact: Patient  The pt is calling to reschedule because his ride didn't come. Please call him back @ [phone]. Thanks, Selina

## 2019-10-27 PROBLEM — I21.4 NSTEMI (NON-ST ELEVATED MYOCARDIAL INFARCTION): Status: ACTIVE | Noted: 2019-01-01

## 2019-10-27 NOTE — ED TRIAGE NOTES
Pt presents to ED with CC of midsternal non-radiating 9/10 chest pain x 3 days. Pt had 325 mg aspirin and 2 sublingual nitros on route to ED. Pt had pacemaker placed in June. Pt reports feeling nauseous, vomited 2 times yesterday. Pt reports feeling dizzy and weak. Pt has productive cough x 3 days with clear sputum.     Pt denies diarrhea and pain with urination.     Pt is AAOx4, able to verbalize and follow commands.

## 2019-10-27 NOTE — ED PROVIDER NOTES
Encounter Date: 10/27/2019    SCRIBE #1 NOTE: I, Tomasa Roblero, am scribing for, and in the presence of,  Carrie Avila MD. I have scribed the following portions of the note - Other sections scribed: HPI, ROS, PE.       History     Chief Complaint   Patient presents with    Chest Pain     midsternal chest pain times 3 days. EMS gave x2 nitro, 324 ASA     This 54 y.o male presents to the ED for an evaluation for constant, non-radiating chest pain described as a heaviness x 3 days.  Patient currently rates the chest pain as 9/10.  He also reports of shortness of breath that is worse with lying flat. + PND  He also reports of episodes of nausea and diaphoresis associated with CP just PTA.  He states he attempted to treatment with his previously prescribed nitroglycerin, but reports of no relief of his symptoms. He received ASA and additional nitroglycerins by EMS without improvement. He reports he typically sleeps on 2 pillows at night and reports for the past few nights, he has been needing to sit upright on the side of the bed to catch his breath. Increasing OLIVO and also reports dry cough. Denies fever. Denies firing of AICD. Patient history is concerning for CAD s/p 4 stents, CABG, CHF (Ef 30%), NSVT s/p AICD. Patient denies recent weight gain. He states he is not currently taking Plavix (as he was taken off at last hospital visit).    The history is provided by the patient.     Review of patient's allergies indicates:  No Known Allergies  Past Medical History:   Diagnosis Date    Alcoholic intoxication without complication     Anemia in stage 3 chronic kidney disease 1/18/2018    CKD stage 3 7/31/2019    Coronary artery disease     Coronary artery disease of native artery of native heart with stable angina pectoris 1/18/2018    Essential hypertension 1/17/2018    History of CVA (cerebrovascular accident) 1/18/2018    Ischemic cardiomyopathy 7/27/2019    MI (myocardial infarction)     2013, 2015, 2016, 2018  "(total of 4 stents), triple CABG 2018    Mixed hyperlipidemia 2018    Nonsustained ventricular tachycardia 2019    Pneumonia of both lungs due to infectious organism      Past Surgical History:   Procedure Laterality Date    BYPASS GRAFT      CARDIAC SURGERY  2018    three vessel CABG    CORONARY ANGIOGRAPHY INCLUDING BYPASS GRAFTS WITH CATHETERIZATION OF LEFT HEART N/A 2019    Procedure: ANGIOGRAM, CORONARY, INCLUDING BYPASS GRAFT, WITH LEFT HEART CATHETERIZATION;  Surgeon: Chadwick Ramirez MD;  Location: [facility] CATH LAB;  Service: Cardiology;  Laterality: N/A;  rt groin, 5 fr, visipaque    CORONARY ANGIOPLASTY WITH STENT PLACEMENT      4 stents    TRACHEOSTOMY CLOSURE       Family History   Problem Relation Age of Onset    Heart disease Mother     Hypertension Mother     Heart disease Father     Hypertension Father      Social History     Tobacco Use    Smoking status: Former Smoker     Types: Cigarettes     Last attempt to quit: 2014     Years since quittin.4    Smokeless tobacco: Never Used   Substance Use Topics    Alcohol use: Not Currently     Frequency: Never     Comment: beer on the weekends "barely"    Drug use: No     Review of Systems   Constitutional: Positive for diaphoresis. Negative for chills and fever.   HENT: Negative for congestion, ear pain, rhinorrhea and sore throat.    Eyes: Negative for photophobia, pain and visual disturbance.   Respiratory: Positive for chest tightness and shortness of breath. Negative for cough.    Cardiovascular: Positive for chest pain. Negative for leg swelling.   Gastrointestinal: Positive for nausea. Negative for abdominal pain, blood in stool, constipation, diarrhea and vomiting.   Genitourinary: Negative for dysuria, frequency, hematuria and urgency.   Musculoskeletal: Negative for back pain, neck pain and neck stiffness.   Skin: Negative for rash and wound.   Neurological: Positive for light-headedness. Negative " for dizziness, weakness, numbness and headaches.   Hematological: Does not bruise/bleed easily.   Psychiatric/Behavioral: Negative for confusion and suicidal ideas.   All other systems reviewed and are negative.      Physical Exam     Initial Vitals [10/27/19 1706]   BP Pulse Resp Temp SpO2   (!) 161/108 76 18 97.4 °F (36.3 °C) 99 %      MAP       --         Physical Exam    Nursing note and vitals reviewed.  Constitutional: He appears well-developed and well-nourished. He is not diaphoretic. He appears distressed.   Patient appears uncomfortable, dry cough on exam   HENT:   Head: Normocephalic and atraumatic.   Right Ear: External ear normal.   Left Ear: External ear normal.   Mouth/Throat: Oropharynx is clear and moist. No oropharyngeal exudate.   Eyes: Conjunctivae and EOM are normal. Pupils are equal, round, and reactive to light. Right eye exhibits no discharge. Left eye exhibits no discharge.   Neck: Normal range of motion. Neck supple. JVD present.   Cardiovascular: Normal rate, regular rhythm, normal heart sounds and intact distal pulses. Exam reveals no gallop and no friction rub.    No murmur heard.  Pulmonary/Chest: No respiratory distress. He has no wheezes. He has no rhonchi. He has rales. He exhibits tenderness (to suprasternal chest ).   Patient has rales to his bilateral bases. AICD to left chest wall c/d/i. Midline scar.    Abdominal: Soft. Bowel sounds are normal. He exhibits no distension. There is no tenderness. There is no rebound and no guarding.   Musculoskeletal: Normal range of motion. He exhibits edema. He exhibits no tenderness.   Patient has 2+ pitting edema to his bilateral lower extremities.     Lymphadenopathy:     He has no cervical adenopathy.   Neurological: He is alert and oriented to person, place, and time. He has normal strength. No cranial nerve deficit. GCS score is 15. GCS eye subscore is 4. GCS verbal subscore is 5. GCS motor subscore is 6.   Skin: Skin is warm and dry.  Capillary refill takes less than 2 seconds.   Psychiatric: He has a normal mood and affect. Thought content normal.         ED Course   Procedures  Labs Reviewed   CBC W/ AUTO DIFFERENTIAL - Abnormal; Notable for the following components:       Result Value    RBC 4.56 (*)     Hemoglobin 11.2 (*)     Hematocrit 36.5 (*)     Mean Corpuscular Volume 80 (*)     Mean Corpuscular Hemoglobin 24.6 (*)     Mean Corpuscular Hemoglobin Conc 30.7 (*)     RDW 19.4 (*)     Mono% 18.8 (*)     All other components within normal limits   COMPREHENSIVE METABOLIC PANEL - Abnormal; Notable for the following components:    Creatinine 1.7 (*)     Total Bilirubin 1.8 (*)     eGFR if  51 (*)     eGFR if non  44 (*)     All other components within normal limits   TROPONIN I - Abnormal; Notable for the following components:    Troponin I 1.167 (*)     All other components within normal limits   TROPONIN I - Abnormal; Notable for the following components:    Troponin I 1.221 (*)     All other components within normal limits   B-TYPE NATRIURETIC PEPTIDE - Abnormal; Notable for the following components:    BNP 3,317 (*)     All other components within normal limits   APTT    Narrative:     (if patient is on warfarin prior to heparin therapy)   PROTIME-INR    Narrative:     (if patient is on warfarin prior to heparin therapy)   POCT INFLUENZA A/B MOLECULAR     EKG Readings: (Independently Interpreted)   Normal sinus rhythm at 78 with PVCs.  Patient with T-wave inversions in the lateral leads similar to previous.  T-wave flattening in inferior leads.  No significant ST elevation or depression.       Imaging Results          X-Ray Chest PA And Lateral (Final result)  Result time 10/27/19 18:19:49    Final result by Harsh Marroquin MD (10/27/19 18:19:49)                 Impression:      Airspace opacity in the right lower lobe, concerning for pneumonia.    Cardiomegaly without evidence of CHF.      Electronically  signed by: Harsh Marroquin MD  Date:    10/27/2019  Time:    18:19             Narrative:    EXAMINATION:  XR CHEST PA AND LATERAL    CLINICAL HISTORY:  Chest Pain;    TECHNIQUE:  PA and lateral views of the chest were performed.    COMPARISON:  08/05/2019.    FINDINGS:  There are postoperative changes in the cervical spine.  There is a single left-sided pacemaker lead.  There are changes of median sternotomy.    The trachea is unremarkable.  There is stable enlargement of the cardiac silhouette.  The hemidiaphragms are within normal limits.  There is no evidence of free air beneath the hemidiaphragms.  There are no pleural effusions.  There is no evidence of a pneumothorax.  There is no evidence of pneumomediastinum.  There is an airspace opacity in the right lower lobe.  There are degenerative changes in the osseous structures.                            MDM  54 year old patient with hx of CABG, CHF, CAD s/p stents, NSVT s/p AICD presents with chest pain not relieved with at home nitroglycerin.  Patient is at higher risk of ACS due to risk factors and HPI with a heart score of . Patient has received an aspirin by EMS PTA. Troponins, Cxr, ekg, and labs ordered which showed elevated trop to 1.2, elevated BNP to 3,300, crea to 1.7 (baseline), CXR concerning for possible PNA to the right lower lobe and patient does report recent dry cough and congestion. Patient received additional nitro without improvement of symptoms and given NSTEMI decision was made to place on nitro gtt to address ongoing symptoms.     Also considered but less likely:     PE: normal rate, no sob/recent immovilization/surgery/travel/family history  Pneumonia: chest xray negative. No fever or leukoscytosis. No cough and lungs non consistent with pna  Tamponade: unlikely due to chest xray and ekg  STEMI: No STEMI on ekg  Dissection: equal pulses bilaterally and no ripping chest pain to the back  Esophageal rupture: no dysphagia or vomiting and chest  xray negative for mediastinal air  Arrhythmia: no arrhythmia on ekg  CHF: Exam consistent with fluid overload, patient states not currently on lasix.   Pneumothorax: bilateral breath sounds and no signs of pneumothorax on chest xray    Discussed patient with Dr Lemus who recommends treating NSTEMI with heparin, nitro gtt and patient was admitted to Dr Briones in the ICU on a nitrogtt. Will also treat possible PNA with ceftriaxone and azithromycin. Patient understands and agrees with plan.  All questions answered.                   Scribe Attestation:   Scribe #1: I performed the above scribed service and the documentation accurately describes the services I performed. I attest to the accuracy of the note.               Clinical Impression:       ICD-10-CM ICD-9-CM   1. NSTEMI (non-ST elevated myocardial infarction) I21.4 410.70   2. Chest pain R07.9 786.50   3. Pneumonia of right lower lobe due to infectious organism J18.1 486   4. Acute on chronic congestive heart failure, unspecified heart failure type I50.9 428.0            I, Carrie Avila, personally performed the services described in this documentation. All medical record entries made by the scribe were at my direction and in my presence. I have reviewed the chart and agree that the record reflects my personal performance and is accurate and complete.                    Carrie Avila MD  10/27/19 [id number]

## 2019-10-28 PROBLEM — R55 SYNCOPE: Status: ACTIVE | Noted: 2019-01-01

## 2019-10-28 PROBLEM — Z95.810 ICD (IMPLANTABLE CARDIOVERTER-DEFIBRILLATOR), SINGLE, IN SITU: Status: ACTIVE | Noted: 2019-01-01

## 2019-10-28 PROBLEM — F14.91 HISTORY OF COCAINE USE: Status: ACTIVE | Noted: 2019-01-01

## 2019-10-28 NOTE — HPI
54 y.o. male that (in part)  has a past medical history of Alcoholic intoxication without complication, Anemia in stage 3 chronic kidney disease, CKD stage 3, Coronary artery disease, Coronary artery disease of native artery of native heart with stable angina pectoris, Essential hypertension, History of CVA (cerebrovascular accident), Ischemic cardiomyopathy, MI (myocardial infarction), Mixed hyperlipidemia, Nonsustained ventricular tachycardia, and Pneumonia of both lungs due to infectious organism.  has a past surgical history that includes Bypass Graft; Tracheostomy closure (2014); Cardiac surgery (01/2018); Coronary angioplasty with stent; and Coronary angiography including bypass grafts with catheterization of left heart (N/A, 7/26/2019). Presents to [facility] Department complaining of chest pain. Two days duration.  Initially patient took 1 nitroglycerin at home, 1 with EMS, and 2 additional nitroglycerins while in the ED.  Minimal relief from that.  Associated with nausea and diaphoresis.  Complained of cough and congestion with subjective fever.  Denies syncope, cyanosis, or palpitations.  Positive for peripheral edema, paroxysmal nocturnal dyspnea, and dyspnea with exertion.  In the emergency department routine laboratory studies, chest x-ray, EKG, cardiac enzymes were obtained.  There is evidence of markedly elevated BNP 3300, JVD, rales, and peripheral edema. Findings consistent with non ST elevation MI as well as acute CHF exacerbation.  He had a 2D echo performed in July with an EF of 30%.  T-wave inversions in his lateral leads but no evidence of acute ischemia on EKG.  Chest x-ray concerning for possibility of pneumonia.  Given empiric antibiotics pending further culture data.  Will repeat chest x-ray with PA and lateral.    The patient is well known to our service with a history of ischemic cardiomyopathy and nonsustained ventricular tachycardia.  I last saw him  back in August.  During that admission, he would been transferred to [facility] and had a [employer] ICD placed.  He now presents to the emergency room with cough and respiratory phasic chest pain.  His EKG, while abnormal, actually appears improved versus prior tracings.  His troponin is elevated at 1.2 which is similar to prior troponin elevations.  He did have a catheterization back in July of this year which did not reveal a culprit lesion.  Note is also made of significant hypertension both on arrival and at present.  The patient also apparently has a right lower lobe infiltrate concerning for possible pneumonia.  This is all in the background of CKD 3 with a creatinine of 1.7 which is near his baseline.  The patient does not report any medication non adherence.  He tells me he quit smoking after his defibrillator was placed.  He also describes a syncopal episode 3 days ago, but does not report being shocked by his defibrillator.  I have asked [employer] to interrogate the device.

## 2019-10-28 NOTE — H&P
[facility] Medicine  History & Physical    Patient Name: Abdoul Ochoa  MRN: [id number]  Admission Date: 10/28/2019  Attending Physician: Aftab Briones MD, MPH      PCP:     Primary Doctor No    CC:     Chief Complaint   Patient presents with    Chest Pain     midsternal chest pain times 3 days. EMS gave x2 nitro, 324 ASA       HISTORY OF PRESENT ILLNESS:     Abdoul Ochoa is a 54 y.o. male that (in part)  has a past medical history of Alcoholic intoxication without complication, Anemia in stage 3 chronic kidney disease, CKD stage 3, Coronary artery disease, Coronary artery disease of native artery of native heart with stable angina pectoris, Essential hypertension, History of CVA (cerebrovascular accident), Ischemic cardiomyopathy, MI (myocardial infarction), Mixed hyperlipidemia, Nonsustained ventricular tachycardia, and Pneumonia of both lungs due to infectious organism.  has a past surgical history that includes Bypass Graft; Tracheostomy closure (2014); Cardiac surgery (01/2018); Coronary angioplasty with stent; and Coronary angiography including bypass grafts with catheterization of left heart (N/A, 7/26/2019). Presents to [facility] Emergency Department complaining of chest pain. Two days duration.  Initially patient took 1 nitroglycerin at home, 1 with EMS, and 2 additional nitroglycerins while in the ED.  Minimal relief from that.  Associated with nausea and diaphoresis.  Complained of cough and congestion with subjective fever.  Denies syncope, cyanosis, or palpitations.  Positive for peripheral edema, paroxysmal nocturnal dyspnea, and dyspnea with exertion.    In the emergency department routine laboratory studies, chest x-ray, EKG, cardiac enzymes were obtained.  There is evidence of markedly elevated BNP 3300, JVD, rales, and peripheral edema. Findings consistent with non ST elevation MI as well as acute CHF exacerbation.  He had a 2D echo  performed in July with an EF of 30%.  Yes T-wave inversions in his lateral leads but no evidence of acute ischemia on EKG.  Chest x-ray concerning for possibility of pneumonia.  Given empiric antibiotics pending further culture data.  Will repeat chest x-ray with PA and lateral.    Hospital medicine has been asked to admit for further evaluation and treatment.       REVIEW OF SYSTEMS:     -- Constitutional:  Generalized malaise and subjective fever.  -- Eyes: No visual changes, diplopia, pain, tearing, blind spots, or discharge.   -- Ears, nose, mouth, throat, and face:  Cough, congestion, sore throat.  Denies epistaxis, d/c, bleeding gums, neck stiffness masses, or dental issues.  -- Respiratory:  As above in HPI.  -- Cardiovascular:  As above in the HPI.   -- Gastrointestinal:  Positive for nausea.  No vomiting, abdominal pain, hematemesis, melena, dyspepsia, or change in bowel habits.  -- Genitourinary: No hematuria, dysuria, frequency, urgency, nocturia, polyuria, stones, or incontinence.  -- Integument/breast: No rash, pruritis, pigmentation changes, dryness, or changes in hair  -- Hematologic/lymphatic: No easy bruising or lymphadenopathy.   -- Musculoskeletal: No acute arthralgias, acute myalgias, joint swelling, acute limitations of ROM, or acute muscular weakness.  -- Neurological: No seizures, headaches, incoordination, paraesthesias, ataxia, vertigo, or tremors.  -- Behavioral/Psych: No auditory or visual hallucinations, depression, or suicidal/homicidal ideations.  -- Endocrine: No heat or cold intolerance, polydipsia, or unintentional weight gain / loss.  -- Allergy/Immunologic: No recurrent infections or adverse reaction to food, insects, or difficulty breathing.        PAST MEDICAL / SURGICAL HISTORY:     Past Medical History:   Diagnosis Date    Alcoholic intoxication without complication     Anemia in stage 3 chronic kidney disease 1/18/2018    CKD stage 3 7/31/2019    Coronary artery disease      Coronary artery disease of native artery of native heart with stable angina pectoris 2018    Essential hypertension 2018    History of CVA (cerebrovascular accident) 2018    Ischemic cardiomyopathy 2019    MI (myocardial infarction)     , , ,  (total of 4 stents), triple CABG 2018    Mixed hyperlipidemia 2018    Nonsustained ventricular tachycardia 2019    Pneumonia of both lungs due to infectious organism      Past Surgical History:   Procedure Laterality Date    BYPASS GRAFT      CARDIAC SURGERY  2018    three vessel CABG    CORONARY ANGIOGRAPHY INCLUDING BYPASS GRAFTS WITH CATHETERIZATION OF LEFT HEART N/A 2019    Procedure: ANGIOGRAM, CORONARY, INCLUDING BYPASS GRAFT, WITH LEFT HEART CATHETERIZATION;  Surgeon: Chadwick Ramirez MD;  Location: [facility] CATH LAB;  Service: Cardiology;  Laterality: N/A;  rt groin, 5 fr, visipaque    CORONARY ANGIOPLASTY WITH STENT PLACEMENT      4 stents    TRACHEOSTOMY CLOSURE           FAMILY HISTORY:     Family History   Problem Relation Age of Onset    Heart disease Mother     Hypertension Mother     Heart disease Father     Hypertension Father          SOCIAL HISTORY:     Social History     Socioeconomic History    Marital status: Single     Spouse name: Not on file    Number of children: Not on file    Years of education: Not on file    Highest education level: Not on file   Occupational History    Not on file   Social Needs    Financial resource strain: Not on file    Food insecurity:     Worry: Not on file     Inability: Not on file    Transportation needs:     Medical: Not on file     Non-medical: Not on file   Tobacco Use    Smoking status: Former Smoker     Types: Cigarettes     Last attempt to quit: 2014     Years since quittin.4    Smokeless tobacco: Never Used   Substance and Sexual Activity    Alcohol use: Not Currently     Frequency: Never     Comment: beer on the  "weekends "barely"    Drug use: No    Sexual activity: Yes     Partners: Female   Lifestyle    Physical activity:     Days per week: Not on file     Minutes per session: Not on file    Stress: Not on file   Relationships    Social connections:     Talks on phone: Not on file     Gets together: Not on file     Attends Anabaptist service: Not on file     Active member of club or organization: Not on file     Attends meetings of clubs or organizations: Not on file     Relationship status: Not on file   Other Topics Concern    Not on file   Social History Narrative    Not on file         ALLERGIES:       Review of patient's allergies indicates:  No Known Allergies          HOME MEDICATIONS:     Prior to Admission medications    Medication Sig Start Date End Date Taking? Authorizing Provider   acetaminophen (TYLENOL) 500 MG tablet Take 1 tablet (500 mg total) by mouth every 6 (six) hours as needed. 8/6/19  Yes Melanie Encarnacion NP   amiodarone (PACERONE) 400 MG tablet Take 1 tablet (400 mg total) by mouth 2 (two) times daily. For two weeks then decrease to once daily on 8/19 8/6/19 8/5/20 Yes Melanie Encarnacion NP   aspirin 81 MG Chew Take 81 mg by mouth once daily.   Yes Historical Provider, MD   atorvastatin (LIPITOR) 80 MG tablet Take 1 tablet (80 mg total) by mouth once daily. 7/27/19  Yes Segundo Ovalle MD   furosemide (LASIX) 40 MG tablet Take 1 tablet (40 mg total) by mouth 2 (two) times daily. 8/6/19 8/5/20 Yes Melanie Encarnacion NP   isosorbide mononitrate (IMDUR) 120 MG 24 hr tablet Take 1 tablet (120 mg total) by mouth once daily. 8/6/19 8/5/20 Yes Melanie Encarnacion NP   nitroGLYCERIN (NITROSTAT) 0.4 MG SL tablet Place 1 tablet (0.4 mg total) under the tongue every 5 (five) minutes as needed for Chest pain. 7/27/19  Yes Segundo Ovalle MD   spironolactone (ALDACTONE) 25 MG tablet Take 1 tablet (25 mg total) by mouth once daily. 8/6/19 8/5/20 Yes Melanie Encarnacion NP   carvedilol (COREG) 25 " MG tablet Take 1 tablet (25 mg total) by mouth 2 (two) times daily. 7/27/19 7/26/20  Segundo Ovalle MD   clopidogrel (PLAVIX) 75 mg tablet Take 1 tablet (75 mg total) by mouth once daily. 7/27/19 7/26/20  Segundo Ovalle MD   lisinopril (PRINIVIL,ZESTRIL) 40 MG tablet Take 1 tablet (40 mg total) by mouth once daily. 7/27/19   Segundo Ovalle MD          HOSPITAL MEDICATIONS:     Scheduled Meds:    amiodarone  400 mg Oral Daily    aspirin  81 mg Oral Daily    atorvastatin  80 mg Oral Daily    benzonatate  200 mg Oral TID    cefTRIAXone (ROCEPHIN) IVPB  1 g Intravenous Q24H    clopidogrel  75 mg Oral Daily    dextromethorphan-guaifenesin  mg/5 ml  10 mL Oral Q6H    furosemide  40 mg Intravenous BID    heparin (PORCINE)  57.4 Units/kg (Adjusted) Intravenous Once    isosorbide mononitrate  120 mg Oral Daily    lisinopril  40 mg Oral Daily    spironolactone  25 mg Oral Daily     Continuous Infusions:    heparin (porcine) in D5W 14 Units/kg/hr (10/28/19 0350)    nitroGLYCERIN 20 mcg/min (10/28/19 0400)     PRN Meds: acetaminophen, heparin (PORCINE), heparin (PORCINE), morphine, nitroGLYCERIN, ondansetron, sodium chloride 0.9%      PHYSICAL EXAM:     Wt Readings from Last 1 Encounters:   10/28/19 0030 74.9 kg (165 lb 2 oz)   10/27/19 2300 74.9 kg (165 lb 2 oz)   10/27/19 1706 78.5 kg (173 lb)     Body mass index is 26.65 kg/m².  Vitals:    10/28/19 0405 10/28/19 0425 10/28/19 0430 10/28/19 0440   BP: (!) 175/120 (!) 162/133 (!) 175/120 (!) 168/117   BP Location: Right arm      Patient Position: Lying      Pulse: 71 71 74 69   Resp: 20 17 18 14   Temp: 98 °F (36.7 °C)      TempSrc: Oral      SpO2: 99% 98% 99% 100%   Weight:       Height:              -- General appearance: well developed. appears stated age   -- Head: normocephalic, atraumatic   -- Eyes: conjunctivae clear. Extraocular muscles intact  -- Nose: Nares normal. Septum midline.   -- Mouth/Throat: lips, mucosa, and tongue normal. no throat  erythema.   -- Neck: + JVD. supple, symmetrical, trachea midline, thyroid not grossly enlarged, appears symmetric  -- Lungs:  Bibasilar crackles to auscultation bilaterally. normal respiratory effort. No use of accessory muscles.   -- Chest wall: no tenderness. equal bilateral chest rise   -- Heart: regular rate and regular rhythm. S1, S2 normal.  no click, rub or gallop   -- Abdomen: soft, non-tender, non-distended, non-tympanic; bowel sounds normal; no masses  -- Extremities: no cyanosis, clubbing or edema.   -- Pulses: 2+ and symmetric   -- Skin:  Turgor normal. Color normal. Texture normal. No rashes or lesions.   -- Neurologic: Normal strength and tone. No focal numbness or weakness. CNII-XII intact. Rutledge coma scale: eyes open spontaneously-4, oriented & converses-5, obeys commands-6.      LABORATORY STUDIES:     Recent Results (from the past 36 hour(s))   CBC auto differential    Collection Time: 10/27/19  5:57 PM   Result Value Ref Range    WBC 4.52 3.90 - 12.70 K/uL    RBC 4.56 (L) 4.60 - 6.20 M/uL    Hemoglobin 11.2 (L) 14.0 - 18.0 g/dL    Hematocrit 36.5 (L) 40.0 - 54.0 %    Mean Corpuscular Volume 80 (L) 82 - 98 fL    Mean Corpuscular Hemoglobin 24.6 (L) 27.0 - 31.0 pg    Mean Corpuscular Hemoglobin Conc 30.7 (L) 32.0 - 36.0 g/dL    RDW 19.4 (H) 11.5 - 14.5 %    Platelets 270 150 - 350 K/uL    MPV 11.0 9.2 - 12.9 fL    Immature Granulocytes 0.4 0.0 - 0.5 %    Gran # (ANC) 1.8 1.8 - 7.7 K/uL    Immature Grans (Abs) 0.02 0.00 - 0.04 K/uL    Lymph # 1.7 1.0 - 4.8 K/uL    Mono # 0.9 0.3 - 1.0 K/uL    Eos # 0.1 0.0 - 0.5 K/uL    Baso # 0.07 0.00 - 0.20 K/uL    nRBC 0 0 /100 WBC    Gran% 39.7 38.0 - 73.0 %    Lymph% 38.1 18.0 - 48.0 %    Mono% 18.8 (H) 4.0 - 15.0 %    Eosinophil% 1.5 0.0 - 8.0 %    Basophil% 1.5 0.0 - 1.9 %    Differential Method Automated    Comprehensive metabolic panel    Collection Time: 10/27/19  5:57 PM   Result Value Ref Range    Sodium 139 136 - 145 mmol/L    Potassium 3.7 3.5 - 5.1  mmol/L    Chloride 103 95 - 110 mmol/L    CO2 25 23 - 29 mmol/L    Glucose 89 70 - 110 mg/dL    BUN, Bld 19 6 - 20 mg/dL    Creatinine 1.7 (H) 0.5 - 1.4 mg/dL    Calcium 9.4 8.7 - 10.5 mg/dL    Total Protein 6.7 6.0 - 8.4 g/dL    Albumin 3.7 3.5 - 5.2 g/dL    Total Bilirubin 1.8 (H) 0.1 - 1.0 mg/dL    Alkaline Phosphatase 93 55 - 135 U/L    AST 23 10 - 40 U/L    ALT 26 10 - 44 U/L    Anion Gap 11 8 - 16 mmol/L    eGFR if African American 51 (A) >60 mL/min/1.73 m^2    eGFR if non African American 44 (A) >60 mL/min/1.73 m^2   Troponin I #1    Collection Time: 10/27/19  5:57 PM   Result Value Ref Range    Troponin I 1.167 (H) 0.000 - 0.026 ng/mL   B-Type natriuretic peptide (BNP)    Collection Time: 10/27/19  5:57 PM   Result Value Ref Range    BNP 3,317 (H) 0 - 99 pg/mL   APTT    Collection Time: 10/27/19  5:57 PM   Result Value Ref Range    aPTT 25.9 21.0 - 32.0 sec   Protime-INR    Collection Time: 10/27/19  5:57 PM   Result Value Ref Range    Prothrombin Time 12.1 9.0 - 12.5 sec    INR 1.1 0.8 - 1.2   Troponin I #2    Collection Time: 10/27/19  9:41 PM   Result Value Ref Range    Troponin I 1.221 (H) 0.000 - 0.026 ng/mL   APTT    Collection Time: 10/28/19  2:34 AM   Result Value Ref Range    aPTT 35.9 (H) 21.0 - 32.0 sec   CBC auto differential    Collection Time: 10/28/19  2:34 AM   Result Value Ref Range    WBC 4.32 3.90 - 12.70 K/uL    RBC 4.22 (L) 4.60 - 6.20 M/uL    Hemoglobin 10.3 (L) 14.0 - 18.0 g/dL    Hematocrit 33.7 (L) 40.0 - 54.0 %    Mean Corpuscular Volume 80 (L) 82 - 98 fL    Mean Corpuscular Hemoglobin 24.4 (L) 27.0 - 31.0 pg    Mean Corpuscular Hemoglobin Conc 30.6 (L) 32.0 - 36.0 g/dL    RDW 19.3 (H) 11.5 - 14.5 %    Platelets 263 150 - 350 K/uL    MPV 11.2 9.2 - 12.9 fL    Immature Granulocytes 0.7 (H) 0.0 - 0.5 %    Gran # (ANC) 1.7 (L) 1.8 - 7.7 K/uL    Immature Grans (Abs) 0.03 0.00 - 0.04 K/uL    Lymph # 1.6 1.0 - 4.8 K/uL    Mono # 0.8 0.3 - 1.0 K/uL    Eos # 0.1 0.0 - 0.5 K/uL    Baso #  0.07 0.00 - 0.20 K/uL    nRBC 0 0 /100 WBC    Gran% 39.6 38.0 - 73.0 %    Lymph% 37.7 18.0 - 48.0 %    Mono% 18.5 (H) 4.0 - 15.0 %    Eosinophil% 1.9 0.0 - 8.0 %    Basophil% 1.6 0.0 - 1.9 %    Platelet Estimate Appears normal     Aniso Moderate     Poik Slight     Hypo Moderate     Differential Method Automated        Lab Results   Component Value Date    INR 1.1 10/27/2019    INR 1.1 08/05/2019    INR 1.1 01/17/2018     Lab Results   Component Value Date    HGBA1C 5.3 01/18/2018     No results for input(s): POCTGLUCOSE in the last 72 hours.          IMAGING:     Imaging Results          X-Ray Chest PA And Lateral (Final result)  Result time 10/27/19 18:19:49    Final result by Harsh Marroquin MD (10/27/19 18:19:49)                 Impression:      Airspace opacity in the right lower lobe, concerning for pneumonia.    Cardiomegaly without evidence of CHF.      Electronically signed by: Harsh Marroquin MD  Date:    10/27/2019  Time:    18:19             Narrative:    EXAMINATION:  XR CHEST PA AND LATERAL    CLINICAL HISTORY:  Chest Pain;    TECHNIQUE:  PA and lateral views of the chest were performed.    COMPARISON:  08/05/2019.    FINDINGS:  There are postoperative changes in the cervical spine.  There is a single left-sided pacemaker lead.  There are changes of median sternotomy.    The trachea is unremarkable.  There is stable enlargement of the cardiac silhouette.  The hemidiaphragms are within normal limits.  There is no evidence of free air beneath the hemidiaphragms.  There are no pleural effusions.  There is no evidence of a pneumothorax.  There is no evidence of pneumomediastinum.  There is an airspace opacity in the right lower lobe.  There are degenerative changes in the osseous structures.                                  CONSULTS:     IP CONSULT TO CARDIOLOGY       ASSESSMENT & PLAN:     Primary Diagnosis:  NSTEMI (non-ST elevated myocardial infarction)    Active Hospital Problems    Diagnosis  POA     *NSTEMI (non-ST elevated myocardial infarction) [I21.4]  Yes     Priority: 1 - High    History of cocaine use [Z87.898]  Yes    CKD stage 3 [N18.3]  Yes     Chronic    Ischemic cardiomyopathy [I25.5]  Yes     Chronic    History of CVA (cerebrovascular accident) [Z86.73]  Not Applicable     Chronic    Essential hypertension [I10]  Yes     Chronic    Mixed hyperlipidemia [E78.2]  Yes     Chronic    Tobacco abuse [Z72.0]  Yes     Chronic      Resolved Hospital Problems   No resolved problems to display.       Acute Chest Pain, rule-out myocardial infarction   · Intitial EKG findings shows no evidence of ST-elevation MI  · Initial and subsequent troponins are above normal limits but overall aflat trend.    Recent Labs     10/27/19  1757 10/27/19  2141   TROPONINI 1.167* 1.221*       · Intermediate to risk for MI; male gender, CKD, hypertension, hyperlipidemia, cocaine use  http://clincalc.com/cardiology/ascvd/pooledcohort.aspx  · Initiate ACS protocol to rule out MI, then explore noncardiac etiology  · Trend cardiac enzymes  · Aspirin  · Beta-blocker after urine drug screen to ensure patient is not currently taking cocaine.  History of cocaine abuse  · Statin  · Supplemental oxygen  · 2D echocardiogram  · TSH, FT3, FT4  · ESR and cardiac specific CRP   · PT, PTT, INR   · Tight glycemic control  · Monitor on telemetry unit  · Consult cardiologist  · Placed on nitroglycerin infusion and titrating to chest pain free.  · Morphine p.r.n.    Hyperlipidemia   · Lipid panel - as an outpatient  · Cardiac diet  · Continue statin    Essential Hypertension  · Goal while inpatient is a systolic blood pressure less than 160mmHg  · BP in acceptable range at this time  · Continue current home regimen with hold parameters  · PRN antihypertensives available    Chronic kidney disease  · Renal dose medications  · Avoid nephrotoxic agents  · Maintain euvolemic state    Polysubstance abuse   · Chronic tobacco use  · History of Chronic  illicit drug use; cocaine.  Urine drug screen pending.  Contraindication for beta-blocker if still using cocaine  · Case management consult to provide community resources for substance abuse  · Nicotine patch contraindicated at this time but should be offered prior to discharge ; considered Wellbutrin or Chantix through PCP as an outpatient (will require closer monitoring)  · Tobacco cessation counseling: I discussed with the patient regarding the hazardous effects of smoking on increasing risk of heart attack and stroke, worsening lung functions, and increasing cancer risk.   Patient was urged to stop smoking now.  I also offered nicotine taper (such as nicotine patch and gum) to help ease the craving to smoke.          VTE Risk Mitigation (From admission, onward)         Ordered     IP VTE HIGH RISK PATIENT  Once      10/28/19 0322     Place sequential compression device  Until discontinued      10/28/19 0322     heparin 25,000 units in dextrose 5% (100 units/ml) IV bolus from bag INITIAL BOLUS (max bolus 4000 units)  Once      10/27/19 1926     heparin 25,000 units in dextrose 5% 250 mL (100 units/mL) infusion LOW INTENSITY nomogram - [facility]  Continuous      10/27/19 1926     heparin 25,000 units in dextrose 5% (100 units/ml) IV bolus from bag - ADDITIONAL PRN BOLUS - 60 units/kg (max bolus 4000 units)  As needed (PRN)      10/27/19 1926     heparin 25,000 units in dextrose 5% (100 units/ml) IV bolus from bag - ADDITIONAL PRN BOLUS - 30 units/kg (max bolus 4000 units)  As needed (PRN)      10/27/19 1926                  Adult PRN medications available   DVT prophylaxis given       DISPOSITION:     Will admit to the Hospital Medicine service for further evaluation and treatment.    Chart reviewed and updated where applicable.    High Risk Conditions:  Patient has a condition that poses threat to life and bodily function:  Non ST elevation  MI      ===============================================================    Aftab Briones MD, MPH  Department of Hospital Medicine   [facility] [place]  [phone] pg  (7pm - 6am)          This note is dictated using Nano voice recognition software.  There are word recognition mistakes that are occasionally missed on review.

## 2019-10-28 NOTE — PROGRESS NOTES
Pt ambulated to bathroom for BM and he became tachypneic and short of breath afterwards. O2 sats 100% and . Shortly after, he stated he felt better and began eating dinner without issue.

## 2019-10-28 NOTE — PLAN OF CARE
"TN met with patient at bedside to complete discharge needs assessment. TN explained duties of case management to patient.  TN reviewed   "[facility] Packet", "Discharge Planning Begins on Admission"  and discussed "Help at Home". Patient stated he lives at home with his sister Xena who will assist him as needed. TN also discussed his responsibilities to manage his health at home. Patient was informed to leave folder at bedside during hospital stay. Contact information added to white board.    Patient Preferred Pharmacy:   [id number] - [place] [address] [address]  [address]  [place] [address]  Phone: [phone] Fax: [phone]      Appointment Time Preference: morning       10/28/19 1436   Discharge Assessment   Assessment Type Discharge Planning Assessment   Confirmed/corrected address and phone number on facesheet? Yes   Assessment information obtained from? Patient   Prior to hospitilization cognitive status: Alert/Oriented   Prior to hospitalization functional status: Independent   Current cognitive status: Alert/Oriented   Current Functional Status: Independent   Lives With sibling(s)   Able to Return to Prior Arrangements yes   Is patient able to care for self after discharge? Yes   Who are your caregiver(s) and their phone number(s)? Arlene sharpe @ [phone]   Patient's perception of discharge disposition home or selfcare   Readmission Within the Last 30 Days no previous admission in last 30 days   Patient currently being followed by outpatient case management? No   Patient currently receives any other outside agency services? No   Equipment Currently Used at Home none   Do you have any problems affording any of your prescribed medications? No   Is the patient taking medications as prescribed? yes   Does the patient have transportation home? Yes   Transportation Anticipated cab   Does the patient receive services at the [facility]? No   Discharge " Plan A Home;Home with family   Discharge Plan B Home;Home with family   DME Needed Upon Discharge  none   Patient/Family in Agreement with Plan yes

## 2019-10-28 NOTE — CARE UPDATE
Reviewed H and P by my colleague and agree with A and P. Patient presenting with CP and non-stemi. In ICU on a nitro drip. Will continue ICU care and follow cards recs.

## 2019-10-28 NOTE — SUBJECTIVE & OBJECTIVE
Past Medical History:   Diagnosis Date    Alcoholic intoxication without complication     Anemia in stage 3 chronic kidney disease 1/18/2018    CKD stage 3 7/31/2019    Coronary artery disease     Coronary artery disease of native artery of native heart with stable angina pectoris 1/18/2018    Essential hypertension 1/17/2018    History of CVA (cerebrovascular accident) 1/18/2018    Ischemic cardiomyopathy 7/27/2019    MI (myocardial infarction)     2013, 2015, 2016, 2018 (total of 4 stents), triple CABG January 2018    Mixed hyperlipidemia 1/17/2018    Nonsustained ventricular tachycardia 7/31/2019    Pneumonia of both lungs due to infectious organism        Past Surgical History:   Procedure Laterality Date    BYPASS GRAFT      CARDIAC SURGERY  01/2018    three vessel CABG    CORONARY ANGIOGRAPHY INCLUDING BYPASS GRAFTS WITH CATHETERIZATION OF LEFT HEART N/A 7/26/2019    Procedure: ANGIOGRAM, CORONARY, INCLUDING BYPASS GRAFT, WITH LEFT HEART CATHETERIZATION;  Surgeon: Chadwick Ramirez MD;  Location: [facility] CATH LAB;  Service: Cardiology;  Laterality: N/A;  rt groin, 5 fr, visipaque    CORONARY ANGIOPLASTY WITH STENT PLACEMENT      4 stents    TRACHEOSTOMY CLOSURE  2014       Review of patient's allergies indicates:  No Known Allergies    No current facility-administered medications on file prior to encounter.      Current Outpatient Medications on File Prior to Encounter   Medication Sig    acetaminophen (TYLENOL) 500 MG tablet Take 1 tablet (500 mg total) by mouth every 6 (six) hours as needed.    amiodarone (PACERONE) 400 MG tablet Take 1 tablet (400 mg total) by mouth 2 (two) times daily. For two weeks then decrease to once daily on 8/19    aspirin 81 MG Chew Take 81 mg by mouth once daily.    atorvastatin (LIPITOR) 80 MG tablet Take 1 tablet (80 mg total) by mouth once daily.    furosemide (LASIX) 40 MG tablet Take 1 tablet (40 mg total) by mouth 2 (two) times daily.    isosorbide  "mononitrate (IMDUR) 120 MG 24 hr tablet Take 1 tablet (120 mg total) by mouth once daily.    nitroGLYCERIN (NITROSTAT) 0.4 MG SL tablet Place 1 tablet (0.4 mg total) under the tongue every 5 (five) minutes as needed for Chest pain.    spironolactone (ALDACTONE) 25 MG tablet Take 1 tablet (25 mg total) by mouth once daily.    carvedilol (COREG) 25 MG tablet Take 1 tablet (25 mg total) by mouth 2 (two) times daily.    clopidogrel (PLAVIX) 75 mg tablet Take 1 tablet (75 mg total) by mouth once daily.    lisinopril (PRINIVIL,ZESTRIL) 40 MG tablet Take 1 tablet (40 mg total) by mouth once daily.     Family History     Problem Relation (Age of Onset)    Heart disease Mother, Father    Hypertension Mother, Father        Tobacco Use    Smoking status: Former Smoker     Types: Cigarettes     Last attempt to quit: 2014     Years since quittin.4    Smokeless tobacco: Never Used   Substance and Sexual Activity    Alcohol use: Not Currently     Frequency: Never     Comment: beer on the weekends "barely"    Drug use: No    Sexual activity: Yes     Partners: Female     Review of Systems   Constitution: Negative for chills, diaphoresis, fever and malaise/fatigue.   HENT: Negative for nosebleeds.    Eyes: Negative for blurred vision and double vision.   Cardiovascular: Positive for chest pain and syncope. Negative for claudication, cyanosis, dyspnea on exertion, leg swelling, orthopnea, palpitations and paroxysmal nocturnal dyspnea.   Respiratory: Positive for cough, shortness of breath and sputum production. Negative for wheezing.    Skin: Negative for dry skin and poor wound healing.   Musculoskeletal: Negative for back pain, joint swelling and myalgias.   Gastrointestinal: Negative for abdominal pain, nausea and vomiting.   Genitourinary: Negative for hematuria.   Neurological: Negative for dizziness, headaches, numbness, seizures and weakness.   Psychiatric/Behavioral: Negative for altered mental status and " depression.     Objective:     Vital Signs (Most Recent):  Temp: 98 °F (36.7 °C) (10/28/19 0800)  Pulse: 80 (10/28/19 0900)  Resp: 15 (10/28/19 0900)  BP: (!) 177/134 (10/28/19 0900)  SpO2: 100 % (10/28/19 0900) Vital Signs (24h Range):  Temp:  [97.4 °F (36.3 °C)-98 °F (36.7 °C)] 98 °F (36.7 °C)  Pulse:  [69-87] 80  Resp:  [10-44] 15  SpO2:  [96 %-100 %] 100 %  BP: (139-190)/() 177/134     Weight: 74.9 kg (165 lb 2 oz)  Body mass index is 26.65 kg/m².    SpO2: 100 %  O2 Device (Oxygen Therapy): room air      Intake/Output Summary (Last 24 hours) at 10/28/2019 0934  Last data filed at 10/28/2019 0830  Gross per 24 hour   Intake 410 ml   Output 3450 ml   Net -3040 ml       Lines/Drains/Airways     Peripheral Intravenous Line                 Peripheral IV - Single Lumen 10/27/19 1628 18 G Left Forearm less than 1 day         Peripheral IV - Single Lumen 10/27/19 1755 20 G Right Forearm less than 1 day         Peripheral IV - Single Lumen 10/27/19 2031 20 G Left Antecubital less than 1 day                Physical Exam   Constitutional: He is oriented to person, place, and time. He appears well-developed and well-nourished.   HENT:   Head: Normocephalic and atraumatic.   Eyes: Pupils are equal, round, and reactive to light. Conjunctivae and EOM are normal. No scleral icterus.   Neck: Normal range of motion. Neck supple. No JVD present. No tracheal deviation present. No thyromegaly present.   Cardiovascular: Normal rate, regular rhythm, S1 normal and S2 normal. Exam reveals no gallop and no friction rub.   No murmur heard.  L ICD subclavicular incision well healed   Pulmonary/Chest: Effort normal and breath sounds normal. No respiratory distress. He has no wheezes. He has no rales. He exhibits no tenderness.   Abdominal: Soft. He exhibits no distension.   Musculoskeletal: Normal range of motion. He exhibits no edema.   Neurological: He is alert and oriented to person, place, and time. He has normal strength. No  cranial nerve deficit.   Skin: Skin is warm and dry. No rash noted.   Psychiatric: He has a normal mood and affect. His behavior is normal.       Current Medications:   amiodarone  400 mg Oral Daily    aspirin  81 mg Oral Daily    atorvastatin  80 mg Oral Daily    benzonatate  200 mg Oral TID    cefTRIAXone (ROCEPHIN) IVPB  1 g Intravenous Q24H    clopidogrel  75 mg Oral Daily    dextromethorphan-guaifenesin  mg/5 ml  10 mL Oral Q6H    furosemide  40 mg Intravenous BID    heparin (PORCINE)  57.4 Units/kg (Adjusted) Intravenous Once    isosorbide mononitrate  120 mg Oral Daily    lisinopril  40 mg Oral Daily    spironolactone  25 mg Oral Daily      heparin (porcine) in D5W 14 Units/kg/hr (10/28/19 0350)    nitroGLYCERIN Stopped (10/28/19 0933)     acetaminophen, heparin (PORCINE), heparin (PORCINE), morphine, nitroGLYCERIN, ondansetron, sodium chloride 0.9%    Laboratory (all labs reviewed):  CBC:  Recent Labs   Lab 08/04/19  0330 08/05/19  0632 08/06/19  0319 10/27/19  1757 10/28/19  0234   WBC 5.23 5.10  5.10 6.53 4.52 4.32   Hemoglobin 11.4 L 11.3 L  11.3 L 11.3 L 11.2 L 10.3 L   Hematocrit 37.2 L 35.5 L  35.5 L 35.0 L 36.5 L 33.7 L   Platelets 307 299  299 313 270 263       CHEMISTRIES:  Recent Labs   Lab 07/31/19  0406 08/03/19  0820 08/04/19  0330 08/05/19  0632 08/06/19  0319 10/27/19  1757   Glucose 96 86 103 90  90 113 H 89   Sodium 138 134 L 139 138  138 135 L 139   Potassium 3.8 4.5 4.8 4.5  4.5 3.8 3.7   BUN, Bld 23 H 25 H 27 H 25 H  25 H 23 H 19   Creatinine 1.5 H 1.8 H 2.1 H 1.9 H  1.9 H 1.9 H 1.7 H   eGFR if  59 A 47.6 A 39.5 A 44.6 A  44.6 A 44.6 A 51 A   eGFR if non  51 A 41.2 A 34.2 A 38.6 A  38.6 A 38.6 A 44 A   Calcium 8.9 9.2 9.6 9.3  9.3 8.7 9.4   Magnesium 2.0 2.0 2.0 2.1 2.1  --        CARDIAC BIOMARKERS:  Recent Labs   Lab 01/18/18  0008  07/25/19  1319  07/31/19  1009 07/31/19  2243 08/02/19  2217 10/27/19  1757 10/27/19  2141  10/28/19  0230   CPK 84  --  235 H  235 H  --   --   --   --   --   --  71   CPK MB 2.1  --  3.2  --   --   --   --   --   --   --    Troponin I 1.291 H   < > 1.684 H   < > 1.823 H 1.743 H 1.580 H 1.167 H 1.221 H  --     < > = values in this interval not displayed.       COAGS:  Recent Labs   Lab 01/17/18  0636 08/05/19  0632 10/27/19  1757   INR 1.1 1.1 1.1       LIPIDS/LFTS:  Recent Labs   Lab 01/18/18  0254  07/25/19  1319 07/26/19  0203 07/30/19 2001 07/30/19 2232 07/31/19  0406 10/27/19  1757   Cholesterol 156  --   --   --   --  116 L  --   --    Triglycerides 81  --   --   --   --  71  --   --    HDL 38 L  --   --   --   --  11 L  --   --    LDL Cholesterol 101.8  --   --   --   --  90.8  --   --    Non-HDL Cholesterol 118  --   --   --   --  105  --   --    AST 29   < > 43 H 38 35  --  33 23   ALT 33   < > 34 36 43  --  44 26    < > = values in this interval not displayed.       BNP:  Recent Labs   Lab 01/17/18 0636 03/25/18  1752 07/30/19  2001 10/27/19  1757   BNP 1,849 H 1,039 H 3,044 H 3,317 H       TSH:        Free T4:        Diagnostic Results:  ECG (personally reviewed and interpreted tracing(s)):  10/27/19 1736 SR 78, PVCs, NSSTTW changes (lat TWI not as prov vs tracing 8/5/19)    Chest X-Ray (personally reviewed and interpreted image(s)): 10/27/19 ?RLL PNA, CMeg, L ICD (1 lead)    Echo: 7/26/19 (repeat ordered)  · Moderately decreased left ventricular systolic function. The estimated ejection fraction is 30%  · Moderate concentric left ventricular hypertrophy.  · Grade III (severe) left ventricular diastolic dysfunction consistent with restrictive physiology.  · Elevated left atrial pressure.  · Mild right ventricular enlargement.  · Moderate left atrial enlargement.  · Mild right atrial enlargement.  · Moderate-to-severe mitral regurgitation.  · Moderate tricuspid regurgitation.  · Mild pulmonic regurgitation.  · Intermediate central venous pressure (8 mm Hg).  · The estimated PA systolic  pressure is 35 mm Hg  · Pulmonary hypertension present.     Cath: 7/26/19  1.  Severe triple-vessel coronary artery disease  2.  Patent BARAJAS to LAD, patent SVG to diagonal 1 and patent SVG to circumflex.  No culprit stenosis noted in these grafts  3.  Mid to distal RCA .  Distal RCA fills with collaterals from the left.  Unchanged from prior.  Findings/Key Components:  LVEDP: 19 mmHg  Dominance: Right   LM:  Patent with no significant stenosis  LAD:  Diffusely disease proximal to mid.  Fills with LIMA to LAD and SVG to diagonal 1  LCx:  Native vessel diffusely disease.  Fills with SVG to OM2  RCA:  Proximal 70% stenosis.  Distal .  Fills with collaterals from the left.  Hemostasis:  RFA 5 Irish sheath.  Manual pressure held in the cath lab for hemostasis  Impression:  Severe triple-vessel coronary artery disease.  Patent LIMA to LAD, SVG to OM and SVG to diagonal.  No acute culprit lesion identified  Plan:  Aspirin 81 mg daily indefinitely  Plavix 75 mg daily for at least 1 year  Follow-up in Cardiology Clinic

## 2019-10-28 NOTE — CARE UPDATE
[facility]  ICU Multidisciplinary Bedside Rounds     UPDATE     Date: 10/28/2019      Plan of care reviewed with the following, Nurse, Charge Nurse, Physician, Pulm CC, Resp. Therapist, Infection Prevention, Dietician and .       Needs/ Goals for the day: seen by cards. Wean off nitro gtt      Level of Care: OK to Transfer post-cath

## 2019-10-28 NOTE — EICU
Called [facility] re extreme BP drop over the last few hours, answering RN stated that she will let bedside know.

## 2019-10-28 NOTE — CONSULTS
[facility]  Cardiology  Consult Note    Patient Name: Abdoul Ochoa  MRN: [id number]  Admission Date: 10/27/2019  Hospital Length of Stay: 1 days  Code Status: Full Code   Attending Provider: Ja Anthony MD   Consulting Provider: Aftab Lemus MD  Primary Care Physician: Primary Doctor No  Principal Problem:NSTEMI (non-ST elevated myocardial infarction)    Patient information was obtained from patient and ER records.     Inpatient consult to Cardiology  Consult performed by: Aftab Lemus MD  Consult ordered by: Aftab Briones MD  Reason for consult: CP/NSTEMI/ICM        Subjective:     Chief Complaint:  CP     HPI:   54 y.o. male that (in part)  has a past medical history of Alcoholic intoxication without complication, Anemia in stage 3 chronic kidney disease, CKD stage 3, Coronary artery disease, Coronary artery disease of native artery of native heart with stable angina pectoris, Essential hypertension, History of CVA (cerebrovascular accident), Ischemic cardiomyopathy, MI (myocardial infarction), Mixed hyperlipidemia, Nonsustained ventricular tachycardia, and Pneumonia of both lungs due to infectious organism.  has a past surgical history that includes Bypass Graft; Tracheostomy closure (2014); Cardiac surgery (01/2018); Coronary angioplasty with stent; and Coronary angiography including bypass grafts with catheterization of left heart (N/A, 7/26/2019). Presents to [facility] Emergency Department complaining of chest pain. Two days duration.  Initially patient took 1 nitroglycerin at home, 1 with EMS, and 2 additional nitroglycerins while in the ED.  Minimal relief from that.  Associated with nausea and diaphoresis.  Complained of cough and congestion with subjective fever.  Denies syncope, cyanosis, or palpitations.  Positive for peripheral edema, paroxysmal nocturnal dyspnea, and dyspnea with exertion.  In the emergency department routine  laboratory studies, chest x-ray, EKG, cardiac enzymes were obtained.  There is evidence of markedly elevated BNP 3300, JVD, rales, and peripheral edema. Findings consistent with non ST elevation MI as well as acute CHF exacerbation.  He had a 2D echo performed in July with an EF of 30%.  T-wave inversions in his lateral leads but no evidence of acute ischemia on EKG.  Chest x-ray concerning for possibility of pneumonia.  Given empiric antibiotics pending further culture data.  Will repeat chest x-ray with PA and lateral.    The patient is well known to our service with a history of ischemic cardiomyopathy and nonsustained ventricular tachycardia.  I last saw him back in August.  During that admission, he would been transferred to [facility] and had a Biotronik ICD placed.  He now presents to the emergency room with cough and respiratory phasic chest pain.  His EKG, while abnormal, actually appears improved versus prior tracings.  His troponin is elevated at 1.2 which is similar to prior troponin elevations.  He did have a catheterization back in July of this year which did not reveal a culprit lesion.  Note is also made of significant hypertension both on arrival and at present.  The patient also apparently has a right lower lobe infiltrate concerning for possible pneumonia.  This is all in the background of CKD 3 with a creatinine of 1.7 which is near his baseline.  The patient does not report any medication non adherence.  He tells me he quit smoking after his defibrillator was placed.  He also describes a syncopal episode 3 days ago, but does not report being shocked by his defibrillator.  I have asked [employer] to interrogate the device.    Past Medical History:   Diagnosis Date    Alcoholic intoxication without complication     Anemia in stage 3 chronic kidney disease 1/18/2018    CKD stage 3 7/31/2019    Coronary artery disease     Coronary artery disease of native artery of native heart with  stable angina pectoris 1/18/2018    Essential hypertension 1/17/2018    History of CVA (cerebrovascular accident) 1/18/2018    Ischemic cardiomyopathy 7/27/2019    MI (myocardial infarction)     2013, 2015, 2016, 2018 (total of 4 stents), triple CABG January 2018    Mixed hyperlipidemia 1/17/2018    Nonsustained ventricular tachycardia 7/31/2019    Pneumonia of both lungs due to infectious organism        Past Surgical History:   Procedure Laterality Date    BYPASS GRAFT      CARDIAC SURGERY  01/2018    three vessel CABG    CORONARY ANGIOGRAPHY INCLUDING BYPASS GRAFTS WITH CATHETERIZATION OF LEFT HEART N/A 7/26/2019    Procedure: ANGIOGRAM, CORONARY, INCLUDING BYPASS GRAFT, WITH LEFT HEART CATHETERIZATION;  Surgeon: Chadwick Ramirez MD;  Location: [facility] CATH LAB;  Service: Cardiology;  Laterality: N/A;  rt groin, 5 fr, visipaque    CORONARY ANGIOPLASTY WITH STENT PLACEMENT      4 stents    TRACHEOSTOMY CLOSURE  2014       Review of patient's allergies indicates:  No Known Allergies    No current facility-administered medications on file prior to encounter.      Current Outpatient Medications on File Prior to Encounter   Medication Sig    acetaminophen (TYLENOL) 500 MG tablet Take 1 tablet (500 mg total) by mouth every 6 (six) hours as needed.    amiodarone (PACERONE) 400 MG tablet Take 1 tablet (400 mg total) by mouth 2 (two) times daily. For two weeks then decrease to once daily on 8/19    aspirin 81 MG Chew Take 81 mg by mouth once daily.    atorvastatin (LIPITOR) 80 MG tablet Take 1 tablet (80 mg total) by mouth once daily.    furosemide (LASIX) 40 MG tablet Take 1 tablet (40 mg total) by mouth 2 (two) times daily.    isosorbide mononitrate (IMDUR) 120 MG 24 hr tablet Take 1 tablet (120 mg total) by mouth once daily.    nitroGLYCERIN (NITROSTAT) 0.4 MG SL tablet Place 1 tablet (0.4 mg total) under the tongue every 5 (five) minutes as needed for Chest pain.    spironolactone (ALDACTONE) 25 MG  "tablet Take 1 tablet (25 mg total) by mouth once daily.    carvedilol (COREG) 25 MG tablet Take 1 tablet (25 mg total) by mouth 2 (two) times daily.    clopidogrel (PLAVIX) 75 mg tablet Take 1 tablet (75 mg total) by mouth once daily.    lisinopril (PRINIVIL,ZESTRIL) 40 MG tablet Take 1 tablet (40 mg total) by mouth once daily.     Family History     Problem Relation (Age of Onset)    Heart disease Mother, Father    Hypertension Mother, Father        Tobacco Use    Smoking status: Former Smoker     Types: Cigarettes     Last attempt to quit: 2014     Years since quittin.4    Smokeless tobacco: Never Used   Substance and Sexual Activity    Alcohol use: Not Currently     Frequency: Never     Comment: beer on the weekends "barely"    Drug use: No    Sexual activity: Yes     Partners: Female     Review of Systems   Constitution: Negative for chills, diaphoresis, fever and malaise/fatigue.   HENT: Negative for nosebleeds.    Eyes: Negative for blurred vision and double vision.   Cardiovascular: Positive for chest pain and syncope. Negative for claudication, cyanosis, dyspnea on exertion, leg swelling, orthopnea, palpitations and paroxysmal nocturnal dyspnea.   Respiratory: Positive for cough, shortness of breath and sputum production. Negative for wheezing.    Skin: Negative for dry skin and poor wound healing.   Musculoskeletal: Negative for back pain, joint swelling and myalgias.   Gastrointestinal: Negative for abdominal pain, nausea and vomiting.   Genitourinary: Negative for hematuria.   Neurological: Negative for dizziness, headaches, numbness, seizures and weakness.   Psychiatric/Behavioral: Negative for altered mental status and depression.     Objective:     Vital Signs (Most Recent):  Temp: 98 °F (36.7 °C) (10/28/19 0800)  Pulse: 80 (10/28/19 0900)  Resp: 15 (10/28/19 0900)  BP: (!) 177/134 (10/28/19 0900)  SpO2: 100 % (10/28/19 0900) Vital Signs (24h Range):  Temp:  [97.4 °F (36.3 °C)-98 °F " (36.7 °C)] 98 °F (36.7 °C)  Pulse:  [69-87] 80  Resp:  [10-44] 15  SpO2:  [96 %-100 %] 100 %  BP: (139-190)/() 177/134     Weight: 74.9 kg (165 lb 2 oz)  Body mass index is 26.65 kg/m².    SpO2: 100 %  O2 Device (Oxygen Therapy): room air      Intake/Output Summary (Last 24 hours) at 10/28/2019 0934  Last data filed at 10/28/2019 0830  Gross per 24 hour   Intake 410 ml   Output 3450 ml   Net -3040 ml       Lines/Drains/Airways     Peripheral Intravenous Line                 Peripheral IV - Single Lumen 10/27/19 1628 18 G Left Forearm less than 1 day         Peripheral IV - Single Lumen 10/27/19 1755 20 G Right Forearm less than 1 day         Peripheral IV - Single Lumen 10/27/19 2031 20 G Left Antecubital less than 1 day                Physical Exam   Constitutional: He is oriented to person, place, and time. He appears well-developed and well-nourished.   HENT:   Head: Normocephalic and atraumatic.   Eyes: Pupils are equal, round, and reactive to light. Conjunctivae and EOM are normal. No scleral icterus.   Neck: Normal range of motion. Neck supple. No JVD present. No tracheal deviation present. No thyromegaly present.   Cardiovascular: Normal rate, regular rhythm, S1 normal and S2 normal. Exam reveals no gallop and no friction rub.   No murmur heard.  L ICD subclavicular incision well healed   Pulmonary/Chest: Effort normal and breath sounds normal. No respiratory distress. He has no wheezes. He has no rales. He exhibits no tenderness.   Abdominal: Soft. He exhibits no distension.   Musculoskeletal: Normal range of motion. He exhibits no edema.   Neurological: He is alert and oriented to person, place, and time. He has normal strength. No cranial nerve deficit.   Skin: Skin is warm and dry. No rash noted.   Psychiatric: He has a normal mood and affect. His behavior is normal.       Current Medications:   amiodarone  400 mg Oral Daily    aspirin  81 mg Oral Daily    atorvastatin  80 mg Oral Daily     benzonatate  200 mg Oral TID    cefTRIAXone (ROCEPHIN) IVPB  1 g Intravenous Q24H    clopidogrel  75 mg Oral Daily    dextromethorphan-guaifenesin  mg/5 ml  10 mL Oral Q6H    furosemide  40 mg Intravenous BID    heparin (PORCINE)  57.4 Units/kg (Adjusted) Intravenous Once    isosorbide mononitrate  120 mg Oral Daily    lisinopril  40 mg Oral Daily    spironolactone  25 mg Oral Daily      heparin (porcine) in D5W 14 Units/kg/hr (10/28/19 0350)    nitroGLYCERIN Stopped (10/28/19 0933)     acetaminophen, heparin (PORCINE), heparin (PORCINE), morphine, nitroGLYCERIN, ondansetron, sodium chloride 0.9%    Laboratory (all labs reviewed):  CBC:  Recent Labs   Lab 08/04/19  0330 08/05/19  0632 08/06/19  0319 10/27/19  1757 10/28/19  0234   WBC 5.23 5.10  5.10 6.53 4.52 4.32   Hemoglobin 11.4 L 11.3 L  11.3 L 11.3 L 11.2 L 10.3 L   Hematocrit 37.2 L 35.5 L  35.5 L 35.0 L 36.5 L 33.7 L   Platelets 307 299  299 313 270 263       CHEMISTRIES:  Recent Labs   Lab 07/31/19  0406 08/03/19  0820 08/04/19  0330 08/05/19  0632 08/06/19  0319 10/27/19  1757   Glucose 96 86 103 90  90 113 H 89   Sodium 138 134 L 139 138  138 135 L 139   Potassium 3.8 4.5 4.8 4.5  4.5 3.8 3.7   BUN, Bld 23 H 25 H 27 H 25 H  25 H 23 H 19   Creatinine 1.5 H 1.8 H 2.1 H 1.9 H  1.9 H 1.9 H 1.7 H   eGFR if  59 A 47.6 A 39.5 A 44.6 A  44.6 A 44.6 A 51 A   eGFR if non  51 A 41.2 A 34.2 A 38.6 A  38.6 A 38.6 A 44 A   Calcium 8.9 9.2 9.6 9.3  9.3 8.7 9.4   Magnesium 2.0 2.0 2.0 2.1 2.1  --        CARDIAC BIOMARKERS:  Recent Labs   Lab 01/18/18  0008  07/25/19  1319  07/31/19  1009 07/31/19  2243 08/02/19  2217 10/27/19  1757 10/27/19  2141 10/28/19  0230   CPK 84  --  235 H  235 H  --   --   --   --   --   --  71   CPK MB 2.1  --  3.2  --   --   --   --   --   --   --    Troponin I 1.291 H   < > 1.684 H   < > 1.823 H 1.743 H 1.580 H 1.167 H 1.221 H  --     < > = values in this interval not displayed.        COAGS:  Recent Labs   Lab 01/17/18  0636 08/05/19  0632 10/27/19  1757   INR 1.1 1.1 1.1       LIPIDS/LFTS:  Recent Labs   Lab 01/18/18  0254  07/25/19  1319 07/26/19  0203 07/30/19 2001 07/30/19  2232 07/31/19  0406 10/27/19  1757   Cholesterol 156  --   --   --   --  116 L  --   --    Triglycerides 81  --   --   --   --  71  --   --    HDL 38 L  --   --   --   --  11 L  --   --    LDL Cholesterol 101.8  --   --   --   --  90.8  --   --    Non-HDL Cholesterol 118  --   --   --   --  105  --   --    AST 29   < > 43 H 38 35  --  33 23   ALT 33   < > 34 36 43  --  44 26    < > = values in this interval not displayed.       BNP:  Recent Labs   Lab 01/17/18  0636 03/25/18  1752 07/30/19  2001 10/27/19  1757   BNP 1,849 H 1,039 H 3,044 H 3,317 H       TSH:        Free T4:        Diagnostic Results:  ECG (personally reviewed and interpreted tracing(s)):  10/27/19 1736 SR 78, PVCs, NSSTTW changes (lat TWI not as prov vs tracing 8/5/19)    Chest X-Ray (personally reviewed and interpreted image(s)): 10/27/19 ?RLL PNA, CMeg, L ICD (1 lead)    Echo: 7/26/19  (repeat ordered)  · Moderately decreased left ventricular systolic function. The estimated ejection fraction is 30%  · Moderate concentric left ventricular hypertrophy.  · Grade III (severe) left ventricular diastolic dysfunction consistent with restrictive physiology.  · Elevated left atrial pressure.  · Mild right ventricular enlargement.  · Moderate left atrial enlargement.  · Mild right atrial enlargement.  · Moderate-to-severe mitral regurgitation.  · Moderate tricuspid regurgitation.  · Mild pulmonic regurgitation.  · Intermediate central venous pressure (8 mm Hg).  · The estimated PA systolic pressure is 35 mm Hg  · Pulmonary hypertension present.     Cath: 7/26/19  1.  Severe triple-vessel coronary artery disease  2.  Patent BARAJAS to LAD, patent SVG to diagonal 1 and patent SVG to circumflex.  No culprit stenosis noted in these grafts  3.  Mid to distal RCA  .  Distal RCA fills with collaterals from the left.  Unchanged from prior.  Findings/Key Components:  LVEDP: 19 mmHg  Dominance: Right   LM:  Patent with no significant stenosis  LAD:  Diffusely disease proximal to mid.  Fills with LIMA to LAD and SVG to diagonal 1  LCx:  Native vessel diffusely disease.  Fills with SVG to OM2  RCA:  Proximal 70% stenosis.  Distal .  Fills with collaterals from the left.  Hemostasis:  RFA 5 Syriac sheath.  Manual pressure held in the cath lab for hemostasis  Impression:  Severe triple-vessel coronary artery disease.  Patent LIMA to LAD, SVG to OM and SVG to diagonal.  No acute culprit lesion identified  Plan:  Aspirin 81 mg daily indefinitely  Plavix 75 mg daily for at least 1 year  Follow-up in Cardiology Clinic      Assessment and Plan:     * NSTEMI (non-ST elevated myocardial infarction)  Elev trop with atyp CP  Trop elev similar to prev with cath at that time without culprit  ?CP r/t pulm infil/PNA vs demand in setting of significant HTN  Agree with ASA/Plavix/heparin  Cont NTG gtt for acute BP control  Cont Imdur/ACEi  Restart coreg 25mg bid  Check echo  Will consider MPI (vs cath vs med rx) pending clinical course for localization of ischemia      Ischemic cardiomyopathy  As per NSTEMI section  Known severe LV dysfxn  Cont ACEi/aldact  Restart coreg    Syncope  Reported by pt 3 days ago  [employer] called to interrogate ICD    Mixed hyperlipidemia  Cont statin    Essential hypertension  Cont med rx  NTG gtt for acute control  Cont ACEi  Add coreg    CKD stage 3  Creat stable    ICD (implantable cardioverter-defibrillator), single, in situ  [employer] called to interrogate device    Tobacco abuse  Pt reports abstinence    History of cocaine use  Urine tox pending        VTE Risk Mitigation (From admission, onward)         Ordered     IP VTE HIGH RISK PATIENT  Once      10/28/19 0322     Place sequential compression device  Until discontinued      10/28/19 0322     heparin  25,000 units in dextrose 5% (100 units/ml) IV bolus from bag INITIAL BOLUS (max bolus 4000 units)  Once      10/27/19 1926     heparin 25,000 units in dextrose 5% 250 mL (100 units/mL) infusion LOW INTENSITY nomogram - [facility]  Continuous      10/27/19 1926     heparin 25,000 units in dextrose 5% (100 units/ml) IV bolus from bag - ADDITIONAL PRN BOLUS - 60 units/kg (max bolus 4000 units)  As needed (PRN)      10/27/19 1926     heparin 25,000 units in dextrose 5% (100 units/ml) IV bolus from bag - ADDITIONAL PRN BOLUS - 30 units/kg (max bolus 4000 units)  As needed (PRN)      10/27/19 1926              Pt seen in ICU, case d/w Dr. Knott and RN.  Critical care time 40min    Thank you for your consult. I will follow-up with patient. Please contact us if you have any additional questions.    Aftab Lemus MD  Cardiology   [facility]

## 2019-10-28 NOTE — HPI
Abdoul Ochoa is a 54 y.o. male that (in part)  has a past medical history of Alcoholic intoxication without complication, Anemia in stage 3 chronic kidney disease, CKD stage 3, Coronary artery disease, Coronary artery disease of native artery of native heart with stable angina pectoris, Essential hypertension, History of CVA (cerebrovascular accident), Ischemic cardiomyopathy, MI (myocardial infarction), Mixed hyperlipidemia, Nonsustained ventricular tachycardia, and Pneumonia of both lungs due to infectious organism.  has a past surgical history that includes Bypass Graft; Tracheostomy closure (2014); Cardiac surgery (01/2018); Coronary angioplasty with stent; and Coronary angiography including bypass grafts with catheterization of left heart (N/A, 7/26/2019). Presents to [facility] Emergency Department complaining of chest pain. Two days duration.  Initially patient took 1 nitroglycerin at home, 1 with EMS, and 2 additional nitroglycerins while in the ED.  Minimal relief from that.  Associated with nausea and diaphoresis.  Complained of cough and congestion with subjective fever.  Denies syncope, cyanosis, or palpitations.  Positive for peripheral edema, paroxysmal nocturnal dyspnea, and dyspnea with exertion.    In the emergency department routine laboratory studies, chest x-ray, EKG, cardiac enzymes were obtained.  There is evidence of markedly elevated BNP 3300, JVD, rales, and peripheral edema. Findings consistent with non ST elevation MI as well as acute CHF exacerbation.  He had a 2D echo performed in July with an EF of 30%.  Yes T-wave inversions in his lateral leads but no evidence of acute ischemia on EKG.  Chest x-ray concerning for possibility of pneumonia.  Given empiric antibiotics pending further culture data.  Will repeat chest x-ray with PA and lateral.    Hospital medicine has been asked to admit for further evaluation and treatment.

## 2019-10-28 NOTE — ASSESSMENT & PLAN NOTE
Elev trop with atyp CP  Trop elev similar to prev with cath at that time without culprit  ?CP r/t pulm infil/PNA vs demand in setting of significant HTN  Agree with ASA/Plavix/heparin  Cont NTG gtt for acute BP control  Cont Imdur/ACEi  Restart coreg 25mg bid  Check echo  Will consider MPI (vs cath vs med rx) pending clinical course for localization of ischemia

## 2019-10-28 NOTE — NURSING
Called JILLIAN. Pt c/o generalized pan 7/10. States its not chest pain.  Also pt hypertensive. Awaiting orders from MD. Pt sleeping at this time.

## 2019-10-28 NOTE — PLAN OF CARE
Pt remains in ICU today with plans for possible cath lab tomorrow. NPO past midnight. Seen by cardiology. Nitro gtt off. Heparin gtt continues. Next PTT 2300. No chest pain. Remains free from fall, injury, or breakdown throughout shift.

## 2019-10-28 NOTE — CARE UPDATE
[facility]  ICU Multidisciplinary Bedside Rounds   SUMMARY     Date: 10/28/2019    Prehospitalization: Home  Admit Date / LOS : 10/27/2019/ 1 days    Diagnosis: NSTEMI (non-ST elevated myocardial infarction)    Consults:        Active: Cardio       Needed: N/A     Code Status: Full Code   Advanced Directive: <no information>    LDA: PIV       Central Lines/Site/Justification:Patient Does Not Have Central Line       Urinary Cath/Order/Justification:Patient Does Not Have Urinary Catheter    Vasopressors/Infusions:    heparin (porcine) in D5W 14 Units/kg/hr (10/28/19 0350)    nitroGLYCERIN 20 mcg/min (10/28/19 0600)          GOALS: Volume/ Hemodynamic: N/A                     RASS: N/A    CAM ICU: N/A  Pain Management: PO       Pain Controlled: yes     Rhythm: NSR and Pacemaker    Respiratory Device: Nasal Cannula                  Most Recent SBT/ SAT: N/A       MOVE Screen: N/A    VTE Prophylaxis: Pharm  Mobility: Bedrest  Stress Ulcer Prophylaxis: No    Dietary: NPO  Tolerance: not applicable  /  Advancement: no    Isolation: No active isolations    Restraints: No    Significant Dates:  Post Op Date: N/A  Rescue Date: N/A  Imaging/ Diagnostics: N/A    Noteworthy Labs: PTT Q 6 hrs and PRN    CBC/Anemia Labs: Coags:    Recent Labs   Lab 10/27/19  1757 10/28/19  0234   WBC 4.52 4.32   HGB 11.2* 10.3*   HCT 36.5* 33.7*    263   MCV 80* 80*   RDW 19.4* 19.3*    Recent Labs   Lab 10/27/19  1757 10/28/19  0234   INR 1.1  --    APTT 25.9 35.9*        Chemistries:   Recent Labs   Lab 10/27/19  1757      K 3.7      CO2 25   BUN 19   CREATININE 1.7*   CALCIUM 9.4   PROT 6.7   BILITOT 1.8*   ALKPHOS 93   ALT 26   AST 23        Cardiac Enzymes: Ejection Fractions:    Recent Labs     10/27/19  1757 10/27/19  2141 10/28/19  0230   CPK  --   --  71   TROPONINI 1.167* 1.221*  --     No results found for: EF     POCT Glucose: HbA1c:    No results for input(s): POCTGLUCOSE in the last 168 hours.  Hemoglobin A1C   Date Value Ref Range Status   01/18/2018 5.3 4.0 - 5.6 % Final     Comment:     According to ADA guidelines, hemoglobin A1c <7.0% represents  optimal control in non-pregnant diabetic patients. Different  metrics may apply to specific patient populations.   Standards of Medical Care in Diabetes-2016.  For the purpose of screening for the presence of diabetes:  <5.7%     Consistent with the absence of diabetes  5.7-6.4%  Consistent with increasing risk for diabetes   (prediabetes)  >or=6.5%  Consistent with diabetes  Currently, no consensus exists for use of hemoglobin A1c  for diagnosis of diabetes for children.  This Hemoglobin A1c assay has significant interference with fetal   hemoglobin   (HbF). The results are invalid for patients with abnormal amounts of   HbF,   including those with known Hereditary Persistence   of Fetal Hemoglobin. Heterozygous hemoglobin variants (HbAS, HbAC,   HbAD, HbAE, HbA2) do not significantly interfere with this assay;   however, presence of multiple variants in a sample may impact the %   interference.          Needs from Care Team: none     ICU LOS 7h  Level of Care: Critical Care

## 2019-10-29 PROBLEM — R10.13 EPIGASTRIC PAIN: Status: RESOLVED | Noted: 2019-01-01 | Resolved: 2019-01-01

## 2019-10-29 NOTE — PLAN OF CARE
Mr Abdoul Ochoa is a 54 y.o. man with combined CHF (EF 30%, G3DD) with ICD, 3v CAD, CKD3 who presented with NSTEMI. Admitted to ICU for nitro gtt. Started on hep gtt, asa, plavix, statin, BB, ACEi. Cardiology following. Weaned off nitro gtt. Plan for stress 10/29.     Previously started on CTX for RLL opacity on CXR. On review of records, this has been present for months. No fever or leukocytosis. Does have complaint of congestion- on PRN meds. Antibiotics discontinued.     To do  - follow up stress test 10/29 and Cardiology recs  - iron studies sent for microcytic anemia  - anticipate discharge to home with Cardiology follow up    Kendra Gaffney MD  10/29/2019 9:39 AM

## 2019-10-29 NOTE — ASSESSMENT & PLAN NOTE
Presented with typical angina  Started/continued aspirin, Plavix, heparin drip, Coreg, lisinopril, statin  Previously requiring nitro drip.  Now discontinued.  Not requiring IV morphine.  Cardiology following  Stress test scheduled for 10/29

## 2019-10-29 NOTE — NURSING
1652 - Dr. Lemus called but no answer. Dr. Gaffney notified of pt's chest pain that is 7 (0-10) midsternum that is non radiating. Pt states that he has had this pain all day. /77 HR 69. There is no order for nitro or morphine. Pt on 3 liters NC. Dr. Gaffney placed an order for nitro PRN and to notify her if no relief.     1718 - nitro SL given and no relief    1741 - nitro SL given no relief    1745 - pt is upset about dinner not being delivered and is anxious about eating dinner.     1751 - dinner is brought to room and pt is wanting to eat.     1834 - Dr. Gaffney updated that he received nitro X2 and some relief and not wanting more nitro at this time.     1839 - Dr. Gaffney notified me that she spoke with Dr. Lemus and stress test showed no ischemia, plan to up anti anginal meds, and to make pt NPO after midnight in case he decides to perform left heart cath in the AM. Dr. Gaffney also ordered Morphine 2 mg PRN every 15 min for chest pain. I questioned when to given morphine. She states to do nitro interventions first for chest pain and if not relieved then give the morphine.

## 2019-10-29 NOTE — CARE UPDATE
[facility]  ICU Multidisciplinary Bedside Rounds   SUMMARY     Date: 10/29/2019    Prehospitalization: Home  Admit Date / LOS : 10/27/2019/ 2 days    Diagnosis: NSTEMI (non-ST elevated myocardial infarction)    Consults:        Active: Cardio       Needed: N/A     Code Status: Full Code   Advanced Directive: <no information>    LDA: PIV       Central Lines/Site/Justification:Patient Does Not Have Central Line       Urinary Cath/Order/Justification:Patient Does Not Have Urinary Catheter    Vasopressors/Infusions:    heparin (porcine) in D5W 16.069 Units/kg/hr (10/29/19 0500)       Pain Management: IV       Pain Controlled: yes     Rhythm: SB    Respiratory Device: Nasal Cannula              VTE Prophylaxis: Pharm and Mechanical  Mobility: Bedrest  Stress Ulcer Prophylaxis: No    Dietary: NPO     Isolation: No active isolations    Restraints: No    Significant Dates:  Post Op Date: N/A  Rescue Date: N/A  Imaging/ Diagnostics: N/A    Noteworthy Labs:  none    CBC/Anemia Labs: Coags:    Recent Labs   Lab 10/27/19  1757 10/28/19  0234   WBC 4.52 4.32   HGB 11.2* 10.3*   HCT 36.5* 33.7*    263   MCV 80* 80*   RDW 19.4* 19.3*    Recent Labs   Lab 10/27/19  1757  10/28/19  1634 10/28/19  2300 10/29/19  0358   INR 1.1  --   --   --   --    APTT 25.9   < > 38.2* 50.7* 46.0*    < > = values in this interval not displayed.        Chemistries:   Recent Labs   Lab 10/27/19  1757      K 3.7      CO2 25   BUN 19   CREATININE 1.7*   CALCIUM 9.4   PROT 6.7   BILITOT 1.8*   ALKPHOS 93   ALT 26   AST 23        Cardiac Enzymes: Ejection Fractions:    Recent Labs     10/27/19  1757 10/27/19  2141 10/28/19  0230   CPK  --   --  71   TROPONINI 1.167* 1.221*  --     No results found for: EF     POCT Glucose: HbA1c:    No results for input(s): POCTGLUCOSE in the last 168 hours. Hemoglobin A1C   Date Value Ref Range Status   01/18/2018 5.3 4.0 - 5.6 % Final     Comment:     According to ADA guidelines,  hemoglobin A1c <7.0% represents  optimal control in non-pregnant diabetic patients. Different  metrics may apply to specific patient populations.   Standards of Medical Care in Diabetes-2016.  For the purpose of screening for the presence of diabetes:  <5.7%     Consistent with the absence of diabetes  5.7-6.4%  Consistent with increasing risk for diabetes   (prediabetes)  >or=6.5%  Consistent with diabetes  Currently, no consensus exists for use of hemoglobin A1c  for diagnosis of diabetes for children.  This Hemoglobin A1c assay has significant interference with fetal   hemoglobin   (HbF). The results are invalid for patients with abnormal amounts of   HbF,   including those with known Hereditary Persistence   of Fetal Hemoglobin. Heterozygous hemoglobin variants (HbAS, HbAC,   HbAD, HbAE, HbA2) do not significantly interfere with this assay;   however, presence of multiple variants in a sample may impact the %   interference.          Needs from Care Team: Stress test ordered for AM     ICU LOS 1d 5h  Level of Care: OK to Transfer

## 2019-10-29 NOTE — ASSESSMENT & PLAN NOTE
Presented with angina  BNP elevated at >3300 but appears euvolemic on exam  Repeat TTE 10/28 shows EF 30% and grade 3 diastolic dysfunction with severe global hypokinesis  Continue carvedilol, lisinopril, spironolactone  Continue oral Lasix for volume control  Cardiology following.  Stress test today.  In prior admission, patient had episodes of wide complex tachycardia-he is on amiodarone for this

## 2019-10-29 NOTE — HOSPITAL COURSE
Admitted to ICU with NSTEMI. Medically treated with asa, plavix, heparin gtt, carvedilol, lisinopril, atorvastatin. Required nitro gtt for angina. Cardiology consulted. Repeat TTE 10/28 EF 30%, G3DD, severe global hypokinesis. Stress test 10/29 did not show any active ischemia but did confirm areas of infarct. Patient had some atypical chest pain (sharp pain L chest) but no typical angina after his stress test. No chest pain 10/30. Per Cardiology, another angiogram would not benefit him. He is medically stable for discharge. He will follow up in Cardiology clinic. He is on max carvedilol and isosorbide mononitrate for chest pain control. Next medication to add if continued pain is ranexa.

## 2019-10-29 NOTE — ASSESSMENT & PLAN NOTE
Reported by pt 3 days prior to adm.  Biotronik ICD interrogation neg for sustained arrhythmia (NSVT/PSVT noted).  Normal device fxn.

## 2019-10-29 NOTE — PROGRESS NOTES
[facility] Hospital Medicine  Progress Note    Patient Name: Abdoul Ochoa  MRN: [id number]  Patient Class: IP- Inpatient   Admission Date: 10/27/2019  Length of Stay: 2 days  Attending Physician: Kendra Gaffney MD  Primary Care Provider: Primary Doctor No        Subjective:     Principal Problem:NSTEMI (non-ST elevated myocardial infarction)        HPI:  Abdoul Ochoa is a 54 y.o. male that (in part)  has a past medical history of Alcoholic intoxication without complication, Anemia in stage 3 chronic kidney disease, CKD stage 3, Coronary artery disease, Coronary artery disease of native artery of native heart with stable angina pectoris, Essential hypertension, History of CVA (cerebrovascular accident), Ischemic cardiomyopathy, MI (myocardial infarction), Mixed hyperlipidemia, Nonsustained ventricular tachycardia, and Pneumonia of both lungs due to infectious organism.  has a past surgical history that includes Bypass Graft; Tracheostomy closure (2014); Cardiac surgery (01/2018); Coronary angioplasty with stent; and Coronary angiography including bypass grafts with catheterization of left heart (N/A, 7/26/2019). Presents to [facility] Emergency Department complaining of chest pain. Two days duration.  Initially patient took 1 nitroglycerin at home, 1 with EMS, and 2 additional nitroglycerins while in the ED.  Minimal relief from that.  Associated with nausea and diaphoresis.  Complained of cough and congestion with subjective fever.  Denies syncope, cyanosis, or palpitations.  Positive for peripheral edema, paroxysmal nocturnal dyspnea, and dyspnea with exertion.    In the emergency department routine laboratory studies, chest x-ray, EKG, cardiac enzymes were obtained.  There is evidence of markedly elevated BNP 3300, JVD, rales, and peripheral edema. Findings consistent with non ST elevation MI as well as acute CHF exacerbation.  He had a 2D echo performed in July with an  EF of 30%.  Yes T-wave inversions in his lateral leads but no evidence of acute ischemia on EKG.  Chest x-ray concerning for possibility of pneumonia.  Given empiric antibiotics pending further culture data.  Will repeat chest x-ray with PA and lateral.    Hospital medicine has been asked to admit for further evaluation and treatment.     Overview/Hospital Course:  Admitted to ICU with NSTEMI. Medically treated with asa, plavix, heparin gtt, carvedilol, lisinopril, atorvastatin. Required nitro gtt for angina. Cardiology consulted. Repeat TTE 10/28 EF 30%, G3DD, severe global hypokinesis. Stress test pending 10/29.     Interval History:  Complains of poor sleep.  Also complains of chest congestion. No other complaints.    Review of Systems   Constitutional: Negative for chills and fever.   HENT: Positive for congestion.    Respiratory: Negative for cough, chest tightness, shortness of breath and wheezing.    Cardiovascular: Negative for chest pain, palpitations and leg swelling.   Gastrointestinal: Negative for abdominal pain, constipation, diarrhea, nausea and vomiting.   Genitourinary: Negative for difficulty urinating.     Objective:     Vital Signs (Most Recent):  Temp: 98.2 °F (36.8 °C) (10/29/19 0830)  Pulse: 66 (10/29/19 0800)  Resp: 17 (10/29/19 0800)  BP: (!) 146/86 (10/29/19 0800)  SpO2: (!) 88 % (10/29/19 0825) Vital Signs (24h Range):  Temp:  [97.4 °F (36.3 °C)-98.2 °F (36.8 °C)] 98.2 °F (36.8 °C)  Pulse:  [55-80] 66  Resp:  [13-30] 17  SpO2:  [88 %-100 %] 88 %  BP: ()/() 146/86     Weight: 74.9 kg (165 lb 2 oz)  Body mass index is 26.65 kg/m².    Intake/Output Summary (Last 24 hours) at 10/29/2019 0852  Last data filed at 10/29/2019 0700  Gross per 24 hour   Intake 257.12 ml   Output 300 ml   Net -42.88 ml      Physical Exam   Constitutional: He appears well-developed and well-nourished. No distress.   HENT:   Head: Normocephalic and atraumatic.   Mouth/Throat: Oropharynx is clear and moist.    Neck: No JVD present.   Cardiovascular: Normal rate, regular rhythm, normal heart sounds and intact distal pulses. Exam reveals no gallop and no friction rub.   No murmur heard.  Pulmonary/Chest: Effort normal and breath sounds normal. No stridor. No respiratory distress. He has no wheezes. He has no rales.   2 L nasal cannula   Abdominal: Soft. Bowel sounds are normal. He exhibits no distension and no mass. There is no tenderness. There is no guarding.   Musculoskeletal: He exhibits no edema or deformity.   Neurological: He is alert.   Skin: Skin is warm and dry. He is not diaphoretic.   Nursing note and vitals reviewed.      Significant Labs: All pertinent labs within the past 24 hours have been reviewed.    Significant Imaging: I have reviewed and interpreted all pertinent imaging results/findings within the past 24 hours.      Assessment/Plan:      * NSTEMI (non-ST elevated myocardial infarction)  Presented with typical angina  Started/continued aspirin, Plavix, heparin drip, Coreg, lisinopril, statin  Previously requiring nitro drip.  Now discontinued.  Not requiring IV morphine.  Cardiology following  Stress test scheduled for 10/29        ICD (implantable cardioverter-defibrillator), single, in situ  No discharges per patient  Biotronik to interrogate device        Syncope  See NSTEMI      History of cocaine use  UDS negative on admission      CKD stage 3  Creatinine stable at baseline 1.7  Avoid nephrotoxins, renally dose all medications      Ischemic cardiomyopathy  See heart failure      Anemia in stage 3 chronic kidney disease  Hemoglobin is stable 10-11  No prior iron studies-check with a.m. labs      Coronary artery disease involving native coronary artery of native heart  7/2019 [facility] showed severe 3v CAD  Now with NSTEMI  Cardiology following- stress test today      History of CVA (cerebrovascular accident)  No acute issues  Continue aspirin, statin, BP control    Tobacco abuse  Historical  Patient  states that he is no longer smoking      Mixed hyperlipidemia  No recent lipid panel-check  Continue atorvastatin      Essential hypertension  BP is generally well controlled  Continue carvedilol, lisinopril, spironolactone      Chronic combined systolic and diastolic heart failure  Presented with angina  BNP elevated at >3300 but appears euvolemic on exam  Repeat TTE 10/28 shows EF 30% and grade 3 diastolic dysfunction with severe global hypokinesis  Continue carvedilol, lisinopril, spironolactone  Continue oral Lasix for volume control  Cardiology following.  Stress test today.  In prior admission, patient had episodes of wide complex tachycardia-he is on amiodarone for this        VTE Risk Mitigation (From admission, onward)         Ordered     IP VTE HIGH RISK PATIENT  Once      10/28/19 0322     Place sequential compression device  Until discontinued      10/28/19 0322     heparin 25,000 units in dextrose 5% (100 units/ml) IV bolus from bag INITIAL BOLUS (max bolus 4000 units)  Once      10/27/19 1926     heparin 25,000 units in dextrose 5% 250 mL (100 units/mL) infusion LOW INTENSITY nomogram - [facility]  Continuous      10/27/19 1926     heparin 25,000 units in dextrose 5% (100 units/ml) IV bolus from bag - ADDITIONAL PRN BOLUS - 60 units/kg (max bolus 4000 units)  As needed (PRN)      10/27/19 1926     heparin 25,000 units in dextrose 5% (100 units/ml) IV bolus from bag - ADDITIONAL PRN BOLUS - 30 units/kg (max bolus 4000 units)  As needed (PRN)      10/27/19 1926                Critical care time spent on the evaluation and treatment of severe organ dysfunction, review of pertinent labs and imaging studies, discussions with consulting providers and discussions with patient/family: 30 minutes.      Kendra Gaffney MD  Department of Hospital Medicine   [facility]

## 2019-10-29 NOTE — PLAN OF CARE
Pt remains in ICU. Still reports some shortness of breath but remained chest pain free and off Tridil gtt. No issues overnight. NPO. Nuc stress test ordered for this morning.

## 2019-10-29 NOTE — SUBJECTIVE & OBJECTIVE
Past Medical History:   Diagnosis Date    Alcoholic intoxication without complication     Anemia in stage 3 chronic kidney disease 1/18/2018    CKD stage 3 7/31/2019    Coronary artery disease     Coronary artery disease of native artery of native heart with stable angina pectoris 1/18/2018    Essential hypertension 1/17/2018    History of CVA (cerebrovascular accident) 1/18/2018    Ischemic cardiomyopathy 7/27/2019    MI (myocardial infarction)     2013, 2015, 2016, 2018 (total of 4 stents), triple CABG January 2018    Mixed hyperlipidemia 1/17/2018    Nonsustained ventricular tachycardia 7/31/2019    Pneumonia of both lungs due to infectious organism        Past Surgical History:   Procedure Laterality Date    BYPASS GRAFT      CARDIAC SURGERY  01/2018    three vessel CABG    CORONARY ANGIOGRAPHY INCLUDING BYPASS GRAFTS WITH CATHETERIZATION OF LEFT HEART N/A 7/26/2019    Procedure: ANGIOGRAM, CORONARY, INCLUDING BYPASS GRAFT, WITH LEFT HEART CATHETERIZATION;  Surgeon: Chadwick Ramirez MD;  Location: [facility] CATH LAB;  Service: Cardiology;  Laterality: N/A;  rt groin, 5 fr, visipaque    CORONARY ANGIOPLASTY WITH STENT PLACEMENT      4 stents    TRACHEOSTOMY CLOSURE  2014       Review of patient's allergies indicates:  No Known Allergies    No current facility-administered medications on file prior to encounter.      Current Outpatient Medications on File Prior to Encounter   Medication Sig    acetaminophen (TYLENOL) 500 MG tablet Take 1 tablet (500 mg total) by mouth every 6 (six) hours as needed.    amiodarone (PACERONE) 400 MG tablet Take 1 tablet (400 mg total) by mouth 2 (two) times daily. For two weeks then decrease to once daily on 8/19    aspirin 81 MG Chew Take 81 mg by mouth once daily.    atorvastatin (LIPITOR) 80 MG tablet Take 1 tablet (80 mg total) by mouth once daily.    furosemide (LASIX) 40 MG tablet Take 1 tablet (40 mg total) by mouth 2 (two) times daily.    isosorbide  "mononitrate (IMDUR) 120 MG 24 hr tablet Take 1 tablet (120 mg total) by mouth once daily.    nitroGLYCERIN (NITROSTAT) 0.4 MG SL tablet Place 1 tablet (0.4 mg total) under the tongue every 5 (five) minutes as needed for Chest pain.    spironolactone (ALDACTONE) 25 MG tablet Take 1 tablet (25 mg total) by mouth once daily.    carvedilol (COREG) 25 MG tablet Take 1 tablet (25 mg total) by mouth 2 (two) times daily.    clopidogrel (PLAVIX) 75 mg tablet Take 1 tablet (75 mg total) by mouth once daily.    lisinopril (PRINIVIL,ZESTRIL) 40 MG tablet Take 1 tablet (40 mg total) by mouth once daily.     Family History     Problem Relation (Age of Onset)    Heart disease Mother, Father    Hypertension Mother, Father        Tobacco Use    Smoking status: Former Smoker     Types: Cigarettes     Last attempt to quit: 2014     Years since quittin.4    Smokeless tobacco: Never Used   Substance and Sexual Activity    Alcohol use: Not Currently     Frequency: Never     Comment: beer on the weekends "barely"    Drug use: No    Sexual activity: Yes     Partners: Female     Review of Systems   Gastrointestinal: Negative for melena.   Genitourinary: Negative for hematuria.     Objective:     Vital Signs (Most Recent):  Temp: 97.4 °F (36.3 °C) (10/28/19 2000)  Pulse: 63 (10/29/19 0400)  Resp: (!) 30 (10/29/19 0400)  BP: 138/84 (10/29/19 0400)  SpO2: 98 % (10/29/19 0400) Vital Signs (24h Range):  Temp:  [97.4 °F (36.3 °C)-98.1 °F (36.7 °C)] 97.4 °F (36.3 °C)  Pulse:  [55-82] 63  Resp:  [11-30] 30  SpO2:  [95 %-100 %] 98 %  BP: ()/() 138/84     Weight: 74.9 kg (165 lb 2 oz)  Body mass index is 26.65 kg/m².    SpO2: 98 %  O2 Device (Oxygen Therapy): nasal cannula      Intake/Output Summary (Last 24 hours) at 10/29/2019 0501  Last data filed at 10/29/2019 0000  Gross per 24 hour   Intake 228.72 ml   Output 200 ml   Net 28.72 ml       Lines/Drains/Airways     Peripheral Intravenous Line                 " Peripheral IV - Single Lumen 10/27/19 1628 18 G Left Forearm 1 day         Peripheral IV - Single Lumen 10/27/19 1755 20 G Right Forearm 1 day         Peripheral IV - Single Lumen 10/27/19 2031 20 G Left Antecubital 1 day              Exam unchanged vs 10/28/19  Physical Exam   Constitutional: He is oriented to person, place, and time. He appears well-developed and well-nourished.   HENT:   Head: Normocephalic and atraumatic.   Eyes: Pupils are equal, round, and reactive to light. Conjunctivae and EOM are normal. No scleral icterus.   Neck: Normal range of motion. Neck supple. No JVD present. No tracheal deviation present. No thyromegaly present.   Cardiovascular: Normal rate, regular rhythm, S1 normal and S2 normal. Exam reveals no gallop and no friction rub.   No murmur heard.  L ICD subclavicular incision well healed   Pulmonary/Chest: Effort normal and breath sounds normal. No respiratory distress. He has no wheezes. He has no rales. He exhibits no tenderness.   Abdominal: Soft. He exhibits no distension.   Musculoskeletal: Normal range of motion. He exhibits no edema.   Neurological: He is alert and oriented to person, place, and time. He has normal strength. No cranial nerve deficit.   Skin: Skin is warm and dry. No rash noted.   Psychiatric: He has a normal mood and affect. His behavior is normal.       Current Medications:   amiodarone  400 mg Oral Daily    aspirin  81 mg Oral Daily    atorvastatin  80 mg Oral Daily    benzonatate  200 mg Oral TID    carvedilol  25 mg Oral BID    cefTRIAXone (ROCEPHIN) IVPB  1 g Intravenous Q24H    clopidogrel  75 mg Oral Daily    dextromethorphan-guaifenesin  mg/5 ml  10 mL Oral Q6H    furosemide  40 mg Intravenous BID    heparin (PORCINE)  57.4 Units/kg (Adjusted) Intravenous Once    isosorbide mononitrate  120 mg Oral Daily    lisinopril  40 mg Oral Daily    spironolactone  25 mg Oral Daily      heparin (porcine) in D5W 16.069 Units/kg/hr (10/29/19  0000)    nitroGLYCERIN Stopped (10/28/19 1402)     acetaminophen, heparin (PORCINE), heparin (PORCINE), morphine, nitroGLYCERIN, ondansetron, sodium chloride 0.9%    Laboratory (all labs reviewed):  CBC:  Recent Labs   Lab 08/04/19  0330 08/05/19  0632 08/06/19  0319 10/27/19  1757 10/28/19  0234   WBC 5.23 5.10  5.10 6.53 4.52 4.32   Hemoglobin 11.4 L 11.3 L  11.3 L 11.3 L 11.2 L 10.3 L   Hematocrit 37.2 L 35.5 L  35.5 L 35.0 L 36.5 L 33.7 L   Platelets 307 299  299 313 270 263       CHEMISTRIES:  Recent Labs   Lab 07/31/19  0406 08/03/19  0820 08/04/19  0330 08/05/19  0632 08/06/19  0319 10/27/19  1757   Glucose 96 86 103 90  90 113 H 89   Sodium 138 134 L 139 138  138 135 L 139   Potassium 3.8 4.5 4.8 4.5  4.5 3.8 3.7   BUN, Bld 23 H 25 H 27 H 25 H  25 H 23 H 19   Creatinine 1.5 H 1.8 H 2.1 H 1.9 H  1.9 H 1.9 H 1.7 H   eGFR if  59 A 47.6 A 39.5 A 44.6 A  44.6 A 44.6 A 51 A   eGFR if non  51 A 41.2 A 34.2 A 38.6 A  38.6 A 38.6 A 44 A   Calcium 8.9 9.2 9.6 9.3  9.3 8.7 9.4   Magnesium 2.0 2.0 2.0 2.1 2.1  --        CARDIAC BIOMARKERS:  Recent Labs   Lab 01/18/18  0008  07/25/19  1319  07/31/19  1009 07/31/19  2243 08/02/19  2217 10/27/19  1757 10/27/19  2141 10/28/19  0230   CPK 84  --  235 H  235 H  --   --   --   --   --   --  71   CPK MB 2.1  --  3.2  --   --   --   --   --   --   --    Troponin I 1.291 H   < > 1.684 H   < > 1.823 H 1.743 H 1.580 H 1.167 H 1.221 H  --     < > = values in this interval not displayed.       COAGS:  Recent Labs   Lab 01/17/18  0636 08/05/19  0632 10/27/19  1757   INR 1.1 1.1 1.1       LIPIDS/LFTS:  Recent Labs   Lab 01/18/18  0254  07/25/19  1319 07/26/19  0203 07/30/19 2001 07/30/19  2232 07/31/19  0406 10/27/19  1757   Cholesterol 156  --   --   --   --  116 L  --   --    Triglycerides 81  --   --   --   --  71  --   --    HDL 38 L  --   --   --   --  11 L  --   --    LDL Cholesterol 101.8  --   --   --   --  90.8  --   --    Non-HDL  Cholesterol 118  --   --   --   --  105  --   --    AST 29   < > 43 H 38 35  --  33 23   ALT 33   < > 34 36 43  --  44 26    < > = values in this interval not displayed.       BNP:  Recent Labs   Lab 01/17/18  0636 03/25/18  1752 07/30/19  2001 10/27/19  1757   BNP 1,849 H 1,039 H 3,044 H 3,317 H       TSH:        Free T4:        Diagnostic Results:  ECG (personally reviewed and interpreted tracing(s)):  10/27/19 1736 SR 78, PVCs, NSSTTW changes (lat TWI not as prov vs tracing 8/5/19)    Chest X-Ray (personally reviewed and interpreted image(s)): 10/27/19 ?RLL PNA, CMeg, L ICD (1 lead)    Echo: 10/28/19 (images personally reviewed and interpreted; c/w report 7/2019: similar LVEF, MR not noted)  · Moderately decreased left ventricular systolic function. The estimated ejection fraction is 30%  · Severe global hypokinetic wall motion. Cannot exclude septal WMA.  · Grade III (severe) left ventricular diastolic dysfunction consistent with restrictive physiology.  · Mild right ventricular enlargement.  · Mildly to moderately reduced right ventricular systolic function.  · Mild tricuspid regurgitation.  · The estimated PA systolic pressure is 49 mm Hg  · Pulmonary hypertension present.     Cath: 7/26/19 (images personally reviewed and interpreted)  1.  Severe triple-vessel coronary artery disease  2.  Patent BARAJAS to LAD, patent SVG to diagonal 1 and patent SVG to circumflex.  No culprit stenosis noted in these grafts  3.  Mid to distal RCA .  Distal RCA fills with collaterals from the left.  Unchanged from prior.  Findings/Key Components:  LVEDP: 19 mmHg  Dominance: Right   LM:  Patent with no significant stenosis  LAD:  Diffusely disease proximal to mid.  Fills with LIMA to LAD and SVG to diagonal 1  LCx:  Native vessel diffusely disease.  Fills with SVG to OM2  RCA:  Proximal 70% stenosis.  Distal .  Fills with collaterals from the left.  Hemostasis:  RFA 5 [place] sheath.  Manual pressure held in the cath lab for  hemostasis  Impression:  Severe triple-vessel coronary artery disease.  Patent LIMA to LAD, SVG to OM and SVG to diagonal.  No acute culprit lesion identified  Plan:  Aspirin 81 mg daily indefinitely  Plavix 75 mg daily for at least 1 year  Follow-up in Cardiology Clinic

## 2019-10-29 NOTE — HOSPITAL COURSE
10/27/19: adm with CP and htn urg with elev trop ?NSTEMI vs demand.  Also reported LOC 3 days ago, Biotronik ICD interrogation without sustained arrhythmia.  10/29/19: MPI with dense inferior scar, no ischemia.  RCA  (in-stent) noted on prior cath.    Interval Hx: waxing and waning atyp CP, none at present.  No sob/palps.  Discussed nuc results.    Tele: SR 60s, NSVT, AIVR (personally reviewed and interpreted)

## 2019-10-29 NOTE — SUBJECTIVE & OBJECTIVE
Interval History:  Complains of poor sleep.  Also complains of chest congestion. No other complaints.    Review of Systems   Constitutional: Negative for chills and fever.   HENT: Positive for congestion.    Respiratory: Negative for cough, chest tightness, shortness of breath and wheezing.    Cardiovascular: Negative for chest pain, palpitations and leg swelling.   Gastrointestinal: Negative for abdominal pain, constipation, diarrhea, nausea and vomiting.   Genitourinary: Negative for difficulty urinating.     Objective:     Vital Signs (Most Recent):  Temp: 98.2 °F (36.8 °C) (10/29/19 0830)  Pulse: 66 (10/29/19 0800)  Resp: 17 (10/29/19 0800)  BP: (!) 146/86 (10/29/19 0800)  SpO2: (!) 88 % (10/29/19 0825) Vital Signs (24h Range):  Temp:  [97.4 °F (36.3 °C)-98.2 °F (36.8 °C)] 98.2 °F (36.8 °C)  Pulse:  [55-80] 66  Resp:  [13-30] 17  SpO2:  [88 %-100 %] 88 %  BP: ()/() 146/86     Weight: 74.9 kg (165 lb 2 oz)  Body mass index is 26.65 kg/m².    Intake/Output Summary (Last 24 hours) at 10/29/2019 0852  Last data filed at 10/29/2019 0700  Gross per 24 hour   Intake 257.12 ml   Output 300 ml   Net -42.88 ml      Physical Exam   Constitutional: He appears well-developed and well-nourished. No distress.   HENT:   Head: Normocephalic and atraumatic.   Mouth/Throat: Oropharynx is clear and moist.   Neck: No JVD present.   Cardiovascular: Normal rate, regular rhythm, normal heart sounds and intact distal pulses. Exam reveals no gallop and no friction rub.   No murmur heard.  Pulmonary/Chest: Effort normal and breath sounds normal. No stridor. No respiratory distress. He has no wheezes. He has no rales.   2 L nasal cannula   Abdominal: Soft. Bowel sounds are normal. He exhibits no distension and no mass. There is no tenderness. There is no guarding.   Musculoskeletal: He exhibits no edema or deformity.   Neurological: He is alert.   Skin: Skin is warm and dry. He is not diaphoretic.   Nursing note and vitals  reviewed.      Significant Labs: All pertinent labs within the past 24 hours have been reviewed.    Significant Imaging: I have reviewed and interpreted all pertinent imaging results/findings within the past 24 hours.

## 2019-10-29 NOTE — PROGRESS NOTES
[facility]  Cardiology  Progress Note    Patient Name: Abdoul Ochoa  MRN: [id number]  Admission Date: 10/27/2019  Hospital Length of Stay: 2 days  Code Status: Full Code   Attending Physician: Kendra Gaffney MD   Primary Care Physician: Primary Doctor No  Expected Discharge Date:   Principal Problem:NSTEMI (non-ST elevated myocardial infarction)    Subjective:     Hospital Course:   10/27/19: adm with CP and htn urg with elev trop ?NSTEMI vs demand.  Also reported LOC 3 days ago, Biotronik ICD interrogation without sustained arrhythmia.    Interval Hx: pt seen in ICU.  Still with occ respirophasic CP, improved with control of BP.  No SOB.  Occ cough.  No palps/LH.  Case d/w Dr. Ramirez yesterday.  Will plan MPI today to assess for severe ischemia.  RCA  noted.    Tele: SR 60s, NSVT 3-5 beats (personally reviewed and interpreted)      Past Medical History:   Diagnosis Date    Alcoholic intoxication without complication     Anemia in stage 3 chronic kidney disease 1/18/2018    CKD stage 3 7/31/2019    Coronary artery disease     Coronary artery disease of native artery of native heart with stable angina pectoris 1/18/2018    Essential hypertension 1/17/2018    History of CVA (cerebrovascular accident) 1/18/2018    Ischemic cardiomyopathy 7/27/2019    MI (myocardial infarction)     2013, 2015, 2016, 2018 (total of 4 stents), triple CABG January 2018    Mixed hyperlipidemia 1/17/2018    Nonsustained ventricular tachycardia 7/31/2019    Pneumonia of both lungs due to infectious organism        Past Surgical History:   Procedure Laterality Date    BYPASS GRAFT      CARDIAC SURGERY  01/2018    three vessel CABG    CORONARY ANGIOGRAPHY INCLUDING BYPASS GRAFTS WITH CATHETERIZATION OF LEFT HEART N/A 7/26/2019    Procedure: ANGIOGRAM, CORONARY, INCLUDING BYPASS GRAFT, WITH LEFT HEART CATHETERIZATION;  Surgeon: Chadwick Ramirez MD;  Location: [facility] CATH LAB;  Service: Cardiology;  Laterality:  "N/A;  rt groin, 5 fr, visipaque    CORONARY ANGIOPLASTY WITH STENT PLACEMENT      4 stents    TRACHEOSTOMY CLOSURE         Review of patient's allergies indicates:  No Known Allergies    No current facility-administered medications on file prior to encounter.      Current Outpatient Medications on File Prior to Encounter   Medication Sig    acetaminophen (TYLENOL) 500 MG tablet Take 1 tablet (500 mg total) by mouth every 6 (six) hours as needed.    amiodarone (PACERONE) 400 MG tablet Take 1 tablet (400 mg total) by mouth 2 (two) times daily. For two weeks then decrease to once daily on     aspirin 81 MG Chew Take 81 mg by mouth once daily.    atorvastatin (LIPITOR) 80 MG tablet Take 1 tablet (80 mg total) by mouth once daily.    furosemide (LASIX) 40 MG tablet Take 1 tablet (40 mg total) by mouth 2 (two) times daily.    isosorbide mononitrate (IMDUR) 120 MG 24 hr tablet Take 1 tablet (120 mg total) by mouth once daily.    nitroGLYCERIN (NITROSTAT) 0.4 MG SL tablet Place 1 tablet (0.4 mg total) under the tongue every 5 (five) minutes as needed for Chest pain.    spironolactone (ALDACTONE) 25 MG tablet Take 1 tablet (25 mg total) by mouth once daily.    carvedilol (COREG) 25 MG tablet Take 1 tablet (25 mg total) by mouth 2 (two) times daily.    clopidogrel (PLAVIX) 75 mg tablet Take 1 tablet (75 mg total) by mouth once daily.    lisinopril (PRINIVIL,ZESTRIL) 40 MG tablet Take 1 tablet (40 mg total) by mouth once daily.     Family History     Problem Relation (Age of Onset)    Heart disease Mother, Father    Hypertension Mother, Father        Tobacco Use    Smoking status: Former Smoker     Types: Cigarettes     Last attempt to quit: 2014     Years since quittin.4    Smokeless tobacco: Never Used   Substance and Sexual Activity    Alcohol use: Not Currently     Frequency: Never     Comment: beer on the weekends "barely"    Drug use: No    Sexual activity: Yes     Partners: Female "     Review of Systems   Gastrointestinal: Negative for melena.   Genitourinary: Negative for hematuria.     Objective:     Vital Signs (Most Recent):  Temp: 97.4 °F (36.3 °C) (10/28/19 2000)  Pulse: 63 (10/29/19 0400)  Resp: (!) 30 (10/29/19 0400)  BP: 138/84 (10/29/19 0400)  SpO2: 98 % (10/29/19 0400) Vital Signs (24h Range):  Temp:  [97.4 °F (36.3 °C)-98.1 °F (36.7 °C)] 97.4 °F (36.3 °C)  Pulse:  [55-82] 63  Resp:  [11-30] 30  SpO2:  [95 %-100 %] 98 %  BP: ()/() 138/84     Weight: 74.9 kg (165 lb 2 oz)  Body mass index is 26.65 kg/m².    SpO2: 98 %  O2 Device (Oxygen Therapy): nasal cannula      Intake/Output Summary (Last 24 hours) at 10/29/2019 0501  Last data filed at 10/29/2019 0000  Gross per 24 hour   Intake 228.72 ml   Output 200 ml   Net 28.72 ml       Lines/Drains/Airways     Peripheral Intravenous Line                 Peripheral IV - Single Lumen 10/27/19 1628 18 G Left Forearm 1 day         Peripheral IV - Single Lumen 10/27/19 1755 20 G Right Forearm 1 day         Peripheral IV - Single Lumen 10/27/19 2031 20 G Left Antecubital 1 day              Exam unchanged vs 10/28/19  Physical Exam   Constitutional: He is oriented to person, place, and time. He appears well-developed and well-nourished.   HENT:   Head: Normocephalic and atraumatic.   Eyes: Pupils are equal, round, and reactive to light. Conjunctivae and EOM are normal. No scleral icterus.   Neck: Normal range of motion. Neck supple. No JVD present. No tracheal deviation present. No thyromegaly present.   Cardiovascular: Normal rate, regular rhythm, S1 normal and S2 normal. Exam reveals no gallop and no friction rub.   No murmur heard.  L ICD subclavicular incision well healed   Pulmonary/Chest: Effort normal and breath sounds normal. No respiratory distress. He has no wheezes. He has no rales. He exhibits no tenderness.   Abdominal: Soft. He exhibits no distension.   Musculoskeletal: Normal range of motion. He exhibits no edema.    Neurological: He is alert and oriented to person, place, and time. He has normal strength. No cranial nerve deficit.   Skin: Skin is warm and dry. No rash noted.   Psychiatric: He has a normal mood and affect. His behavior is normal.       Current Medications:   amiodarone  400 mg Oral Daily    aspirin  81 mg Oral Daily    atorvastatin  80 mg Oral Daily    benzonatate  200 mg Oral TID    carvedilol  25 mg Oral BID    cefTRIAXone (ROCEPHIN) IVPB  1 g Intravenous Q24H    clopidogrel  75 mg Oral Daily    dextromethorphan-guaifenesin  mg/5 ml  10 mL Oral Q6H    furosemide  40 mg Intravenous BID    heparin (PORCINE)  57.4 Units/kg (Adjusted) Intravenous Once    isosorbide mononitrate  120 mg Oral Daily    lisinopril  40 mg Oral Daily    spironolactone  25 mg Oral Daily      heparin (porcine) in D5W 16.069 Units/kg/hr (10/29/19 0000)    nitroGLYCERIN Stopped (10/28/19 1402)     acetaminophen, heparin (PORCINE), heparin (PORCINE), morphine, nitroGLYCERIN, ondansetron, sodium chloride 0.9%    Laboratory (all labs reviewed):  CBC:  Recent Labs   Lab 08/04/19  0330 08/05/19  0632 08/06/19  0319 10/27/19  1757 10/28/19  0234   WBC 5.23 5.10  5.10 6.53 4.52 4.32   Hemoglobin 11.4 L 11.3 L  11.3 L 11.3 L 11.2 L 10.3 L   Hematocrit 37.2 L 35.5 L  35.5 L 35.0 L 36.5 L 33.7 L   Platelets 307 299  299 313 270 263       CHEMISTRIES:  Recent Labs   Lab 07/31/19  0406 08/03/19  0820 08/04/19  0330 08/05/19  0632 08/06/19  0319 10/27/19  1757   Glucose 96 86 103 90  90 113 H 89   Sodium 138 134 L 139 138  138 135 L 139   Potassium 3.8 4.5 4.8 4.5  4.5 3.8 3.7   BUN, Bld 23 H 25 H 27 H 25 H  25 H 23 H 19   Creatinine 1.5 H 1.8 H 2.1 H 1.9 H  1.9 H 1.9 H 1.7 H   eGFR if  59 A 47.6 A 39.5 A 44.6 A  44.6 A 44.6 A 51 A   eGFR if non  51 A 41.2 A 34.2 A 38.6 A  38.6 A 38.6 A 44 A   Calcium 8.9 9.2 9.6 9.3  9.3 8.7 9.4   Magnesium 2.0 2.0 2.0 2.1 2.1  --        CARDIAC  BIOMARKERS:  Recent Labs   Lab 01/18/18  0008  07/25/19  1319  07/31/19  1009 07/31/19  2243 08/02/19  2217 10/27/19  1757 10/27/19  2141 10/28/19  0230   CPK 84  --  235 H  235 H  --   --   --   --   --   --  71   CPK MB 2.1  --  3.2  --   --   --   --   --   --   --    Troponin I 1.291 H   < > 1.684 H   < > 1.823 H 1.743 H 1.580 H 1.167 H 1.221 H  --     < > = values in this interval not displayed.       COAGS:  Recent Labs   Lab 01/17/18  0636 08/05/19  0632 10/27/19  1757   INR 1.1 1.1 1.1       LIPIDS/LFTS:  Recent Labs   Lab 01/18/18  0254  07/25/19  1319 07/26/19  0203 07/30/19 2001 07/30/19 2232 07/31/19  0406 10/27/19  1757   Cholesterol 156  --   --   --   --  116 L  --   --    Triglycerides 81  --   --   --   --  71  --   --    HDL 38 L  --   --   --   --  11 L  --   --    LDL Cholesterol 101.8  --   --   --   --  90.8  --   --    Non-HDL Cholesterol 118  --   --   --   --  105  --   --    AST 29   < > 43 H 38 35  --  33 23   ALT 33   < > 34 36 43  --  44 26    < > = values in this interval not displayed.       BNP:  Recent Labs   Lab 01/17/18  0636 03/25/18  1752 07/30/19  2001 10/27/19  1757   BNP 1,849 H 1,039 H 3,044 H 3,317 H       TSH:        Free T4:        Diagnostic Results:  ECG (personally reviewed and interpreted tracing(s)):  10/27/19 1736 SR 78, PVCs, NSSTTW changes (lat TWI not as prov vs tracing 8/5/19)    Chest X-Ray (personally reviewed and interpreted image(s)): 10/27/19 ?RLL PNA, CMeg, L ICD (1 lead)    Echo: 10/28/19 (images personally reviewed and interpreted; c/w report 7/2019: similar LVEF, MR not noted)  · Moderately decreased left ventricular systolic function. The estimated ejection fraction is 30%  · Severe global hypokinetic wall motion. Cannot exclude septal WMA.  · Grade III (severe) left ventricular diastolic dysfunction consistent with restrictive physiology.  · Mild right ventricular enlargement.  · Mildly to moderately reduced right ventricular systolic  function.  · Mild tricuspid regurgitation.  · The estimated PA systolic pressure is 49 mm Hg  · Pulmonary hypertension present.     Cath: 7/26/19 (images personally reviewed and interpreted)  1.  Severe triple-vessel coronary artery disease  2.  Patent BARAJAS to LAD, patent SVG to diagonal 1 and patent SVG to circumflex.  No culprit stenosis noted in these grafts  3.  Mid to distal RCA .  Distal RCA fills with collaterals from the left.  Unchanged from prior.  Findings/Key Components:  LVEDP: 19 mmHg  Dominance: Right   LM:  Patent with no significant stenosis  LAD:  Diffusely disease proximal to mid.  Fills with LIMA to LAD and SVG to diagonal 1  LCx:  Native vessel diffusely disease.  Fills with SVG to OM2  RCA:  Proximal 70% stenosis.  Distal .  Fills with collaterals from the left.  Hemostasis:  RFA 5 [place] sheath.  Manual pressure held in the cath lab for hemostasis  Impression:  Severe triple-vessel coronary artery disease.  Patent LIMA to LAD, SVG to OM and SVG to diagonal.  No acute culprit lesion identified  Plan:  Aspirin 81 mg daily indefinitely  Plavix 75 mg daily for at least 1 year  Follow-up in Cardiology Clinic      Assessment and Plan:     * NSTEMI (non-ST elevated myocardial infarction)  Elev trop with atyp CP, improved with control of BP.  NTG gtt now off.  Still with respirophasic sxs.  Trop elev similar to prev with cath at that time without culprit (7/2019)  ?CP r/t pulm infil/PNA vs demand in setting of significant HTN  Agree with ASA/Plavix/heparin  Cont Imdur/ACEi/coreg  MPI today for localization of significant ischemia      Ischemic cardiomyopathy  As per NSTEMI section  Known severe LV dysfxn, pt appears euvolemic  Echo with stable LVEF 30%, MR not noted.  Cont ACEi/aldact/coreg/imdur  Change lasix to po 40mg bid    Syncope  Reported by pt 3 days prior to adm.  Biotronik ICD interrogation neg for sustained arrhythmia (NSVT/PSVT noted).  Normal device fxn.    Mixed  hyperlipidemia  Cont statin    Essential hypertension  Cont med rx, BP now controlled  Change lasix to 40mg PO bid    CKD stage 3  Creat stable  Labs pending today    ICD (implantable cardioverter-defibrillator), single, in situ  Biotronik ICD functioning normally  No sustained arrhythmia noted on interrogation 10/28/19    Tobacco abuse  Pt reports abstinence    History of cocaine use  Urine tox neg        VTE Risk Mitigation (From admission, onward)         Ordered     IP VTE HIGH RISK PATIENT  Once      10/28/19 0322     Place sequential compression device  Until discontinued      10/28/19 0322     heparin 25,000 units in dextrose 5% (100 units/ml) IV bolus from bag INITIAL BOLUS (max bolus 4000 units)  Once      10/27/19 1926     heparin 25,000 units in dextrose 5% 250 mL (100 units/mL) infusion LOW INTENSITY nomogram - [facility]  Continuous      10/27/19 1926     heparin 25,000 units in dextrose 5% (100 units/ml) IV bolus from bag - ADDITIONAL PRN BOLUS - 60 units/kg (max bolus 4000 units)  As needed (PRN)      10/27/19 1926     heparin 25,000 units in dextrose 5% (100 units/ml) IV bolus from bag - ADDITIONAL PRN BOLUS - 30 units/kg (max bolus 4000 units)  As needed (PRN)      10/27/19 1926              Pt seen in ICU, OK for transfer to [facility].    Aftab Lemus MD  Cardiology  [facility]

## 2019-10-29 NOTE — ASSESSMENT & PLAN NOTE
Elev trop with atyp CP, improved with control of BP.  NTG gtt now off.  Still with respirophasic sxs.  Trop elev similar to prev with cath at that time without culprit (7/2019)  ?CP r/t pulm infil/PNA vs demand in setting of significant HTN  Agree with ASA/Plavix/heparin  Cont Imdur/ACEi/coreg  MPI today for localization of significant ischemia

## 2019-10-29 NOTE — NURSING
Pt arrived to room 338B from ICU by wheelchair with transport personnel. Pt has heparin infusing at this time and ICU nurse is not here to perform a heparin handoff.    1519 - ICU called and on hold to talk to JUAN Bennett.

## 2019-10-29 NOTE — CARE UPDATE
[facility]  ICU Multidisciplinary Bedside Rounds     UPDATE     Date: 10/29/2019      Plan of care reviewed with the following, Nurse, Charge Nurse, Physician and Resp. Therapist.       Needs/ Goals for the day: have stress test done. Increase activity tolerance      Level of Care: OK to Transfer

## 2019-10-29 NOTE — ASSESSMENT & PLAN NOTE
As per NSTEMI section  Known severe LV dysfxn, pt appears euvolemic  Echo with stable LVEF 30%, MR not noted.  Cont ACEi/aldact/coreg/imdur  Change lasix to po 40mg bid

## 2019-10-30 PROBLEM — E86.0 DEHYDRATION: Status: ACTIVE | Noted: 2019-01-01

## 2019-10-30 NOTE — NURSING
Patient bedside report has been completed and given to JUAN Colbert. Chart check has been completed. NAD noted. Pt is AAO and still c/o chest pain and headache. He is notified that he will be NPO after midnight incase Dr. Lemus wants to perform left heart cath in the AM.

## 2019-10-30 NOTE — PROGRESS NOTES
Bedside report given to oncoming nurse ABDULLAHI Rothman noted. Pt lying in bed, Respirations even and unlabored. Safety maintained, Call light within reach.     24 hr chart check completed.

## 2019-10-30 NOTE — PHYSICIAN QUERY
"PT Name: Abdoul Ochoa  MR #: [id number]    Physician Query Form - Heart  Condition Clarification     CDS/: Melyssa Talbot               Contact information: [email or web address]    This form is a permanent document in the medical record.     Query Date: October 30, 2019    By submitting this query, we are merely seeking further clarification of documentation. Please utilize your independent clinical judgment when addressing the question(s) below.    The medical record contains the following   Indicators     Supporting Clinical Findings Location in Medical Record   x BNP 3317 Labs 10/27    x EF · The estimated ejection fraction is 30%   Echo 10/28   x Radiology findings Impression      Airspace opacity in the right lower lobe, concerning for pneumonia.    Cardiomegaly without evidence of CHF.     CXR 10/27    x Echo Results · Moderately decreased left ventricular systolic function. The estimated ejection fraction is 30%  · Severe global hypokinetic wall motion. Cannot exclude septal WMA.  · Grade III (severe) left ventricular diastolic dysfunction consistent with restrictive physiology.  · Mild right ventricular enlargement.  · Mildly to moderately reduced right ventricular systolic function.  · Mild tricuspid regurgitation.  · The estimated PA systolic pressure is 49 mm Hg  · Pulmonary hypertension present. Echo 10/28     "Ascites" documented      "SOB" or "OLIVO" documented      "Hypoxia" documented     x Heart Failure documented Findings consistent with non ST elevation MI as well as acute CHF exacerbation    Chronic combined systolic and diastolic heart failure  Presented with angina  BNP elevated at >3300 but appears euvolemic on exam  Repeat TTE 10/28 shows EF 30% and grade 3 diastolic dysfunction with severe global hypokinesis  Continue carvedilol, lisinopril, spironolactone  Continue oral Lasix for volume control  Cardiology following.  Stress test today.  In prior admission, patient had episodes of wide " "complex tachycardia-he is on amiodarone for this H&P      HM note 10/29    x "Edema" documented There is evidence of markedly elevated BNP 3300, JVD, rales, and peripheral edema H&P   x Diuretics/Meds furosemide tablet 40 mg   Dose: 40 mg  Freq: 2 times daily Route: Oral  Start: 10/29/19 0900    furosemide injection 40 mg   Dose: 40 mg  Freq: ED 1 Time Route: IV  Start: 10/27/19 1745 End: 10/27/19 1757    furosemide injection 40 mg   Dose: 40 mg  Freq: 2 times daily Route: IV  Start: 10/28/19 0900 End: 10/29/19 0509 MAR           MAR           MAR    Treatment:      Other:      Heart failure (HF) can be acute, chronic or both. It is generally further specificed as systolic, diastolic, or combined. Lastly, it is important to identify an underlying etiology if known or suspected.     Common clues to acute exacerbation:  Rapidly progressive symptoms (w/in 2 weeks of presentation), using IV diuretics to treat, using supplemental O2, pulmonary edema on Xray, MI w/in 4 weeks, and/or BNP >500    Systolic Heart Failure: is defined as chart documentation of a left ventricular ejection fraction (LVEF) less than 40%     Diastolic Heart Failure: is defined as a left ventricular ejection fraction (LVEF) greater than 40%   +      Evidence of diastolic dysfunction on echocardiography OR    Right heart catheterization wedge pressure above 12 mm Hg OR    Left heart catheterization left ventricular end diastolic pressure 18 mm Hg or above.    References: *American Heart Association    The clinical guidelines noted below are only system guidelines, and do not replace the providers clinical judgment.     Provider, please specify the diagnosis associated with above clinical findings    Please specify the ACUITY of the documented heart failure:   [ x  ] Acute on Chronic Combined Systolic and Diastolic Heart Failure      [   ] Chronic Combined Systolic and Diastolic Heart Failure   [   ] Other Type of Heart Failure (please specify " type):   [   ] Other (please specify):   [  ] Clinically Undetermined                           Please document in your progress notes daily for the duration of treatment until resolved and include in your discharge summary.

## 2019-10-30 NOTE — ASSESSMENT & PLAN NOTE
As per NSTEMI section  Known severe LV dysfxn, pt appears euvolemic  Echo with stable LVEF 30%, MR not noted.  Cont ACEi/aldact/coreg/imdur

## 2019-10-30 NOTE — NURSING
Pt c/o cp 7/10 given nitro x1 per MD order. Pt still c/o cp states he doesn't want another nitro, he wants to go home. Dr. Lemus notified. New orders in place for pt d/c. NAD noted. Will continue to monitor.

## 2019-10-30 NOTE — ASSESSMENT & PLAN NOTE
CP abated.  Trop elev similar to prev with cath at that time without culprit (7/2019)  MPI 10/29/18 with dense inferior scar, no appreciable ischemia.  Cont ASA/Plavix  Cont Imdur/ACEi/coreg  No further inpat cardiac testing planned  Stop heparin gtt and ambulat pt with plan for discharge this PM.  Next antianginal: Ranexa

## 2019-10-30 NOTE — PLAN OF CARE
Problem: Fall Injury Risk  Goal: Absence of Fall and Fall-Related Injury  Outcome: Ongoing, Progressing  Intervention: Identify and Manage Contributors to Fall Injury Risk  Flowsheets (Taken 10/30/2019 0446)  Self-Care Promotion: independence encouraged; BADL personal objects within reach; BADL personal routines maintained  Medication Review/Management: medications reviewed  Intervention: Promote Injury-Free Environment  Flowsheets (Taken 10/30/2019 0446)  Safety Promotion/Fall Prevention: assistive device/personal item within reach; bed alarm set; instructed to call staff for mobility; side rails raised x 2; nonskid shoes/socks when out of bed; medications reviewed  Environmental Safety Modification: assistive device/personal items within reach; clutter free environment maintained; room organization consistent

## 2019-10-30 NOTE — PLAN OF CARE
"TN reviewed follow up appointment information as well as  "NSTEMI discharge instructions" handout with patient using teach back. Patient stated he will notify the doctor if he has SOB, chest pain and trouble breathing.  Patient is in agreement and verbalized an understanding. Placed discharge information in blue discharge folder.  TN also reviewed patient responsibility checklist with him using teach back. Patient was able to verbalize his responsibilities after discharge to manage his care at home being   1. Going to follow up appointments   2. Picking up rx from the pharmacy when discharged  3. Taking his medications as prescribed     Patient's nurse, Paulette , informed that patient can discharge from  standpoint.        10/30/19 1223   Final Note   Assessment Type Final Discharge Note   Anticipated Discharge Disposition Home   What phone number can be called within the next 1-3 days to see how you are doing after discharge?   ([phone])   Hospital Follow Up  Appt(s) scheduled? Yes   Discharge plans and expectations educations in teach back method with documentation complete? Yes   Right Care Referral Info   Post Acute Recommendation No Care     "

## 2019-10-30 NOTE — SUBJECTIVE & OBJECTIVE
Past Medical History:   Diagnosis Date    Alcoholic intoxication without complication     Anemia in stage 3 chronic kidney disease 1/18/2018    CKD stage 3 7/31/2019    Coronary artery disease     Coronary artery disease of native artery of native heart with stable angina pectoris 1/18/2018    Essential hypertension 1/17/2018    History of CVA (cerebrovascular accident) 1/18/2018    Ischemic cardiomyopathy 7/27/2019    MI (myocardial infarction)     2013, 2015, 2016, 2018 (total of 4 stents), triple CABG January 2018    Mixed hyperlipidemia 1/17/2018    Nonsustained ventricular tachycardia 7/31/2019    Pneumonia of both lungs due to infectious organism        Past Surgical History:   Procedure Laterality Date    BYPASS GRAFT      CARDIAC SURGERY  01/2018    three vessel CABG    CORONARY ANGIOGRAPHY INCLUDING BYPASS GRAFTS WITH CATHETERIZATION OF LEFT HEART N/A 7/26/2019    Procedure: ANGIOGRAM, CORONARY, INCLUDING BYPASS GRAFT, WITH LEFT HEART CATHETERIZATION;  Surgeon: Chadwick Ramirez MD;  Location: [facility] CATH LAB;  Service: Cardiology;  Laterality: N/A;  rt groin, 5 fr, visipaque    CORONARY ANGIOPLASTY WITH STENT PLACEMENT      4 stents    TRACHEOSTOMY CLOSURE  2014       Review of patient's allergies indicates:  No Known Allergies    No current facility-administered medications on file prior to encounter.      Current Outpatient Medications on File Prior to Encounter   Medication Sig    acetaminophen (TYLENOL) 500 MG tablet Take 1 tablet (500 mg total) by mouth every 6 (six) hours as needed.    amiodarone (PACERONE) 400 MG tablet Take 1 tablet (400 mg total) by mouth 2 (two) times daily. For two weeks then decrease to once daily on 8/19    aspirin 81 MG Chew Take 81 mg by mouth once daily.    atorvastatin (LIPITOR) 80 MG tablet Take 1 tablet (80 mg total) by mouth once daily.    furosemide (LASIX) 40 MG tablet Take 1 tablet (40 mg total) by mouth 2 (two) times daily.    isosorbide  "mononitrate (IMDUR) 120 MG 24 hr tablet Take 1 tablet (120 mg total) by mouth once daily.    nitroGLYCERIN (NITROSTAT) 0.4 MG SL tablet Place 1 tablet (0.4 mg total) under the tongue every 5 (five) minutes as needed for Chest pain.    spironolactone (ALDACTONE) 25 MG tablet Take 1 tablet (25 mg total) by mouth once daily.    carvedilol (COREG) 25 MG tablet Take 1 tablet (25 mg total) by mouth 2 (two) times daily.    clopidogrel (PLAVIX) 75 mg tablet Take 1 tablet (75 mg total) by mouth once daily.    lisinopril (PRINIVIL,ZESTRIL) 40 MG tablet Take 1 tablet (40 mg total) by mouth once daily.     Family History     Problem Relation (Age of Onset)    Heart disease Mother, Father    Hypertension Mother, Father        Tobacco Use    Smoking status: Former Smoker     Types: Cigarettes     Last attempt to quit: 2014     Years since quittin.4    Smokeless tobacco: Never Used   Substance and Sexual Activity    Alcohol use: Not Currently     Frequency: Never     Comment: beer on the weekends "barely"    Drug use: No    Sexual activity: Yes     Partners: Female     Review of Systems   Gastrointestinal: Negative for melena.   Genitourinary: Negative for hematuria.     Objective:     Vital Signs (Most Recent):  Temp: 97.5 °F (36.4 °C) (10/30/19 044)  Pulse: 60 (10/30/19 044)  Resp: 17 (10/30/19 0441)  BP: 119/84 (10/30/19 0441)  SpO2: 97 % (10/30/19 0441) Vital Signs (24h Range):  Temp:  [97.3 °F (36.3 °C)-98.2 °F (36.8 °C)] 97.5 °F (36.4 °C)  Pulse:  [59-69] 60  Resp:  [17-19] 17  SpO2:  [88 %-100 %] 97 %  BP: ()/(53-86) 119/84     Weight: 69.7 kg (153 lb 10.6 oz)  Body mass index is 24.8 kg/m².    SpO2: 97 %  O2 Device (Oxygen Therapy): nasal cannula      Intake/Output Summary (Last 24 hours) at 10/30/2019 0742  Last data filed at 10/29/2019 1900  Gross per 24 hour   Intake 315 ml   Output --   Net 315 ml       Lines/Drains/Airways     Peripheral Intravenous Line                 Peripheral IV - " Single Lumen 10/27/19 1628 18 G Left Forearm 2 days         Peripheral IV - Single Lumen 10/27/19 1755 20 G Right Forearm 2 days              Exam unchanged vs 10/29/19  Physical Exam   Constitutional: He is oriented to person, place, and time. He appears well-developed and well-nourished.   HENT:   Head: Normocephalic and atraumatic.   Eyes: Pupils are equal, round, and reactive to light. Conjunctivae and EOM are normal. No scleral icterus.   Neck: Normal range of motion. Neck supple. No JVD present. No tracheal deviation present. No thyromegaly present.   Cardiovascular: Normal rate, regular rhythm, S1 normal and S2 normal. Exam reveals no gallop and no friction rub.   No murmur heard.  L ICD subclavicular incision well healed   Pulmonary/Chest: Effort normal and breath sounds normal. No respiratory distress. He has no wheezes. He has no rales. He exhibits no tenderness.   Abdominal: Soft. He exhibits no distension.   Musculoskeletal: Normal range of motion. He exhibits no edema.   Neurological: He is alert and oriented to person, place, and time. He has normal strength. No cranial nerve deficit.   Skin: Skin is warm and dry. No rash noted.   Psychiatric: He has a normal mood and affect. His behavior is normal.       Current Medications:   amiodarone  400 mg Oral Daily    aspirin  81 mg Oral Daily    atorvastatin  80 mg Oral Daily    benzonatate  200 mg Oral TID    carvedilol  25 mg Oral BID    cefTRIAXone (ROCEPHIN) IVPB  1 g Intravenous Q24H    clopidogrel  75 mg Oral Daily    dextromethorphan-guaifenesin  mg/5 ml  10 mL Oral Q6H    furosemide  40 mg Oral BID    heparin (PORCINE)  57.4 Units/kg (Adjusted) Intravenous Once    isosorbide mononitrate  120 mg Oral Daily    lisinopril  40 mg Oral Daily    spironolactone  25 mg Oral Daily      heparin (porcine) in D5W 16 Units/kg/hr (10/29/19 1407)     acetaminophen, heparin (PORCINE), heparin (PORCINE), morphine, nitroGLYCERIN, ondansetron, sodium  chloride 0.9%    Laboratory (all labs reviewed):  CBC:  Recent Labs   Lab 08/06/19 0319 10/27/19  1757 10/28/19  0234 10/29/19  0420 10/30/19  0317   WBC 6.53 4.52 4.32 5.70 4.64   Hemoglobin 11.3 L 11.2 L 10.3 L 10.9 L 10.5 L   Hematocrit 35.0 L 36.5 L 33.7 L 35.0 L 34.3 L   Platelets 313 270 263 260 236       CHEMISTRIES:  Recent Labs   Lab 07/31/19  0406 08/03/19  0820 08/04/19  0330 08/05/19  0632 08/06/19 0319 10/27/19  1757 10/29/19  0420 10/30/19  0317   Glucose 96 86 103 90  90 113 H 89 94 90   Sodium 138 134 L 139 138  138 135 L 139 135 L 137   Potassium 3.8 4.5 4.8 4.5  4.5 3.8 3.7 4.3 3.4 L   BUN, Bld 23 H 25 H 27 H 25 H  25 H 23 H 19 20 24 H   Creatinine 1.5 H 1.8 H 2.1 H 1.9 H  1.9 H 1.9 H 1.7 H 1.7 H 1.8 H   eGFR if  59 A 47.6 A 39.5 A 44.6 A  44.6 A 44.6 A 51 A 51 A 48 A   eGFR if non  51 A 41.2 A 34.2 A 38.6 A  38.6 A 38.6 A 44 A 44 A 41 A   Calcium 8.9 9.2 9.6 9.3  9.3 8.7 9.4 9.2 9.0   Magnesium 2.0 2.0 2.0 2.1 2.1  --   --   --        CARDIAC BIOMARKERS:  Recent Labs   Lab 01/18/18  0008  07/25/19  1319  07/31/19  1009 07/31/19  2243 08/02/19  2217 10/27/19  1757 10/27/19  2141 10/28/19  0230   CPK 84  --  235 H  235 H  --   --   --   --   --   --  71   CPK MB 2.1  --  3.2  --   --   --   --   --   --   --    Troponin I 1.291 H   < > 1.684 H   < > 1.823 H 1.743 H 1.580 H 1.167 H 1.221 H  --     < > = values in this interval not displayed.       COAGS:  Recent Labs   Lab 01/17/18  0636 08/05/19  0632 10/27/19  1757   INR 1.1 1.1 1.1       LIPIDS/LFTS:  Recent Labs   Lab 01/18/18  0254  07/25/19  1319 07/26/19  0203 07/30/19 2001 07/30/19  2232 07/31/19  0406 10/27/19  1757 10/29/19  0358   Cholesterol 156  --   --   --   --  116 L  --   --  123   Triglycerides 81  --   --   --   --  71  --   --  66   HDL 38 L  --   --   --   --  11 L  --   --  22 L   LDL Cholesterol 101.8  --   --   --   --  90.8  --   --  87.8   Non-HDL Cholesterol 118  --   --   --   --   105  --   --  101   AST 29   < > 43 H 38 35  --  33 23  --    ALT 33   < > 34 36 43  --  44 26  --     < > = values in this interval not displayed.       BNP:  Recent Labs   Lab 01/17/18  0636 03/25/18  1752 07/30/19  2001 10/27/19  1757   BNP 1,849 H 1,039 H 3,044 H 3,317 H       TSH:        Free T4:        Diagnostic Results:  ECG (personally reviewed and interpreted tracing(s)):  10/27/19 1736 SR 78, PVCs, NSSTTW changes (lat TWI not as prov vs tracing 8/5/19)    Chest X-Ray (personally reviewed and interpreted image(s)): 10/27/19 ?RLL PNA, CMeg, L ICD (1 lead)    L MPI 10/29/19 (images personally reviewed and interpreted)    Abnormal Luann myocardial perfusion study.    There is a large sized, severe intensity, fixed defect in the basal to apical inferior wall(s) in the typical distribution of the RCA territory.    Gated perfusion images showed an ejection fraction of 24 % post stress.    There is severe global hypokinesis at stress.    There is basal to apical inferior wall akinesis at stress.    LV cavity size is  and moderately enlarged at stress.    Echo: 10/28/19 (images personally reviewed and interpreted; c/w report 7/2019: similar LVEF, MR not noted)  · Moderately decreased left ventricular systolic function. The estimated ejection fraction is 30%  · Severe global hypokinetic wall motion. Cannot exclude septal WMA.  · Grade III (severe) left ventricular diastolic dysfunction consistent with restrictive physiology.  · Mild right ventricular enlargement.  · Mildly to moderately reduced right ventricular systolic function.  · Mild tricuspid regurgitation.  · The estimated PA systolic pressure is 49 mm Hg  · Pulmonary hypertension present.     Cath: 7/26/19 (images personally reviewed and interpreted)  1.  Severe triple-vessel coronary artery disease  2.  Patent BARAJAS to LAD, patent SVG to diagonal 1 and patent SVG to circumflex.  No culprit stenosis noted in these grafts  3.  Mid to distal RCA .   Distal RCA fills with collaterals from the left.  Unchanged from prior.  Findings/Key Components:  LVEDP: 19 mmHg  Dominance: Right   LM:  Patent with no significant stenosis  LAD:  Diffusely disease proximal to mid.  Fills with LIMA to LAD and SVG to diagonal 1  LCx:  Native vessel diffusely disease.  Fills with SVG to OM2  RCA:  Proximal 70% stenosis.  Distal .  Fills with collaterals from the left.  Hemostasis:  RFA 5 Scottish sheath.  Manual pressure held in the cath lab for hemostasis  Impression:  Severe triple-vessel coronary artery disease.  Patent LIMA to LAD, SVG to OM and SVG to diagonal.  No acute culprit lesion identified  Plan:  Aspirin 81 mg daily indefinitely  Plavix 75 mg daily for at least 1 year  Follow-up in Cardiology Clinic

## 2019-10-30 NOTE — DISCHARGE SUMMARY
[facility] Hospital Medicine  Discharge Summary      Patient Name: Abdoul Ochoa  MRN: [id number]  Admission Date: 10/27/2019  Hospital Length of Stay: 3 days  Discharge Date and Time:  10/30/2019 9:58 AM  Attending Physician: Kendra Gaffney MD   Discharging Provider: Kendra Gaffney MD  Primary Care Provider: Primary Doctor No      HPI:   Abdoul Ochoa is a 54 y.o. male that (in part)  has a past medical history of Alcoholic intoxication without complication, Anemia in stage 3 chronic kidney disease, CKD stage 3, Coronary artery disease, Coronary artery disease of native artery of native heart with stable angina pectoris, Essential hypertension, History of CVA (cerebrovascular accident), Ischemic cardiomyopathy, MI (myocardial infarction), Mixed hyperlipidemia, Nonsustained ventricular tachycardia, and Pneumonia of both lungs due to infectious organism.  has a past surgical history that includes Bypass Graft; Tracheostomy closure (2014); Cardiac surgery (01/2018); Coronary angioplasty with stent; and Coronary angiography including bypass grafts with catheterization of left heart (N/A, 7/26/2019). Presents to [facility] Emergency Department complaining of chest pain. Two days duration.  Initially patient took 1 nitroglycerin at home, 1 with EMS, and 2 additional nitroglycerins while in the ED.  Minimal relief from that.  Associated with nausea and diaphoresis.  Complained of cough and congestion with subjective fever.  Denies syncope, cyanosis, or palpitations.  Positive for peripheral edema, paroxysmal nocturnal dyspnea, and dyspnea with exertion.    In the emergency department routine laboratory studies, chest x-ray, EKG, cardiac enzymes were obtained.  There is evidence of markedly elevated BNP 3300, JVD, rales, and peripheral edema. Findings consistent with non ST elevation MI as well as acute CHF exacerbation.  He had a 2D echo performed in July with an EF of 30%.   Yes T-wave inversions in his lateral leads but no evidence of acute ischemia on EKG.  Chest x-ray concerning for possibility of pneumonia.  Given empiric antibiotics pending further culture data.  Will repeat chest x-ray with PA and lateral.    Hospital medicine has been asked to admit for further evaluation and treatment.     * No surgery found *      Hospital Course:   Admitted to ICU with NSTEMI. Medically treated with asa, plavix, heparin gtt, carvedilol, lisinopril, atorvastatin. Required nitro gtt for angina. Cardiology consulted. Repeat TTE 10/28 EF 30%, G3DD, severe global hypokinesis. Stress test 10/29 did not show any active ischemia but did confirm areas of infarct. Patient had some atypical chest pain (sharp pain L chest) but no typical angina after his stress test. No chest pain 10/30. Per Cardiology, another angiogram would not benefit him. He is medically stable for discharge. He will follow up in Cardiology clinic. He is on max carvedilol and isosorbide mononitrate for chest pain control. Next medication to add if continued pain is ranexa.      Consults:   Consults (From admission, onward)        Status Ordering Provider     Inpatient consult to Cardiology  Once     Provider:  Arnoldo Lemus MD    Completed ARNOLDO BALLARD          No new Assessment & Plan notes have been filed under this hospital service since the last note was generated.  Service: Hospital Medicine    Final Active Diagnoses:    Diagnosis Date Noted POA    PRINCIPAL PROBLEM:  NSTEMI (non-ST elevated myocardial infarction) [I21.4] 10/27/2019 Yes    History of cocaine use [Z87.898] 10/28/2019 Yes    Syncope [R55] 10/28/2019 Yes    ICD (implantable cardioverter-defibrillator), single, in situ [Z95.810] 10/28/2019 Yes    CKD stage 3 [N18.3] 07/31/2019 Yes     Chronic    Ischemic cardiomyopathy [I25.5] 07/27/2019 Yes     Chronic    History of CVA (cerebrovascular accident) [Z86.73] 01/18/2018 Not Applicable     Chronic     Anemia in stage 3 chronic kidney disease [N18.3, D63.1] 01/18/2018 Yes    Coronary artery disease involving native coronary artery of native heart [I25.10] 01/18/2018 Yes    Essential hypertension [I10] 01/17/2018 Yes     Chronic    Mixed hyperlipidemia [E78.2] 01/17/2018 Yes     Chronic    Tobacco abuse [Z72.0] 01/17/2018 Yes     Chronic    Chronic combined systolic and diastolic heart failure [I50.42] 01/17/2018 Yes      Problems Resolved During this Admission:       Discharged Condition: good    Disposition: Home or Self Care    Follow Up:  Follow-up Information     [facility].    Why:  Please call to schedule your PCP appt in 1 week   Contact information:  [address] [place] [id number] [phone]                 Patient Instructions:      Diet Cardiac     Notify your health care provider if you experience any of the following:  temperature >100.4     Notify your health care provider if you experience any of the following:  persistent nausea and vomiting or diarrhea     Notify your health care provider if you experience any of the following:  severe uncontrolled pain     Notify your health care provider if you experience any of the following:  redness, tenderness, or signs of infection (pain, swelling, redness, odor or green/yellow discharge around incision site)     Notify your health care provider if you experience any of the following:  difficulty breathing or increased cough     Notify your health care provider if you experience any of the following:  severe persistent headache     Notify your health care provider if you experience any of the following:  worsening rash     Notify your health care provider if you experience any of the following:  persistent dizziness, light-headedness, or visual disturbances     Notify your health care provider if you experience any of the following:  increased confusion or weakness     Activity as tolerated       Significant Diagnostic  Studies: Labs: All labs within the past 24 hours have been reviewed    Pending Diagnostic Studies:     Procedure Component Value Units Date/Time    Ferritin [[id number]] Collected:  10/30/19 0317    Order Status:  Sent Lab Status:  In process Updated:  10/30/19 0420    Specimen:  Blood          Medications:  Reconciled Home Medications:      Medication List      CHANGE how you take these medications    amiodarone 400 MG tablet  Commonly known as:  PACERONE  Take 1 tablet (400 mg total) by mouth once daily.  Start taking on:  October 31, 2019  What changed:    · when to take this  · additional instructions        CONTINUE taking these medications    aspirin 81 MG Chew  Take 81 mg by mouth once daily.     atorvastatin 80 MG tablet  Commonly known as:  LIPITOR  Take 1 tablet (80 mg total) by mouth once daily.     carvedilol 25 MG tablet  Commonly known as:  COREG  Take 1 tablet (25 mg total) by mouth 2 (two) times daily.     clopidogrel 75 mg tablet  Commonly known as:  PLAVIX  Take 1 tablet (75 mg total) by mouth once daily.     furosemide 40 MG tablet  Commonly known as:  LASIX  Take 1 tablet (40 mg total) by mouth 2 (two) times daily.     isosorbide mononitrate 120 MG 24 hr tablet  Commonly known as:  IMDUR  Take 1 tablet (120 mg total) by mouth once daily.     lisinopril 40 MG tablet  Commonly known as:  PRINIVIL,ZESTRIL  Take 1 tablet (40 mg total) by mouth once daily.     nitroGLYCERIN 0.4 MG SL tablet  Commonly known as:  NITROSTAT  Place 1 tablet (0.4 mg total) under the tongue every 5 (five) minutes as needed for Chest pain.     spironolactone 25 MG tablet  Commonly known as:  ALDACTONE  Take 1 tablet (25 mg total) by mouth once daily.        STOP taking these medications    acetaminophen 500 MG tablet  Commonly known as:  TYLENOL            Indwelling Lines/Drains at time of discharge:   Lines/Drains/Airways     None                 Time spent on the discharge of patient: 35 minutes  Patient was seen and  examined on the date of discharge and determined to be suitable for discharge.         Kendra Gaffney MD  Department of Hospital Medicine  [facility]

## 2019-10-30 NOTE — NURSING
Pt discharge teaching was implemented along with paperwork. D/c both IV sites, catheter intact. Pt AAOx4 VS were stable. Pt verbalizes understanding and no questions were asked. Pt requested cab for d/c stated Medicaid will pay. Spoke with charge nurse about transportation she stated that pt would have to call Medicaid directly. Explained this to pt he stated he will walk home instead. Charge nurse made aware. Pt ambulated off unit, NAD noted.

## 2019-10-30 NOTE — PROGRESS NOTES
[facility] CASE MANAGEMENT                  WRITTEN DISCHARGE INFORMATION      APPOINTMENTS AND RESOURCES TO HELP YOU MANAGE YOUR CARE AT HOME BASED ON YOUR PREFERENCES:  (If an appointment is not scheduled for you when you leave the hospital, call your doctor to schedule a follow up visit within a week)    Follow-up Information     [facility] [place].    Why:  Please call to schedule your PCP appt in 1 week   Contact information:  [address]  [place] [address]  [phone]             [facility] In 2 weeks.    Why:  Please call to schedule your Cardiology appt in 2 weeks  Contact information:  [address] [facility] [address]  [phone]                     Healthy Living Instructions to HELP MANAGE YOUR CARE AT HOME:  Things You are responsible for:  1.    Getting your prescriptions filled   2.    Taking your medications as directed, DO NOT MISS ANY DOSES!  3.    Following the diet and exercise recommended by your doctor  4.    Going to your follow-up doctor appointment. This is important because it allows the doctor to monitor your progress and determine if any changes need to made to your treatment plan.  5. If you have any questions about MANAGING YOUR CARE AT HOME Call the Nurse Care Line for 24/7 Assistance [phone]       Please answer any calls you may receive from [facility]. We want to continue to support you as you manage your healthcare needs. [facility] is happy to have the opportunity to serve you.      Thank you for choosing [facility] for your healthcare needs!  Your [facility] Case Management Team,

## 2019-10-30 NOTE — PROGRESS NOTES
[facility]  Cardiology  Progress Note    Patient Name: Abdoul Ochoa  MRN: [id number]  Admission Date: 10/27/2019  Hospital Length of Stay: 3 days  Code Status: Full Code   Attending Physician: Kendra Gaffney MD   Primary Care Physician: Primary Doctor No  Expected Discharge Date:   Principal Problem:NSTEMI (non-ST elevated myocardial infarction)    Subjective:     Hospital Course:   10/27/19: adm with CP and htn urg with elev trop ?NSTEMI vs demand.  Also reported LOC 3 days ago, Biotronik ICD interrogation without sustained arrhythmia.  10/29/19: MPI with dense inferior scar, no ischemia.  RCA  (in-stent) noted on prior cath.    Interval Hx: waxing and waning atyp CP, none at present.  No sob/palps.  Discussed nuc results.    Tele: SR 60s, NSVT, AIVR (personally reviewed and interpreted)      Past Medical History:   Diagnosis Date    Alcoholic intoxication without complication     Anemia in stage 3 chronic kidney disease 1/18/2018    CKD stage 3 7/31/2019    Coronary artery disease     Coronary artery disease of native artery of native heart with stable angina pectoris 1/18/2018    Essential hypertension 1/17/2018    History of CVA (cerebrovascular accident) 1/18/2018    Ischemic cardiomyopathy 7/27/2019    MI (myocardial infarction)     2013, 2015, 2016, 2018 (total of 4 stents), triple CABG January 2018    Mixed hyperlipidemia 1/17/2018    Nonsustained ventricular tachycardia 7/31/2019    Pneumonia of both lungs due to infectious organism        Past Surgical History:   Procedure Laterality Date    BYPASS GRAFT      CARDIAC SURGERY  01/2018    three vessel CABG    CORONARY ANGIOGRAPHY INCLUDING BYPASS GRAFTS WITH CATHETERIZATION OF LEFT HEART N/A 7/26/2019    Procedure: ANGIOGRAM, CORONARY, INCLUDING BYPASS GRAFT, WITH LEFT HEART CATHETERIZATION;  Surgeon: Chadwick Ramirez MD;  Location: [facility] CATH LAB;  Service: Cardiology;  Laterality: N/A;  rt groin, 5 fr, visipaque     "CORONARY ANGIOPLASTY WITH STENT PLACEMENT      4 stents    TRACHEOSTOMY CLOSURE         Review of patient's allergies indicates:  No Known Allergies    No current facility-administered medications on file prior to encounter.      Current Outpatient Medications on File Prior to Encounter   Medication Sig    acetaminophen (TYLENOL) 500 MG tablet Take 1 tablet (500 mg total) by mouth every 6 (six) hours as needed.    amiodarone (PACERONE) 400 MG tablet Take 1 tablet (400 mg total) by mouth 2 (two) times daily. For two weeks then decrease to once daily on     aspirin 81 MG Chew Take 81 mg by mouth once daily.    atorvastatin (LIPITOR) 80 MG tablet Take 1 tablet (80 mg total) by mouth once daily.    furosemide (LASIX) 40 MG tablet Take 1 tablet (40 mg total) by mouth 2 (two) times daily.    isosorbide mononitrate (IMDUR) 120 MG 24 hr tablet Take 1 tablet (120 mg total) by mouth once daily.    nitroGLYCERIN (NITROSTAT) 0.4 MG SL tablet Place 1 tablet (0.4 mg total) under the tongue every 5 (five) minutes as needed for Chest pain.    spironolactone (ALDACTONE) 25 MG tablet Take 1 tablet (25 mg total) by mouth once daily.    carvedilol (COREG) 25 MG tablet Take 1 tablet (25 mg total) by mouth 2 (two) times daily.    clopidogrel (PLAVIX) 75 mg tablet Take 1 tablet (75 mg total) by mouth once daily.    lisinopril (PRINIVIL,ZESTRIL) 40 MG tablet Take 1 tablet (40 mg total) by mouth once daily.     Family History     Problem Relation (Age of Onset)    Heart disease Mother, Father    Hypertension Mother, Father        Tobacco Use    Smoking status: Former Smoker     Types: Cigarettes     Last attempt to quit: 2014     Years since quittin.4    Smokeless tobacco: Never Used   Substance and Sexual Activity    Alcohol use: Not Currently     Frequency: Never     Comment: beer on the weekends "barely"    Drug use: No    Sexual activity: Yes     Partners: Female     Review of Systems "   Gastrointestinal: Negative for melena.   Genitourinary: Negative for hematuria.     Objective:     Vital Signs (Most Recent):  Temp: 97.5 °F (36.4 °C) (10/30/19 0441)  Pulse: 60 (10/30/19 0441)  Resp: 17 (10/30/19 0441)  BP: 119/84 (10/30/19 0441)  SpO2: 97 % (10/30/19 0441) Vital Signs (24h Range):  Temp:  [97.3 °F (36.3 °C)-98.2 °F (36.8 °C)] 97.5 °F (36.4 °C)  Pulse:  [59-69] 60  Resp:  [17-19] 17  SpO2:  [88 %-100 %] 97 %  BP: ()/(53-86) 119/84     Weight: 69.7 kg (153 lb 10.6 oz)  Body mass index is 24.8 kg/m².    SpO2: 97 %  O2 Device (Oxygen Therapy): nasal cannula      Intake/Output Summary (Last 24 hours) at 10/30/2019 0742  Last data filed at 10/29/2019 1900  Gross per 24 hour   Intake 315 ml   Output --   Net 315 ml       Lines/Drains/Airways     Peripheral Intravenous Line                 Peripheral IV - Single Lumen 10/27/19 1628 18 G Left Forearm 2 days         Peripheral IV - Single Lumen 10/27/19 1755 20 G Right Forearm 2 days              Exam unchanged vs 10/29/19  Physical Exam   Constitutional: He is oriented to person, place, and time. He appears well-developed and well-nourished.   HENT:   Head: Normocephalic and atraumatic.   Eyes: Pupils are equal, round, and reactive to light. Conjunctivae and EOM are normal. No scleral icterus.   Neck: Normal range of motion. Neck supple. No JVD present. No tracheal deviation present. No thyromegaly present.   Cardiovascular: Normal rate, regular rhythm, S1 normal and S2 normal. Exam reveals no gallop and no friction rub.   No murmur heard.  L ICD subclavicular incision well healed   Pulmonary/Chest: Effort normal and breath sounds normal. No respiratory distress. He has no wheezes. He has no rales. He exhibits no tenderness.   Abdominal: Soft. He exhibits no distension.   Musculoskeletal: Normal range of motion. He exhibits no edema.   Neurological: He is alert and oriented to person, place, and time. He has normal strength. No cranial nerve  deficit.   Skin: Skin is warm and dry. No rash noted.   Psychiatric: He has a normal mood and affect. His behavior is normal.       Current Medications:   amiodarone  400 mg Oral Daily    aspirin  81 mg Oral Daily    atorvastatin  80 mg Oral Daily    benzonatate  200 mg Oral TID    carvedilol  25 mg Oral BID    cefTRIAXone (ROCEPHIN) IVPB  1 g Intravenous Q24H    clopidogrel  75 mg Oral Daily    dextromethorphan-guaifenesin  mg/5 ml  10 mL Oral Q6H    furosemide  40 mg Oral BID    heparin (PORCINE)  57.4 Units/kg (Adjusted) Intravenous Once    isosorbide mononitrate  120 mg Oral Daily    lisinopril  40 mg Oral Daily    spironolactone  25 mg Oral Daily      heparin (porcine) in D5W 16 Units/kg/hr (10/29/19 1407)     acetaminophen, heparin (PORCINE), heparin (PORCINE), morphine, nitroGLYCERIN, ondansetron, sodium chloride 0.9%    Laboratory (all labs reviewed):  CBC:  Recent Labs   Lab 08/06/19  0319 10/27/19  1757 10/28/19  0234 10/29/19  0420 10/30/19  0317   WBC 6.53 4.52 4.32 5.70 4.64   Hemoglobin 11.3 L 11.2 L 10.3 L 10.9 L 10.5 L   Hematocrit 35.0 L 36.5 L 33.7 L 35.0 L 34.3 L   Platelets 313 270 263 260 236       CHEMISTRIES:  Recent Labs   Lab 07/31/19  0406 08/03/19  0820 08/04/19  0330 08/05/19  0632 08/06/19  0319 10/27/19  1757 10/29/19  0420 10/30/19  0317   Glucose 96 86 103 90  90 113 H 89 94 90   Sodium 138 134 L 139 138  138 135 L 139 135 L 137   Potassium 3.8 4.5 4.8 4.5  4.5 3.8 3.7 4.3 3.4 L   BUN, Bld 23 H 25 H 27 H 25 H  25 H 23 H 19 20 24 H   Creatinine 1.5 H 1.8 H 2.1 H 1.9 H  1.9 H 1.9 H 1.7 H 1.7 H 1.8 H   eGFR if  59 A 47.6 A 39.5 A 44.6 A  44.6 A 44.6 A 51 A 51 A 48 A   eGFR if non  51 A 41.2 A 34.2 A 38.6 A  38.6 A 38.6 A 44 A 44 A 41 A   Calcium 8.9 9.2 9.6 9.3  9.3 8.7 9.4 9.2 9.0   Magnesium 2.0 2.0 2.0 2.1 2.1  --   --   --        CARDIAC BIOMARKERS:  Recent Labs   Lab 01/18/18  0008  07/25/19  1319  07/31/19  1009  07/31/19  2243 08/02/19  2217 10/27/19  1757 10/27/19  2141 10/28/19  0230   CPK 84  --  235 H  235 H  --   --   --   --   --   --  71   CPK MB 2.1  --  3.2  --   --   --   --   --   --   --    Troponin I 1.291 H   < > 1.684 H   < > 1.823 H 1.743 H 1.580 H 1.167 H 1.221 H  --     < > = values in this interval not displayed.       COAGS:  Recent Labs   Lab 01/17/18  0636 08/05/19  0632 10/27/19  1757   INR 1.1 1.1 1.1       LIPIDS/LFTS:  Recent Labs   Lab 01/18/18  0254  07/25/19  1319 07/26/19  0203 07/30/19 2001 07/30/19 2232 07/31/19  0406 10/27/19  1757 10/29/19  0358   Cholesterol 156  --   --   --   --  116 L  --   --  123   Triglycerides 81  --   --   --   --  71  --   --  66   HDL 38 L  --   --   --   --  11 L  --   --  22 L   LDL Cholesterol 101.8  --   --   --   --  90.8  --   --  87.8   Non-HDL Cholesterol 118  --   --   --   --  105  --   --  101   AST 29   < > 43 H 38 35  --  33 23  --    ALT 33   < > 34 36 43  --  44 26  --     < > = values in this interval not displayed.       BNP:  Recent Labs   Lab 01/17/18 0636 03/25/18  1752 07/30/19  2001 10/27/19  1757   BNP 1,849 H 1,039 H 3,044 H 3,317 H       TSH:        Free T4:        Diagnostic Results:  ECG (personally reviewed and interpreted tracing(s)):  10/27/19 1736 SR 78, PVCs, NSSTTW changes (lat TWI not as prov vs tracing 8/5/19)    Chest X-Ray (personally reviewed and interpreted image(s)): 10/27/19 ?RLL PNA, CMeg, L ICD (1 lead)    L MPI 10/29/19 (images personally reviewed and interpreted)    Abnormal Luann myocardial perfusion study.    There is a large sized, severe intensity, fixed defect in the basal to apical inferior wall(s) in the typical distribution of the RCA territory.    Gated perfusion images showed an ejection fraction of 24 % post stress.    There is severe global hypokinesis at stress.    There is basal to apical inferior wall akinesis at stress.    LV cavity size is  and moderately enlarged at stress.    Echo: 10/28/19  (images personally reviewed and interpreted; c/w report 7/2019: similar LVEF, MR not noted)  · Moderately decreased left ventricular systolic function. The estimated ejection fraction is 30%  · Severe global hypokinetic wall motion. Cannot exclude septal WMA.  · Grade III (severe) left ventricular diastolic dysfunction consistent with restrictive physiology.  · Mild right ventricular enlargement.  · Mildly to moderately reduced right ventricular systolic function.  · Mild tricuspid regurgitation.  · The estimated PA systolic pressure is 49 mm Hg  · Pulmonary hypertension present.     Cath: 7/26/19 (images personally reviewed and interpreted)  1.  Severe triple-vessel coronary artery disease  2.  Patent BARAJAS to LAD, patent SVG to diagonal 1 and patent SVG to circumflex.  No culprit stenosis noted in these grafts  3.  Mid to distal RCA .  Distal RCA fills with collaterals from the left.  Unchanged from prior.  Findings/Key Components:  LVEDP: 19 mmHg  Dominance: Right   LM:  Patent with no significant stenosis  LAD:  Diffusely disease proximal to mid.  Fills with LIMA to LAD and SVG to diagonal 1  LCx:  Native vessel diffusely disease.  Fills with SVG to OM2  RCA:  Proximal 70% stenosis.  Distal .  Fills with collaterals from the left.  Hemostasis:  RFA 5 [place] sheath.  Manual pressure held in the cath lab for hemostasis  Impression:  Severe triple-vessel coronary artery disease.  Patent LIMA to LAD, SVG to OM and SVG to diagonal.  No acute culprit lesion identified  Plan:  Aspirin 81 mg daily indefinitely  Plavix 75 mg daily for at least 1 year  Follow-up in Cardiology Clinic      Assessment and Plan:     * NSTEMI (non-ST elevated myocardial infarction)  CP abated.  Trop elev similar to prev with cath at that time without culprit (7/2019)  MPI 10/29/18 with dense inferior scar, no appreciable ischemia.  Cont ASA/Plavix  Cont Imdur/ACEi/coreg  No further inpat cardiac testing planned  Stop heparin gtt and  ambulat pt with plan for discharge this PM.  Next antianginal: Ranexa      Ischemic cardiomyopathy  As per NSTEMI section  Known severe LV dysfxn, pt appears euvolemic  Echo with stable LVEF 30%, MR not noted.  Cont ACEi/aldact/coreg/imdur    Syncope  Reported by pt 3 days prior to adm.  Biotronik ICD interrogation neg for sustained arrhythmia (NSVT/PSVT noted).  Normal device fxn.    Mixed hyperlipidemia  Cont statin    Essential hypertension  Cont med rx, BP now controlled    CKD stage 3  Creat stable    ICD (implantable cardioverter-defibrillator), single, in situ  Biotronik ICD functioning normally  No sustained arrhythmia noted on interrogation 10/28/19    Tobacco abuse  Pt reports abstinence    History of cocaine use  Urine tox neg        VTE Risk Mitigation (From admission, onward)         Ordered     IP VTE HIGH RISK PATIENT  Once      10/28/19 0322     Place sequential compression device  Until discontinued      10/28/19 0322              Dispo planning appropriate.  Pt to follow up with Dr. Ramirez (or [facility]) 2 weeks.    Aftab Lemus MD  Cardiology  [facility]

## 2020-01-01 ENCOUNTER — HOSPITAL ENCOUNTER (OUTPATIENT)
Facility: HOSPITAL | Age: 55
Discharge: HOME | End: 2020-02-28
Attending: EMERGENCY MEDICINE | Admitting: EMERGENCY MEDICINE
Payer: MEDICAID

## 2020-01-01 ENCOUNTER — TELEPHONE (OUTPATIENT)
Dept: CARDIOLOGY | Facility: HOSPITAL | Age: 55
End: 2020-01-01

## 2020-01-01 ENCOUNTER — HOSPITAL ENCOUNTER (EMERGENCY)
Facility: HOSPITAL | Age: 55
End: 2020-07-10
Attending: EMERGENCY MEDICINE
Payer: MEDICAID

## 2020-01-01 ENCOUNTER — HOSPITAL ENCOUNTER (INPATIENT)
Facility: HOSPITAL | Age: 55
LOS: 2 days | Discharge: HOME | DRG: 305 | End: 2020-01-03
Attending: EMERGENCY MEDICINE | Admitting: HOSPITALIST
Payer: MEDICAID

## 2020-01-01 ENCOUNTER — DOCUMENTATION ONLY (OUTPATIENT)
Dept: CARDIOLOGY | Facility: HOSPITAL | Age: 55
End: 2020-01-01

## 2020-01-01 VITALS
HEART RATE: 58 BPM | BODY MASS INDEX: 27.46 KG/M2 | RESPIRATION RATE: 18 BRPM | SYSTOLIC BLOOD PRESSURE: 144 MMHG | WEIGHT: 170.88 LBS | DIASTOLIC BLOOD PRESSURE: 98 MMHG | OXYGEN SATURATION: 96 % | HEIGHT: 66 IN | TEMPERATURE: 97 F

## 2020-01-01 VITALS
BODY MASS INDEX: 30.16 KG/M2 | RESPIRATION RATE: 17 BRPM | OXYGEN SATURATION: 96 % | HEART RATE: 56 BPM | HEIGHT: 66 IN | SYSTOLIC BLOOD PRESSURE: 112 MMHG | TEMPERATURE: 97 F | WEIGHT: 187.63 LBS | DIASTOLIC BLOOD PRESSURE: 67 MMHG

## 2020-01-01 DIAGNOSIS — I50.43 ACUTE ON CHRONIC COMBINED SYSTOLIC AND DIASTOLIC CONGESTIVE HEART FAILURE: Primary | ICD-10-CM

## 2020-01-01 DIAGNOSIS — I50.9 CHF (CONGESTIVE HEART FAILURE): ICD-10-CM

## 2020-01-01 DIAGNOSIS — Z95.810 ICD (IMPLANTABLE CARDIOVERTER-DEFIBRILLATOR), SINGLE, IN SITU: ICD-10-CM

## 2020-01-01 DIAGNOSIS — I50.9 CONGESTIVE HEART FAILURE, UNSPECIFIED HF CHRONICITY, UNSPECIFIED HEART FAILURE TYPE: ICD-10-CM

## 2020-01-01 DIAGNOSIS — I46.9 CARDIOPULMONARY ARREST: Primary | ICD-10-CM

## 2020-01-01 DIAGNOSIS — E78.2 MIXED HYPERLIPIDEMIA: Chronic | ICD-10-CM

## 2020-01-01 DIAGNOSIS — I10 ESSENTIAL HYPERTENSION: Chronic | ICD-10-CM

## 2020-01-01 DIAGNOSIS — Z72.0 TOBACCO ABUSE: Chronic | ICD-10-CM

## 2020-01-01 DIAGNOSIS — I25.5 ISCHEMIC CARDIOMYOPATHY: Chronic | ICD-10-CM

## 2020-01-01 DIAGNOSIS — N18.30 CKD (CHRONIC KIDNEY DISEASE) STAGE 3, GFR 30-59 ML/MIN: Chronic | ICD-10-CM

## 2020-01-01 DIAGNOSIS — R06.02 SHORTNESS OF BREATH: ICD-10-CM

## 2020-01-01 DIAGNOSIS — I50.42 CHRONIC COMBINED SYSTOLIC AND DIASTOLIC CONGESTIVE HEART FAILURE: ICD-10-CM

## 2020-01-01 DIAGNOSIS — I50.42 CHRONIC COMBINED SYSTOLIC AND DIASTOLIC HEART FAILURE: ICD-10-CM

## 2020-01-01 DIAGNOSIS — R07.9 CHEST PAIN: ICD-10-CM

## 2020-01-01 DIAGNOSIS — R79.89 ELEVATED TROPONIN: ICD-10-CM

## 2020-01-01 DIAGNOSIS — I16.1 HYPERTENSIVE EMERGENCY WITHOUT CONGESTIVE HEART FAILURE: Primary | ICD-10-CM

## 2020-01-01 DIAGNOSIS — R07.9 ACUTE CHEST PAIN: ICD-10-CM

## 2020-01-01 DIAGNOSIS — D63.1 ANEMIA IN STAGE 3 CHRONIC KIDNEY DISEASE: ICD-10-CM

## 2020-01-01 DIAGNOSIS — R55 SYNCOPE, UNSPECIFIED SYNCOPE TYPE: ICD-10-CM

## 2020-01-01 DIAGNOSIS — I25.700 CORONARY ARTERY DISEASE INVOLVING CORONARY BYPASS GRAFT OF NATIVE HEART WITH UNSTABLE ANGINA PECTORIS: ICD-10-CM

## 2020-01-01 DIAGNOSIS — Z86.79 HISTORY OF MALIGNANT HYPERTENSION: ICD-10-CM

## 2020-01-01 DIAGNOSIS — N18.30 ANEMIA IN STAGE 3 CHRONIC KIDNEY DISEASE: ICD-10-CM

## 2020-01-01 DIAGNOSIS — I10 HYPERTENSION: ICD-10-CM

## 2020-01-01 DIAGNOSIS — I21.4 NSTEMI (NON-ST ELEVATED MYOCARDIAL INFARCTION): ICD-10-CM

## 2020-01-01 LAB
ABDOMINAL AORTA MID EDV: 0 CM/S
ABDOMINAL AORTA MID PSV: 44 CM/S
ALBUMIN SERPL BCP-MCNC: 3.1 G/DL (ref 3.5–5.2)
ALBUMIN SERPL BCP-MCNC: 3.2 G/DL (ref 3.5–5.2)
ALBUMIN SERPL BCP-MCNC: 3.4 G/DL (ref 3.5–5.2)
ALBUMIN SERPL BCP-MCNC: 3.9 G/DL (ref 3.5–5.2)
ALP SERPL-CCNC: 115 U/L (ref 55–135)
ALP SERPL-CCNC: 122 U/L (ref 55–135)
ALP SERPL-CCNC: 146 U/L (ref 55–135)
ALP SERPL-CCNC: 153 U/L (ref 55–135)
ALT SERPL W/O P-5'-P-CCNC: 23 U/L (ref 10–44)
ALT SERPL W/O P-5'-P-CCNC: 28 U/L (ref 10–44)
ALT SERPL W/O P-5'-P-CCNC: 31 U/L (ref 10–44)
ALT SERPL W/O P-5'-P-CCNC: 70 U/L (ref 10–44)
ANION GAP SERPL CALC-SCNC: 13 MMOL/L (ref 8–16)
ANION GAP SERPL CALC-SCNC: 18 MMOL/L (ref 8–16)
ANION GAP SERPL CALC-SCNC: 7 MMOL/L (ref 8–16)
ANION GAP SERPL CALC-SCNC: 8 MMOL/L (ref 8–16)
ANION GAP SERPL CALC-SCNC: 9 MMOL/L (ref 8–16)
AST SERPL-CCNC: 26 U/L (ref 10–40)
AST SERPL-CCNC: 28 U/L (ref 10–40)
AST SERPL-CCNC: 35 U/L (ref 10–40)
AST SERPL-CCNC: 45 U/L (ref 10–40)
BASOPHILS # BLD AUTO: 0.04 K/UL (ref 0–0.2)
BASOPHILS # BLD AUTO: 0.05 K/UL (ref 0–0.2)
BASOPHILS # BLD AUTO: 0.07 K/UL (ref 0–0.2)
BASOPHILS # BLD AUTO: 0.1 K/UL (ref 0–0.2)
BASOPHILS NFR BLD: 0.8 % (ref 0–1.9)
BASOPHILS NFR BLD: 1 % (ref 0–1.9)
BASOPHILS NFR BLD: 1.2 % (ref 0–1.9)
BASOPHILS NFR BLD: 1.7 % (ref 0–1.9)
BILIRUB SERPL-MCNC: 1.5 MG/DL (ref 0.1–1)
BILIRUB SERPL-MCNC: 2.2 MG/DL (ref 0.1–1)
BILIRUB SERPL-MCNC: 3.5 MG/DL (ref 0.1–1)
BILIRUB SERPL-MCNC: 4 MG/DL (ref 0.1–1)
BNP SERPL-MCNC: 3693 PG/ML (ref 0–99)
BNP SERPL-MCNC: 4501 PG/ML (ref 0–99)
BUN SERPL-MCNC: 20 MG/DL (ref 6–20)
BUN SERPL-MCNC: 21 MG/DL (ref 6–20)
BUN SERPL-MCNC: 22 MG/DL (ref 6–20)
BUN SERPL-MCNC: 23 MG/DL (ref 6–20)
BUN SERPL-MCNC: 23 MG/DL (ref 6–20)
CALCIUM SERPL-MCNC: 10.3 MG/DL (ref 8.7–10.5)
CALCIUM SERPL-MCNC: 8.3 MG/DL (ref 8.7–10.5)
CALCIUM SERPL-MCNC: 8.8 MG/DL (ref 8.7–10.5)
CALCIUM SERPL-MCNC: 9.1 MG/DL (ref 8.7–10.5)
CALCIUM SERPL-MCNC: 9.5 MG/DL (ref 8.7–10.5)
CHLORIDE SERPL-SCNC: 100 MMOL/L (ref 95–110)
CHLORIDE SERPL-SCNC: 101 MMOL/L (ref 95–110)
CHLORIDE SERPL-SCNC: 102 MMOL/L (ref 95–110)
CHLORIDE SERPL-SCNC: 102 MMOL/L (ref 95–110)
CHLORIDE SERPL-SCNC: 103 MMOL/L (ref 95–110)
CK SERPL-CCNC: 85 U/L (ref 20–200)
CO2 SERPL-SCNC: 21 MMOL/L (ref 23–29)
CO2 SERPL-SCNC: 27 MMOL/L (ref 23–29)
CO2 SERPL-SCNC: 27 MMOL/L (ref 23–29)
CO2 SERPL-SCNC: 30 MMOL/L (ref 23–29)
CO2 SERPL-SCNC: 30 MMOL/L (ref 23–29)
CREAT SERPL-MCNC: 1.3 MG/DL (ref 0.5–1.4)
CREAT SERPL-MCNC: 1.5 MG/DL (ref 0.5–1.4)
CREAT SERPL-MCNC: 1.6 MG/DL (ref 0.5–1.4)
DACRYOCYTES BLD QL SMEAR: ABNORMAL
DIFFERENTIAL METHOD: ABNORMAL
EOSINOPHIL # BLD AUTO: 0 K/UL (ref 0–0.5)
EOSINOPHIL # BLD AUTO: 0 K/UL (ref 0–0.5)
EOSINOPHIL # BLD AUTO: 0.1 K/UL (ref 0–0.5)
EOSINOPHIL # BLD AUTO: 0.2 K/UL (ref 0–0.5)
EOSINOPHIL NFR BLD: 0.2 % (ref 0–8)
EOSINOPHIL NFR BLD: 0.3 % (ref 0–8)
EOSINOPHIL NFR BLD: 2.4 % (ref 0–8)
EOSINOPHIL NFR BLD: 2.5 % (ref 0–8)
ERYTHROCYTE [DISTWIDTH] IN BLOOD BY AUTOMATED COUNT: 22.6 % (ref 11.5–14.5)
ERYTHROCYTE [DISTWIDTH] IN BLOOD BY AUTOMATED COUNT: 22.8 % (ref 11.5–14.5)
ERYTHROCYTE [DISTWIDTH] IN BLOOD BY AUTOMATED COUNT: 23.1 % (ref 11.5–14.5)
ERYTHROCYTE [DISTWIDTH] IN BLOOD BY AUTOMATED COUNT: 24.3 % (ref 11.5–14.5)
EST. GFR  (AFRICAN AMERICAN): 55 ML/MIN/1.73 M^2
EST. GFR  (AFRICAN AMERICAN): 59 ML/MIN/1.73 M^2
EST. GFR  (AFRICAN AMERICAN): >60 ML/MIN/1.73 M^2
EST. GFR  (NON AFRICAN AMERICAN): 47 ML/MIN/1.73 M^2
EST. GFR  (NON AFRICAN AMERICAN): 51 ML/MIN/1.73 M^2
EST. GFR  (NON AFRICAN AMERICAN): >60 ML/MIN/1.73 M^2
ESTIMATED AVG GLUCOSE: 128 MG/DL (ref 68–131)
GLUCOSE SERPL-MCNC: 103 MG/DL (ref 70–110)
GLUCOSE SERPL-MCNC: 107 MG/DL (ref 70–110)
GLUCOSE SERPL-MCNC: 109 MG/DL (ref 70–110)
GLUCOSE SERPL-MCNC: 75 MG/DL (ref 70–110)
GLUCOSE SERPL-MCNC: 83 MG/DL (ref 70–110)
HBA1C MFR BLD HPLC: 6.1 % (ref 4–5.6)
HCT VFR BLD AUTO: 33.3 % (ref 40–54)
HCT VFR BLD AUTO: 35.3 % (ref 40–54)
HCT VFR BLD AUTO: 35.4 % (ref 40–54)
HCT VFR BLD AUTO: 36.9 % (ref 40–54)
HGB BLD-MCNC: 10.8 G/DL (ref 14–18)
HGB BLD-MCNC: 10.9 G/DL (ref 14–18)
HGB BLD-MCNC: 11.2 G/DL (ref 14–18)
HGB BLD-MCNC: 9.8 G/DL (ref 14–18)
IMM GRANULOCYTES # BLD AUTO: 0.02 K/UL (ref 0–0.04)
IMM GRANULOCYTES # BLD AUTO: 0.03 K/UL (ref 0–0.04)
IMM GRANULOCYTES NFR BLD AUTO: 0.3 % (ref 0–0.5)
IMM GRANULOCYTES NFR BLD AUTO: 0.4 % (ref 0–0.5)
IMM GRANULOCYTES NFR BLD AUTO: 0.4 % (ref 0–0.5)
IMM GRANULOCYTES NFR BLD AUTO: 0.5 % (ref 0–0.5)
INR PPP: 1.3 (ref 0.8–1.2)
LEFT RENAL DIST DIAS: 12 CM/S
LEFT RENAL DIST SYS: 45 CM/S
LEFT RENAL MID DIAS: 14 CM/S
LEFT RENAL MID RAR: 1.23
LEFT RENAL MID SYS: 54 CM/S
LEFT RENAL ORIGIN DIAS: 15 CM/S
LEFT RENAL ORIGIN SYS: 46 CM/S
LEFT RENAL PROX DIAS: 16 CM/S
LEFT RENAL PROX SYS: 57 CM/S
LEFT RENAL ULTRASOUND ACCELERATION TIME MEASUREMENT 1: 222 MS
LEFT RENAL ULTRASOUND ACCELERATION TIME MEASUREMENT 2: 168 MS
LEFT RENAL ULTRASOUND ACCELERATION TIME MEASUREMENT 3: 60 MS
LEFT RENAL ULTRASOUND ACCELERATION TIME MEASUREMENT AVERAGE: 222 MS
LEFT RENAL ULTRASOUND KIDNEY SIZE MEASUREMENT 1: 10.7 CM
LEFT RENAL ULTRASOUND KIDNEY SIZE MEASUREMENT 2: 4.8 CM
LEFT RENAL ULTRASOUND KIDNEY SIZE MEASUREMENT 3: 4.5 CM
LEFT RENAL ULTRASOUND KIDNEY SIZE MEASUREMENT AVERAGE: 10.7 CM
LEFT RENAL ULTRASOUND RESISTIVE INDEX MEASUREMENT 1: 0.68
LEFT RENAL ULTRASOUND RESISTIVE INDEX MEASUREMENT 2: 0.59
LEFT RENAL ULTRASOUND RESISTIVE INDEX MEASUREMENT 3: 0.64
LEFT RENAL ULTRASOUND RESISTIVE INDEX MEASUREMENT AVERAGE: 0.68
LIPASE SERPL-CCNC: 15 U/L (ref 4–60)
LIPASE SERPL-CCNC: 38 U/L (ref 4–60)
LYMPHOCYTES # BLD AUTO: 1.1 K/UL (ref 1–4.8)
LYMPHOCYTES # BLD AUTO: 1.2 K/UL (ref 1–4.8)
LYMPHOCYTES # BLD AUTO: 1.2 K/UL (ref 1–4.8)
LYMPHOCYTES # BLD AUTO: 1.3 K/UL (ref 1–4.8)
LYMPHOCYTES NFR BLD: 20.1 % (ref 18–48)
LYMPHOCYTES NFR BLD: 21.1 % (ref 18–48)
LYMPHOCYTES NFR BLD: 23.3 % (ref 18–48)
LYMPHOCYTES NFR BLD: 26.1 % (ref 18–48)
MAGNESIUM SERPL-MCNC: 1.5 MG/DL (ref 1.6–2.6)
MAGNESIUM SERPL-MCNC: 1.5 MG/DL (ref 1.6–2.6)
MAGNESIUM SERPL-MCNC: 1.7 MG/DL (ref 1.6–2.6)
MCH RBC QN AUTO: 23.8 PG (ref 27–31)
MCH RBC QN AUTO: 23.8 PG (ref 27–31)
MCH RBC QN AUTO: 24.1 PG (ref 27–31)
MCH RBC QN AUTO: 26.4 PG (ref 27–31)
MCHC RBC AUTO-ENTMCNC: 29.4 G/DL (ref 32–36)
MCHC RBC AUTO-ENTMCNC: 30.4 G/DL (ref 32–36)
MCHC RBC AUTO-ENTMCNC: 30.6 G/DL (ref 32–36)
MCHC RBC AUTO-ENTMCNC: 30.8 G/DL (ref 32–36)
MCV RBC AUTO: 78 FL (ref 82–98)
MCV RBC AUTO: 79 FL (ref 82–98)
MCV RBC AUTO: 81 FL (ref 82–98)
MCV RBC AUTO: 86 FL (ref 82–98)
MONOCYTES # BLD AUTO: 0.8 K/UL (ref 0.3–1)
MONOCYTES # BLD AUTO: 0.9 K/UL (ref 0.3–1)
MONOCYTES # BLD AUTO: 1 K/UL (ref 0.3–1)
MONOCYTES # BLD AUTO: 1.1 K/UL (ref 0.3–1)
MONOCYTES NFR BLD: 15.6 % (ref 4–15)
MONOCYTES NFR BLD: 15.9 % (ref 4–15)
MONOCYTES NFR BLD: 18.5 % (ref 4–15)
MONOCYTES NFR BLD: 20.9 % (ref 4–15)
NEUTROPHILS # BLD AUTO: 2.5 K/UL (ref 1.8–7.7)
NEUTROPHILS # BLD AUTO: 2.8 K/UL (ref 1.8–7.7)
NEUTROPHILS # BLD AUTO: 3.5 K/UL (ref 1.8–7.7)
NEUTROPHILS # BLD AUTO: 3.5 K/UL (ref 1.8–7.7)
NEUTROPHILS NFR BLD: 49.2 % (ref 38–73)
NEUTROPHILS NFR BLD: 58.6 % (ref 38–73)
NEUTROPHILS NFR BLD: 59.4 % (ref 38–73)
NEUTROPHILS NFR BLD: 59.6 % (ref 38–73)
NRBC BLD-RTO: 0 /100 WBC
OHS CV LEFT RENAL RAR: 1.3
OHS CV RIGHT RENAL RAR: 1.77
OHS CV US LEFT RENAL HIGHEST EDV: 16
OHS CV US LEFT RENAL HIGHEST PSV: 57
OHS CV US RIGHT RENAL HIGHEST EDV: 74
OHS CV US RIGHT RENAL HIGHEST PSV: 78
OVALOCYTES BLD QL SMEAR: ABNORMAL
PLATELET # BLD AUTO: 206 K/UL (ref 150–350)
PLATELET # BLD AUTO: 232 K/UL (ref 150–350)
PLATELET # BLD AUTO: 279 K/UL (ref 150–350)
PLATELET # BLD AUTO: 314 K/UL (ref 150–350)
PMV BLD AUTO: 10.9 FL (ref 9.2–12.9)
PMV BLD AUTO: 10.9 FL (ref 9.2–12.9)
PMV BLD AUTO: 11 FL (ref 9.2–12.9)
PMV BLD AUTO: 11.2 FL (ref 9.2–12.9)
POCT GLUCOSE: 101 MG/DL (ref 70–110)
POIKILOCYTOSIS BLD QL SMEAR: ABNORMAL
POLYCHROMASIA BLD QL SMEAR: ABNORMAL
POTASSIUM SERPL-SCNC: 3.1 MMOL/L (ref 3.5–5.1)
POTASSIUM SERPL-SCNC: 3.1 MMOL/L (ref 3.5–5.1)
POTASSIUM SERPL-SCNC: 3.5 MMOL/L (ref 3.5–5.1)
POTASSIUM SERPL-SCNC: 3.6 MMOL/L (ref 3.5–5.1)
POTASSIUM SERPL-SCNC: 3.6 MMOL/L (ref 3.5–5.1)
PROT SERPL-MCNC: 6.1 G/DL (ref 6–8.4)
PROT SERPL-MCNC: 6.1 G/DL (ref 6–8.4)
PROT SERPL-MCNC: 6.9 G/DL (ref 6–8.4)
PROT SERPL-MCNC: 7.4 G/DL (ref 6–8.4)
PROTHROMBIN TIME: 13.4 SEC (ref 9–12.5)
RBC # BLD AUTO: 4.12 M/UL (ref 4.6–6.2)
RBC # BLD AUTO: 4.13 M/UL (ref 4.6–6.2)
RBC # BLD AUTO: 4.49 M/UL (ref 4.6–6.2)
RBC # BLD AUTO: 4.71 M/UL (ref 4.6–6.2)
RIGHT RENAL DIST DIAS: 74 CM/S
RIGHT RENAL DIST SYS: 23 CM/S
RIGHT RENAL MID DIAS: 13 CM/S
RIGHT RENAL MID RAR: 1.07
RIGHT RENAL MID SYS: 47 CM/S
RIGHT RENAL ORIGIN DIAS: 13 CM/S
RIGHT RENAL ORIGIN SYS: 52 CM/S
RIGHT RENAL PROX DIAS: 18 CM/S
RIGHT RENAL PROX SYS: 78 CM/S
RIGHT RENAL ULTRASOUND ACCELERATION TIME MEASUREMENT 1: 78 MS
RIGHT RENAL ULTRASOUND ACCELERATION TIME MEASUREMENT 2: 204 MS
RIGHT RENAL ULTRASOUND ACCELERATION TIME MEASUREMENT 3: 156 MS
RIGHT RENAL ULTRASOUND ACCELERATION TIME MEASUREMENT AVERAGE: 204 MS
RIGHT RENAL ULTRASOUND KIDNEY SIZE MEASUREMENT 1: 12.5 CM
RIGHT RENAL ULTRASOUND KIDNEY SIZE MEASUREMENT 2: 5 CM
RIGHT RENAL ULTRASOUND KIDNEY SIZE MEASUREMENT 3: 4.8 CM
RIGHT RENAL ULTRASOUND KIDNEY SIZE MEASUREMENT AVERAGE: 12.5 CM
RIGHT RENAL ULTRASOUND RESISTIVE INDEX MEASUREMENT 1: 0.81
RIGHT RENAL ULTRASOUND RESISTIVE INDEX MEASUREMENT 2: 0.5
RIGHT RENAL ULTRASOUND RESISTIVE INDEX MEASUREMENT 3: 0.69
RIGHT RENAL ULTRASOUND RESISTIVE INDEX MEASUREMENT AVERAGE: 0.81
SODIUM SERPL-SCNC: 138 MMOL/L (ref 136–145)
SODIUM SERPL-SCNC: 139 MMOL/L (ref 136–145)
SODIUM SERPL-SCNC: 140 MMOL/L (ref 136–145)
SODIUM SERPL-SCNC: 140 MMOL/L (ref 136–145)
SODIUM SERPL-SCNC: 141 MMOL/L (ref 136–145)
TROPONIN I SERPL DL<=0.01 NG/ML-MCNC: 0.83 NG/ML (ref 0–0.03)
TROPONIN I SERPL DL<=0.01 NG/ML-MCNC: 0.84 NG/ML (ref 0–0.03)
TROPONIN I SERPL DL<=0.01 NG/ML-MCNC: 0.99 NG/ML (ref 0–0.03)
TROPONIN I SERPL DL<=0.01 NG/ML-MCNC: 1.04 NG/ML (ref 0–0.03)
TROPONIN I SERPL DL<=0.01 NG/ML-MCNC: 1.1 NG/ML (ref 0–0.03)
TROPONIN I SERPL DL<=0.01 NG/ML-MCNC: 1.11 NG/ML (ref 0–0.03)
WBC # BLD AUTO: 4.73 K/UL (ref 3.9–12.7)
WBC # BLD AUTO: 4.98 K/UL (ref 3.9–12.7)
WBC # BLD AUTO: 5.89 K/UL (ref 3.9–12.7)
WBC # BLD AUTO: 5.91 K/UL (ref 3.9–12.7)

## 2020-01-01 PROCEDURE — 80048 BASIC METABOLIC PNL TOTAL CA: CPT

## 2020-01-01 PROCEDURE — 83690 ASSAY OF LIPASE: CPT

## 2020-01-01 PROCEDURE — G0378 HOSPITAL OBSERVATION PER HR: HCPCS

## 2020-01-01 PROCEDURE — 85025 COMPLETE CBC W/AUTO DIFF WBC: CPT

## 2020-01-01 PROCEDURE — 25000003 PHARM REV CODE 250: Performed by: INTERNAL MEDICINE

## 2020-01-01 PROCEDURE — 63600175 PHARM REV CODE 636 W HCPCS: Performed by: EMERGENCY MEDICINE

## 2020-01-01 PROCEDURE — 92950 HEART/LUNG RESUSCITATION CPR: CPT

## 2020-01-01 PROCEDURE — 25000003 PHARM REV CODE 250: Performed by: NURSE PRACTITIONER

## 2020-01-01 PROCEDURE — 96375 TX/PRO/DX INJ NEW DRUG ADDON: CPT

## 2020-01-01 PROCEDURE — 36415 COLL VENOUS BLD VENIPUNCTURE: CPT

## 2020-01-01 PROCEDURE — 96376 TX/PRO/DX INJ SAME DRUG ADON: CPT

## 2020-01-01 PROCEDURE — 99220 PR INITIAL OBSERVATION CARE,LEVL III: ICD-10-PCS | Mod: ,,, | Performed by: INTERNAL MEDICINE

## 2020-01-01 PROCEDURE — 63600175 PHARM REV CODE 636 W HCPCS: Performed by: NURSE PRACTITIONER

## 2020-01-01 PROCEDURE — 63700000 PHARM REV CODE 250 ALT 637 W/O HCPCS: Performed by: INTERNAL MEDICINE

## 2020-01-01 PROCEDURE — 25000003 PHARM REV CODE 250: Performed by: EMERGENCY MEDICINE

## 2020-01-01 PROCEDURE — 80053 COMPREHEN METABOLIC PANEL: CPT

## 2020-01-01 PROCEDURE — 25000003 PHARM REV CODE 250: Performed by: HOSPITALIST

## 2020-01-01 PROCEDURE — 83880 ASSAY OF NATRIURETIC PEPTIDE: CPT

## 2020-01-01 PROCEDURE — S4991 NICOTINE PATCH NONLEGEND: HCPCS | Performed by: HOSPITALIST

## 2020-01-01 PROCEDURE — 82550 ASSAY OF CK (CPK): CPT

## 2020-01-01 PROCEDURE — 20000000 HC ICU ROOM

## 2020-01-01 PROCEDURE — 84484 ASSAY OF TROPONIN QUANT: CPT | Mod: 91

## 2020-01-01 PROCEDURE — 94761 N-INVAS EAR/PLS OXIMETRY MLT: CPT

## 2020-01-01 PROCEDURE — 96374 THER/PROPH/DIAG INJ IV PUSH: CPT

## 2020-01-01 PROCEDURE — 83735 ASSAY OF MAGNESIUM: CPT

## 2020-01-01 PROCEDURE — 93010 ELECTROCARDIOGRAM REPORT: CPT | Mod: ,,, | Performed by: INTERNAL MEDICINE

## 2020-01-01 PROCEDURE — 84484 ASSAY OF TROPONIN QUANT: CPT

## 2020-01-01 PROCEDURE — 63600175 PHARM REV CODE 636 W HCPCS: Performed by: HOSPITALIST

## 2020-01-01 PROCEDURE — 27000221 HC OXYGEN, UP TO 24 HOURS

## 2020-01-01 PROCEDURE — 99220 PR INITIAL OBSERVATION CARE,LEVL III: CPT | Mod: ,,, | Performed by: INTERNAL MEDICINE

## 2020-01-01 PROCEDURE — 99285 EMERGENCY DEPT VISIT HI MDM: CPT | Mod: 25

## 2020-01-01 PROCEDURE — 93010 EKG 12-LEAD: ICD-10-PCS | Mod: ,,, | Performed by: INTERNAL MEDICINE

## 2020-01-01 PROCEDURE — 99291 CRITICAL CARE FIRST HOUR: CPT | Mod: ,,, | Performed by: INTERNAL MEDICINE

## 2020-01-01 PROCEDURE — 83735 ASSAY OF MAGNESIUM: CPT | Mod: 91

## 2020-01-01 PROCEDURE — 93010 EKG 12-LEAD: ICD-10-PCS | Mod: 59,76,, | Performed by: INTERNAL MEDICINE

## 2020-01-01 PROCEDURE — 93005 ELECTROCARDIOGRAM TRACING: CPT

## 2020-01-01 PROCEDURE — 83036 HEMOGLOBIN GLYCOSYLATED A1C: CPT

## 2020-01-01 PROCEDURE — 85610 PROTHROMBIN TIME: CPT

## 2020-01-01 PROCEDURE — 99291 PR CRITICAL CARE, E/M 30-74 MINUTES: ICD-10-PCS | Mod: ,,, | Performed by: INTERNAL MEDICINE

## 2020-01-01 PROCEDURE — 93010 ELECTROCARDIOGRAM REPORT: CPT | Mod: 59,76,, | Performed by: INTERNAL MEDICINE

## 2020-01-01 PROCEDURE — 99285 EMERGENCY DEPT VISIT HI MDM: CPT | Mod: 59,25

## 2020-01-01 RX ORDER — ATORVASTATIN CALCIUM 80 MG/1
80 TABLET, FILM COATED ORAL DAILY
Qty: 30 TABLET | Refills: 1 | Status: SHIPPED | OUTPATIENT
Start: 2020-01-01

## 2020-01-01 RX ORDER — LISINOPRIL 40 MG/1
40 TABLET ORAL DAILY
Qty: 30 TABLET | Refills: 1 | Status: SHIPPED | OUTPATIENT
Start: 2020-01-01

## 2020-01-01 RX ORDER — AMIODARONE HYDROCHLORIDE 400 MG/1
400 TABLET ORAL DAILY
Qty: 30 TABLET | Refills: 1 | Status: ON HOLD | OUTPATIENT
Start: 2020-01-01 | End: 2020-01-01 | Stop reason: HOSPADM

## 2020-01-01 RX ORDER — POTASSIUM CHLORIDE 20 MEQ/1
60 TABLET, EXTENDED RELEASE ORAL ONCE
Status: COMPLETED | OUTPATIENT
Start: 2020-01-01 | End: 2020-01-01

## 2020-01-01 RX ORDER — LISINOPRIL 20 MG/1
40 TABLET ORAL DAILY
Status: DISCONTINUED | OUTPATIENT
Start: 2020-01-01 | End: 2020-01-01 | Stop reason: HOSPADM

## 2020-01-01 RX ORDER — NITROGLYCERIN 0.4 MG/1
0.4 TABLET SUBLINGUAL EVERY 5 MIN PRN
Qty: 30 TABLET | Refills: 1 | Status: SHIPPED | OUTPATIENT
Start: 2020-01-01

## 2020-01-01 RX ORDER — ISOSORBIDE MONONITRATE 120 MG/1
120 TABLET, EXTENDED RELEASE ORAL DAILY
Qty: 30 TABLET | Refills: 1 | Status: ON HOLD | OUTPATIENT
Start: 2020-01-01 | End: 2020-01-01 | Stop reason: HOSPADM

## 2020-01-01 RX ORDER — FUROSEMIDE 40 MG/1
40 TABLET ORAL 2 TIMES DAILY
Qty: 60 TABLET | Refills: 1 | Status: SHIPPED | OUTPATIENT
Start: 2020-01-01 | End: 2021-04-01

## 2020-01-01 RX ORDER — POTASSIUM CHLORIDE 20 MEQ/15ML
40 SOLUTION ORAL ONCE
Status: DISCONTINUED | OUTPATIENT
Start: 2020-01-01 | End: 2020-01-01

## 2020-01-01 RX ORDER — NAPROXEN SODIUM 220 MG/1
81 TABLET, FILM COATED ORAL DAILY
Status: DISCONTINUED | OUTPATIENT
Start: 2020-01-01 | End: 2020-01-01 | Stop reason: HOSPADM

## 2020-01-01 RX ORDER — ENOXAPARIN SODIUM 100 MG/ML
30 INJECTION SUBCUTANEOUS EVERY 24 HOURS
Status: DISCONTINUED | OUTPATIENT
Start: 2020-01-01 | End: 2020-01-01 | Stop reason: HOSPADM

## 2020-01-01 RX ORDER — HYDRALAZINE HYDROCHLORIDE 20 MG/ML
5 INJECTION INTRAMUSCULAR; INTRAVENOUS
Status: DISCONTINUED | OUTPATIENT
Start: 2020-01-01 | End: 2020-01-01

## 2020-01-01 RX ORDER — FUROSEMIDE 40 MG/1
40 TABLET ORAL DAILY
Status: DISCONTINUED | OUTPATIENT
Start: 2020-01-01 | End: 2020-01-01 | Stop reason: HOSPADM

## 2020-01-01 RX ORDER — FAMOTIDINE 20 MG/1
20 TABLET, FILM COATED ORAL DAILY
Status: DISCONTINUED | OUTPATIENT
Start: 2020-01-01 | End: 2020-01-01 | Stop reason: HOSPADM

## 2020-01-01 RX ORDER — FUROSEMIDE 10 MG/ML
80 INJECTION INTRAMUSCULAR; INTRAVENOUS
Status: COMPLETED | OUTPATIENT
Start: 2020-01-01 | End: 2020-01-01

## 2020-01-01 RX ORDER — FUROSEMIDE 40 MG/1
40 TABLET ORAL 2 TIMES DAILY
Qty: 60 TABLET | Refills: 1 | Status: SHIPPED | OUTPATIENT
Start: 2020-01-01 | End: 2020-01-01 | Stop reason: SDUPTHER

## 2020-01-01 RX ORDER — FUROSEMIDE 10 MG/ML
40 INJECTION INTRAMUSCULAR; INTRAVENOUS
Status: DISCONTINUED | OUTPATIENT
Start: 2020-01-01 | End: 2020-01-01 | Stop reason: HOSPADM

## 2020-01-01 RX ORDER — HYDRALAZINE HYDROCHLORIDE 20 MG/ML
10 INJECTION INTRAMUSCULAR; INTRAVENOUS EVERY 4 HOURS PRN
Status: DISCONTINUED | OUTPATIENT
Start: 2020-01-01 | End: 2020-01-01

## 2020-01-01 RX ORDER — MORPHINE SULFATE 10 MG/ML
4 INJECTION INTRAMUSCULAR; INTRAVENOUS; SUBCUTANEOUS
Status: COMPLETED | OUTPATIENT
Start: 2020-01-01 | End: 2020-01-01

## 2020-01-01 RX ORDER — SODIUM CHLORIDE 0.9 % (FLUSH) 0.9 %
10 SYRINGE (ML) INJECTION
Status: DISCONTINUED | OUTPATIENT
Start: 2020-01-01 | End: 2020-01-01 | Stop reason: HOSPADM

## 2020-01-01 RX ORDER — CLOPIDOGREL BISULFATE 75 MG/1
75 TABLET ORAL DAILY
Qty: 30 TABLET | Refills: 1 | Status: SHIPPED | OUTPATIENT
Start: 2020-01-01 | End: 2021-01-02

## 2020-01-01 RX ORDER — CARVEDILOL 25 MG/1
25 TABLET ORAL 2 TIMES DAILY
Qty: 60 TABLET | Refills: 1 | Status: ON HOLD | OUTPATIENT
Start: 2020-01-01 | End: 2020-01-01 | Stop reason: HOSPADM

## 2020-01-01 RX ORDER — AMIODARONE HYDROCHLORIDE 200 MG/1
400 TABLET ORAL DAILY
Status: DISCONTINUED | OUTPATIENT
Start: 2020-01-01 | End: 2020-01-01 | Stop reason: HOSPADM

## 2020-01-01 RX ORDER — CARVEDILOL 12.5 MG/1
12.5 TABLET ORAL 2 TIMES DAILY
Status: DISCONTINUED | OUTPATIENT
Start: 2020-01-01 | End: 2020-01-01 | Stop reason: HOSPADM

## 2020-01-01 RX ORDER — ATORVASTATIN CALCIUM 40 MG/1
80 TABLET, FILM COATED ORAL DAILY
Status: DISCONTINUED | OUTPATIENT
Start: 2020-01-01 | End: 2020-01-01 | Stop reason: HOSPADM

## 2020-01-01 RX ORDER — OXYCODONE AND ACETAMINOPHEN 5; 325 MG/1; MG/1
1 TABLET ORAL EVERY 4 HOURS PRN
Status: DISCONTINUED | OUTPATIENT
Start: 2020-01-01 | End: 2020-01-01 | Stop reason: HOSPADM

## 2020-01-01 RX ORDER — ENOXAPARIN SODIUM 100 MG/ML
40 INJECTION SUBCUTANEOUS EVERY 24 HOURS
Status: DISCONTINUED | OUTPATIENT
Start: 2020-01-01 | End: 2020-01-01 | Stop reason: HOSPADM

## 2020-01-01 RX ORDER — ENALAPRILAT 1.25 MG/ML
1.25 INJECTION INTRAVENOUS
Status: COMPLETED | OUTPATIENT
Start: 2020-01-01 | End: 2020-01-01

## 2020-01-01 RX ORDER — ISOSORBIDE MONONITRATE 30 MG/1
120 TABLET, EXTENDED RELEASE ORAL DAILY
Status: DISCONTINUED | OUTPATIENT
Start: 2020-01-01 | End: 2020-01-01 | Stop reason: HOSPADM

## 2020-01-01 RX ORDER — ISOSORBIDE MONONITRATE 30 MG/1
240 TABLET, EXTENDED RELEASE ORAL DAILY
Status: DISCONTINUED | OUTPATIENT
Start: 2020-01-01 | End: 2020-01-01 | Stop reason: HOSPADM

## 2020-01-01 RX ORDER — POTASSIUM CHLORIDE 20 MEQ/15ML
40 SOLUTION ORAL
Status: COMPLETED | OUTPATIENT
Start: 2020-01-01 | End: 2020-01-01

## 2020-01-01 RX ORDER — ACETAMINOPHEN 325 MG/1
650 TABLET ORAL EVERY 8 HOURS PRN
Status: DISCONTINUED | OUTPATIENT
Start: 2020-01-01 | End: 2020-01-01 | Stop reason: HOSPADM

## 2020-01-01 RX ORDER — IBUPROFEN 200 MG
1 TABLET ORAL DAILY
Status: DISCONTINUED | OUTPATIENT
Start: 2020-01-01 | End: 2020-01-01 | Stop reason: HOSPADM

## 2020-01-01 RX ORDER — CARVEDILOL 6.25 MG/1
25 TABLET ORAL 2 TIMES DAILY
Status: DISCONTINUED | OUTPATIENT
Start: 2020-01-01 | End: 2020-01-01 | Stop reason: HOSPADM

## 2020-01-01 RX ORDER — SPIRONOLACTONE 25 MG/1
25 TABLET ORAL DAILY
Status: DISCONTINUED | OUTPATIENT
Start: 2020-01-01 | End: 2020-01-01 | Stop reason: HOSPADM

## 2020-01-01 RX ORDER — FUROSEMIDE 10 MG/ML
40 INJECTION INTRAMUSCULAR; INTRAVENOUS
Status: COMPLETED | OUTPATIENT
Start: 2020-01-01 | End: 2020-01-01

## 2020-01-01 RX ORDER — ONDANSETRON 2 MG/ML
4 INJECTION INTRAMUSCULAR; INTRAVENOUS EVERY 6 HOURS PRN
Status: DISCONTINUED | OUTPATIENT
Start: 2020-01-01 | End: 2020-01-01 | Stop reason: HOSPADM

## 2020-01-01 RX ORDER — ENALAPRILAT 1.25 MG/ML
0.62 INJECTION INTRAVENOUS
Status: COMPLETED | OUTPATIENT
Start: 2020-01-01 | End: 2020-01-01

## 2020-01-01 RX ORDER — NITROGLYCERIN 20 MG/100ML
10 INJECTION INTRAVENOUS CONTINUOUS
Status: DISCONTINUED | OUTPATIENT
Start: 2020-01-01 | End: 2020-01-01

## 2020-01-01 RX ORDER — CLOPIDOGREL BISULFATE 75 MG/1
75 TABLET ORAL DAILY
Status: DISCONTINUED | OUTPATIENT
Start: 2020-01-01 | End: 2020-01-01 | Stop reason: HOSPADM

## 2020-01-01 RX ORDER — SPIRONOLACTONE 25 MG/1
25 TABLET ORAL DAILY
Qty: 30 TABLET | Refills: 1 | Status: SHIPPED | OUTPATIENT
Start: 2020-01-01 | End: 2021-01-02

## 2020-01-01 RX ORDER — MORPHINE SULFATE 10 MG/ML
1 INJECTION INTRAMUSCULAR; INTRAVENOUS; SUBCUTANEOUS EVERY 4 HOURS PRN
Status: DISCONTINUED | OUTPATIENT
Start: 2020-01-01 | End: 2020-01-01

## 2020-01-01 RX ORDER — NICARDIPINE HYDROCHLORIDE 0.2 MG/ML
2.5 INJECTION INTRAVENOUS CONTINUOUS
Status: DISPENSED | OUTPATIENT
Start: 2020-01-01 | End: 2020-01-01

## 2020-01-01 RX ORDER — HYDRALAZINE HYDROCHLORIDE 20 MG/ML
10 INJECTION INTRAMUSCULAR; INTRAVENOUS
Status: DISCONTINUED | OUTPATIENT
Start: 2020-01-01 | End: 2020-01-01

## 2020-01-01 RX ORDER — NITROGLYCERIN 0.4 MG/1
0.4 TABLET SUBLINGUAL EVERY 5 MIN PRN
Status: DISCONTINUED | OUTPATIENT
Start: 2020-01-01 | End: 2020-01-01 | Stop reason: HOSPADM

## 2020-01-01 RX ORDER — AMIODARONE HYDROCHLORIDE 200 MG/1
200 TABLET ORAL DAILY
Status: DISCONTINUED | OUTPATIENT
Start: 2020-01-01 | End: 2020-01-01 | Stop reason: HOSPADM

## 2020-01-01 RX ADMIN — NICARDIPINE HYDROCHLORIDE 2.5 MG/HR: 0.2 INJECTION, SOLUTION INTRAVENOUS at 03:01

## 2020-01-01 RX ADMIN — AMIODARONE HYDROCHLORIDE 200 MG: 200 TABLET ORAL at 06:02

## 2020-01-01 RX ADMIN — ATORVASTATIN CALCIUM 80 MG: 40 TABLET, FILM COATED ORAL at 06:02

## 2020-01-01 RX ADMIN — ISOSORBIDE MONONITRATE 120 MG: 30 TABLET, EXTENDED RELEASE ORAL at 09:01

## 2020-01-01 RX ADMIN — ENOXAPARIN SODIUM 30 MG: 100 INJECTION SUBCUTANEOUS at 04:01

## 2020-01-01 RX ADMIN — SPIRONOLACTONE 25 MG: 25 TABLET ORAL at 09:01

## 2020-01-01 RX ADMIN — LISINOPRIL 40 MG: 20 TABLET ORAL at 09:01

## 2020-01-01 RX ADMIN — FAMOTIDINE 20 MG: 20 TABLET ORAL at 08:02

## 2020-01-01 RX ADMIN — POTASSIUM CHLORIDE 40 MEQ: 20 SOLUTION ORAL at 03:02

## 2020-01-01 RX ADMIN — NITROGLYCERIN 2 INCH: 20 OINTMENT TOPICAL at 05:02

## 2020-01-01 RX ADMIN — CARVEDILOL 12.5 MG: 12.5 TABLET, FILM COATED ORAL at 06:02

## 2020-01-01 RX ADMIN — ISOSORBIDE MONONITRATE 120 MG: 30 TABLET, EXTENDED RELEASE ORAL at 08:01

## 2020-01-01 RX ADMIN — FUROSEMIDE 80 MG: 10 INJECTION, SOLUTION INTRAVENOUS at 09:01

## 2020-01-01 RX ADMIN — FUROSEMIDE 40 MG: 40 TABLET ORAL at 09:01

## 2020-01-01 RX ADMIN — FUROSEMIDE 40 MG: 10 INJECTION, SOLUTION INTRAVENOUS at 05:02

## 2020-01-01 RX ADMIN — POTASSIUM CHLORIDE 60 MEQ: 1500 TABLET, EXTENDED RELEASE ORAL at 08:01

## 2020-01-01 RX ADMIN — NITROGLYCERIN 0.4 MG: 0.4 TABLET SUBLINGUAL at 06:02

## 2020-01-01 RX ADMIN — NICOTINE 1 PATCH: 21 PATCH, EXTENDED RELEASE TRANSDERMAL at 09:01

## 2020-01-01 RX ADMIN — ATORVASTATIN CALCIUM 80 MG: 40 TABLET, FILM COATED ORAL at 09:01

## 2020-01-01 RX ADMIN — ENALAPRILAT 1.25 MG: 2.5 INJECTION INTRAVENOUS at 01:01

## 2020-01-01 RX ADMIN — ENOXAPARIN SODIUM 30 MG: 100 INJECTION SUBCUTANEOUS at 09:01

## 2020-01-01 RX ADMIN — NITROGLYCERIN 10 MCG/MIN: 20 INJECTION INTRAVENOUS at 09:01

## 2020-01-01 RX ADMIN — AMIODARONE HYDROCHLORIDE 400 MG: 200 TABLET ORAL at 09:01

## 2020-01-01 RX ADMIN — ISOSORBIDE MONONITRATE 240 MG: 30 TABLET, EXTENDED RELEASE ORAL at 06:02

## 2020-01-01 RX ADMIN — CLOPIDOGREL BISULFATE 75 MG: 75 TABLET ORAL at 08:01

## 2020-01-01 RX ADMIN — SPIRONOLACTONE 25 MG: 25 TABLET ORAL at 08:01

## 2020-01-01 RX ADMIN — NITROGLYCERIN 2 INCH: 20 OINTMENT TOPICAL at 02:02

## 2020-01-01 RX ADMIN — CARVEDILOL 25 MG: 6.25 TABLET, FILM COATED ORAL at 09:01

## 2020-01-01 RX ADMIN — CLOPIDOGREL BISULFATE 75 MG: 75 TABLET ORAL at 06:02

## 2020-01-01 RX ADMIN — OXYCODONE HYDROCHLORIDE AND ACETAMINOPHEN 1 TABLET: 5; 325 TABLET ORAL at 09:01

## 2020-01-01 RX ADMIN — ASPIRIN 81 MG 81 MG: 81 TABLET ORAL at 06:02

## 2020-01-01 RX ADMIN — ENALAPRILAT 0.62 MG: 2.5 INJECTION INTRAVENOUS at 10:01

## 2020-01-01 RX ADMIN — NITROGLYCERIN 0.4 MG: 0.4 TABLET SUBLINGUAL at 05:02

## 2020-01-01 RX ADMIN — CLOPIDOGREL BISULFATE 75 MG: 75 TABLET ORAL at 09:01

## 2020-01-01 RX ADMIN — MORPHINE SULFATE 1 MG: 10 INJECTION INTRAVENOUS at 06:02

## 2020-01-01 RX ADMIN — ACETAMINOPHEN 650 MG: 325 TABLET ORAL at 05:02

## 2020-01-01 RX ADMIN — CARVEDILOL 25 MG: 6.25 TABLET, FILM COATED ORAL at 08:01

## 2020-01-01 RX ADMIN — NITROGLYCERIN 1 INCH: 20 OINTMENT TOPICAL at 09:01

## 2020-01-01 RX ADMIN — ASPIRIN 81 MG 81 MG: 81 TABLET ORAL at 08:01

## 2020-01-01 RX ADMIN — LISINOPRIL 40 MG: 20 TABLET ORAL at 06:02

## 2020-01-01 RX ADMIN — SPIRONOLACTONE 25 MG: 25 TABLET ORAL at 06:02

## 2020-01-01 RX ADMIN — ATORVASTATIN CALCIUM 80 MG: 40 TABLET, FILM COATED ORAL at 08:01

## 2020-01-01 RX ADMIN — MORPHINE SULFATE 4 MG: 10 INJECTION INTRAVENOUS at 10:01

## 2020-01-01 RX ADMIN — AMIODARONE HYDROCHLORIDE 400 MG: 200 TABLET ORAL at 08:01

## 2020-01-01 RX ADMIN — ASPIRIN 81 MG 81 MG: 81 TABLET ORAL at 09:01

## 2020-01-01 RX ADMIN — FUROSEMIDE 40 MG: 10 INJECTION, SOLUTION INTRAVENOUS at 02:02

## 2020-01-01 NOTE — ED TRIAGE NOTES
Patient states he started with chest pain and sob 3 days ago. Constant coughing upon arrival, unable to lie flat. SOB but 95% on room air. Applied O2@2L/nc for comfort. Mild expiratory wheezing noted. Placed on monitor. Dr. Flores at bedside.

## 2020-01-01 NOTE — ED PROVIDER NOTES
Encounter Date: 1/1/2020    SCRIBE #1 NOTE: I, Linsey Mistry, am scribing for, and in the presence of,  Rafy Flores MD. I have scribed the following portions of the note - Other sections scribed: HPI, ROS, PE, Initial Assessment.       History     Chief Complaint   Patient presents with    Cough     EMS reports pt with hx of CHF c/o cough x 4 days with chest pain. also having some leg swelling. reports some recent changes to his medications.     Chest Pain     54 year old male patient brought to the ED by EMS complaining of leg swelling for the past 3 days. The patient also reports cough, chest pain that is worse with cough, and emesis. He denies any fever, shortness of breath, or diarrhea. Per EMS, the patient has a past medical history of Congestive Heart Failure. EMS also reports that the patient has recently had some changes with his regular medications. The patient has significant history of my card up the, MI 2013, 2015, 2016, 2018 (total of 4 stents), triple CABG July 26, 2019.      The history is provided by the patient and the EMS personnel.     Review of patient's allergies indicates:  No Known Allergies  Past Medical History:   Diagnosis Date    AICD (automatic cardioverter/defibrillator) present 08/05/2019    Alcoholic intoxication without complication     Anemia in stage 3 chronic kidney disease 1/18/2018    CKD stage 3 7/31/2019    Coronary artery disease     Coronary artery disease of native artery of native heart with stable angina pectoris 1/18/2018    Essential hypertension 1/17/2018    History of CVA (cerebrovascular accident) 1/18/2018    Ischemic cardiomyopathy 7/27/2019    MI (myocardial infarction)     2013, 2015, 2016, 2018 (total of 4 stents), triple CABG July 26, 2019    Mixed hyperlipidemia 1/17/2018    Nonsustained ventricular tachycardia 7/31/2019    Pneumonia of both lungs due to infectious organism      Past Surgical History:   Procedure Laterality Date    BYPASS  "GRAFT      CARDIAC DEFIBRILLATOR PLACEMENT Left 2019    CARDIAC SURGERY  2019    three vessel CABG    CORONARY ANGIOGRAPHY INCLUDING BYPASS GRAFTS WITH CATHETERIZATION OF LEFT HEART N/A 2019    Procedure: ANGIOGRAM, CORONARY, INCLUDING BYPASS GRAFT, WITH LEFT HEART CATHETERIZATION;  Surgeon: Chadwick Ramirez MD;  Location: [facility] CATH LAB;  Service: Cardiology;  Laterality: N/A;  rt groin, 5 fr, visipaque    CORONARY ANGIOPLASTY WITH STENT PLACEMENT  2018    4 stents    TRACHEOSTOMY CLOSURE       Family History   Problem Relation Age of Onset    Heart disease Mother     Hypertension Mother     Heart disease Father     Hypertension Father      Social History     Tobacco Use    Smoking status: Former Smoker     Types: Cigarettes     Last attempt to quit: 2019     Years since quittin.5    Smokeless tobacco: Never Used   Substance Use Topics    Alcohol use: Yes     Frequency: Never     Comment: beer on the weekends "barely"    Drug use: Yes     Types: Marijuana     Comment: "every now and then"     Review of Systems   Constitutional: Negative for fever.   HENT: Negative for rhinorrhea.    Eyes: Negative for redness.   Respiratory: Positive for cough. Negative for shortness of breath.    Cardiovascular: Positive for chest pain and leg swelling.   Gastrointestinal: Positive for vomiting. Negative for diarrhea.   Genitourinary: Negative for dysuria.   Musculoskeletal: Negative for back pain.   Skin: Negative for rash.   Neurological: Negative for headaches.   Psychiatric/Behavioral: Negative for confusion.       Physical Exam     Initial Vitals [20 0907]   BP Pulse Resp Temp SpO2   (!) 152/106 88 (!) 22 97.5 °F (36.4 °C) 98 %      MAP       --       HTN noted    Physical Exam    Nursing note and vitals reviewed.  Constitutional: He appears well-developed and well-nourished. He is not diaphoretic. He appears distressed.   HENT:   Head: Normocephalic and atraumatic.   Eyes: " Conjunctivae and EOM are normal. No scleral icterus.   Neck: Normal range of motion. Neck supple. Hepatojugular reflux and JVD present.   Cardiovascular: Regular rhythm. Exam reveals gallop.    No murmur heard.  Occasional ectopic beat   Pulmonary/Chest: Tachypnea noted. No respiratory distress.   Abdominal: Soft. He exhibits no distension.   Musculoskeletal: Normal range of motion. He exhibits edema (Peripheral).        Right lower leg: He exhibits edema (mild chronic appearing).        Left lower leg: He exhibits edema (mild chronic appearing).   Neurological: He is alert and oriented to person, place, and time. GCS score is 15. GCS eye subscore is 4. GCS verbal subscore is 5. GCS motor subscore is 6.   Skin: Skin is warm and dry. Capillary refill takes less than 2 seconds. No rash noted.   Psychiatric: He has a normal mood and affect. His behavior is normal.         ED Course   Procedures  Labs Reviewed   CBC W/ AUTO DIFFERENTIAL - Abnormal; Notable for the following components:       Result Value    Hemoglobin 11.2 (*)     Hematocrit 36.9 (*)     Mean Corpuscular Volume 78 (*)     Mean Corpuscular Hemoglobin 23.8 (*)     Mean Corpuscular Hemoglobin Conc 30.4 (*)     RDW 23.1 (*)     Mono% 15.6 (*)     All other components within normal limits   COMPREHENSIVE METABOLIC PANEL - Abnormal; Notable for the following components:    CO2 21 (*)     BUN, Bld 22 (*)     Creatinine 1.6 (*)     Total Bilirubin 4.0 (*)     Alkaline Phosphatase 146 (*)     Anion Gap 18 (*)     eGFR if  55 (*)     eGFR if non  47 (*)     All other components within normal limits   TROPONIN I - Abnormal; Notable for the following components:    Troponin I 1.100 (*)     All other components within normal limits   B-TYPE NATRIURETIC PEPTIDE - Abnormal; Notable for the following components:    BNP 3,693 (*)     All other components within normal limits   PROTIME-INR - Abnormal; Notable for the following components:     Prothrombin Time 13.4 (*)     INR 1.3 (*)     All other components within normal limits   LIPASE   LIPASE        ECG Results          EKG 12-lead (In process)  Result time 01/01/20 14:54:53    In process by Interface, Lab In [facility] (01/01/20 14:54:53)                 Narrative:    Test Reason : R06.02,    Vent. Rate : 082 BPM     Atrial Rate : 082 BPM     P-R Int : 154 ms          QRS Dur : 110 ms      QT Int : 406 ms       P-R-T Axes : 083 119 -31 degrees     QTc Int : 474 ms    Sinus rhythm with occasional Premature ventricular complexes  Left posterior fascicular block  Possible Inferior infarct (cited on or before 30-OCT-2019)  T wave abnormality, consider lateral ischemia  Abnormal ECG  When compared with ECG of 30-OCT-2019 18:46,  Significant changes have occurred    Referred By: [email or web address]   SELF           Confirmed By:                             Imaging Results          US Abdomen Limited (In process)                X-Ray Chest AP Portable (Final result)  Result time 01/01/20 09:29:31    Final result by Aneesh Beavers MD (01/01/20 09:29:31)                 Impression:      As above      Electronically signed by: Aneesh Beavers MD  Date:    01/01/2020  Time:    09:29             Narrative:    EXAMINATION:  XR CHEST AP PORTABLE    CLINICAL HISTORY:  CHF;    TECHNIQUE:  Single frontal view of the chest was performed.    COMPARISON:  10/30/2019    FINDINGS:  Cardiac silhouette remains enlarged.  Sternotomy wires and AICD are again noted.    Pulmonary vasculature appears mildly congested and similar to prior.  Hazy increased density noted in the right lung base, concerning for possible layering pleural effusion with probable associated atelectasis.  No pneumothorax visualized.                                 Medical Decision Making:   History:   Old Medical Records: I decided to obtain old medical records.  Initial Assessment:   54 year old male patient brought to the ED by EMS complaining of leg  swelling for the past 3 days. The patient also reports cough, chest pain that is worse with cough, and emesis. I will order lab work and a chest x-ray. I will reassess the patient once I have reviewed the results.  Independently Interpreted Test(s):   I have ordered and independently interpreted EKG Reading(s) - see summary below  Clinical Tests:   Lab Tests: Ordered and Reviewed  Radiological Study: Reviewed and Ordered  Medical Tests: Ordered and Reviewed            Scribe Attestation:   Scribe #1: I performed the above scribed service and the documentation accurately describes the services I performed. I attest to the accuracy of the note.            ED Course as of Jan 01 1457   Wed Jan 01, 2020   0914 MyIndependent interpretation of the EKG is normal sinus rhythm at 82 beats per minute with occasional PVC.  There is nonspecific flipped Ts in the anterior lateral leads and small Q-waves in the inferior leads.  There is a left anterior fascicular block which appears new.  There is no evidence of ST segment elevation infarct.   EKG 12-lead [MH]   1211 BNP elevated at 3600    [MH]   1212 Mild anemia at 11:36 a.m..  No leukocytosis.  Platelets normal.    [MH]   1212 Troponin is 1.1 which is higher than in the past    [MH]   1213 CKD on CMP appears chronic    [MH]   1213 Bilirubin elevated at 4.  This is new    [MH]   1348 Discussed with Dr. Mccormick, ICU    [MH]   1440 BP remains elevated after enalapril.  Will start a nicardipine drip    [MH]      ED Course User Index  [MH] Rafy Flores MD            I attest that I personally performed the services documented by the scribe and acknowledged and confirm the content of the note.   Nurses notes were reviewed.  Rafy Flores          Clinical Impression:       ICD-10-CM ICD-9-CM   1. Congestive heart failure, unspecified HF chronicity, unspecified heart failure type I50.9 428.0   2. Shortness of breath R06.02 786.05   3. NSTEMI (non-ST elevated myocardial  infarction) I21.4 410.70   4. Hypertensive emergency without congestive heart failure I16.1 401.9               1:06 PM  Discussed with Dr. Mccarty he will follow the patient as an inpatient      Paged Dr. Mccormick, ICU (1300)    2:58 PM  The medical management of Abdoul Ochoa has been transferred to the admitting team. Total critical care time provided by me was: 60 minutes and included Coordination of care with other physicians  Review and interpretation of radiologic studies with re-assessment of plan of care  Review and interpretation of laboratory studies with re-assessment of plan of care  Review of diagnosis, treatment options and prognosis with patient.  At this time, the patient's condition is unchanged, and this was relayed to the accepting team.  Please see notes from the admitting team for continued care.  Rafy Flores 2:58 PM             Rafy Flores MD  01/01/20 1223       Rafy Flores MD  01/01/20 1307       Rafy Flores MD  01/01/20 1457       Rafy Flores MD  01/01/20 1458

## 2020-01-02 PROBLEM — R07.9 ACUTE CHEST PAIN: Status: RESOLVED | Noted: 2019-01-01 | Resolved: 2020-01-01

## 2020-01-02 NOTE — PLAN OF CARE
"   01/02/20 1133   Discharge Assessment   Assessment Type Discharge Planning Assessment   Confirmed/corrected address and phone number on facesheet? Yes   Assessment information obtained from? Patient   Prior to hospitilization cognitive status: Alert/Oriented   Prior to hospitalization functional status: Independent   Current cognitive status: Alert/Oriented   Current Functional Status: Independent   Facility Arrived From: home   Lives With sibling(s)   Able to Return to Prior Arrangements yes   Is patient able to care for self after discharge? Yes   Patient's perception of discharge disposition home or selfcare   Readmission Within the Last 30 Days no previous admission in last 30 days   Patient currently being followed by outpatient case management? No   Patient currently receives any other outside agency services? No   Equipment Currently Used at Home none   Do you have any problems affording any of your prescribed medications? No   Is the patient taking medications as prescribed? yes   Does the patient have transportation home? Yes   Transportation Anticipated public transportation   Dialysis Name and Scheduled days N/A   Does the patient receive services at the [facility]? No   Discharge Plan A Home with family   Discharge Plan B Home with family   DME Needed Upon Discharge  none   Patient/Family in Agreement with Plan yes       SAKSHI met with pt and pt's family at bedside. SW explained her role in Care Management. Pt voiced understanding. SW inquired about HELP AT HOME. Pt stated that he will have [place] at home to help for support. SW voiced understanding. SW inquired about responsibilities when it comes to  MANAGING HER/HIS HEALTH at home and what it entails. Pt inquired about details. SW informed pt of RESPONSIBILITIES of:    1. Follow up appointments  2. Getting Prescriptions filled  3. Taking medications as prescribed.     Pt voiced understanding.  SW explained "My Health Packet" blue folder and the " pink and green tabs that are on the folder as well. Discharge Brochure given to pt with Care Team information. Pt voiced understanding.     Pt's pharmacy:   [id number] - [place] - [facility] [address] AT [facility] OF [address] [facility]  [address] [facility] [place] [address]  Phone: [phone] Fax: [phone]      Pt's preference for appointments:

## 2020-01-02 NOTE — SUBJECTIVE & OBJECTIVE
Past Medical History:   Diagnosis Date    AICD (automatic cardioverter/defibrillator) present 08/05/2019    Alcoholic intoxication without complication     Anemia in stage 3 chronic kidney disease 1/18/2018    CKD stage 3 7/31/2019    Coronary artery disease     Coronary artery disease of native artery of native heart with stable angina pectoris 1/18/2018    Essential hypertension 1/17/2018    History of CVA (cerebrovascular accident) 1/18/2018    Ischemic cardiomyopathy 7/27/2019    MI (myocardial infarction)     2013, 2015, 2016, 2018 (total of 4 stents), triple CABG July 26, 2019    Mixed hyperlipidemia 1/17/2018    Nonsustained ventricular tachycardia 7/31/2019    Pneumonia of both lungs due to infectious organism        Past Surgical History:   Procedure Laterality Date    BYPASS GRAFT      CARDIAC DEFIBRILLATOR PLACEMENT Left 08/05/2019    CARDIAC SURGERY  07/26/2019    three vessel CABG    CORONARY ANGIOGRAPHY INCLUDING BYPASS GRAFTS WITH CATHETERIZATION OF LEFT HEART N/A 7/26/2019    Procedure: ANGIOGRAM, CORONARY, INCLUDING BYPASS GRAFT, WITH LEFT HEART CATHETERIZATION;  Surgeon: Chadwick Ramirez MD;  Location: [facility] CATH LAB;  Service: Cardiology;  Laterality: N/A;  rt groin, 5 fr, visipaque    CORONARY ANGIOPLASTY WITH STENT PLACEMENT  01/18/2018    4 stents    TRACHEOSTOMY CLOSURE  2014       Review of patient's allergies indicates:  No Known Allergies    No current facility-administered medications on file prior to encounter.      Current Outpatient Medications on File Prior to Encounter   Medication Sig    amiodarone (PACERONE) 400 MG tablet Take 1 tablet (400 mg total) by mouth once daily.    aspirin 81 MG Chew Take 81 mg by mouth once daily.    furosemide (LASIX) 40 MG tablet Take 1 tablet (40 mg total) by mouth 2 (two) times daily.    atorvastatin (LIPITOR) 80 MG tablet Take 1 tablet (80 mg total) by mouth once daily.    carvedilol (COREG) 25 MG tablet Take 1 tablet (25 mg  "total) by mouth 2 (two) times daily.    clopidogrel (PLAVIX) 75 mg tablet Take 1 tablet (75 mg total) by mouth once daily.    isosorbide mononitrate (IMDUR) 120 MG 24 hr tablet Take 1 tablet (120 mg total) by mouth once daily.    lisinopril (PRINIVIL,ZESTRIL) 40 MG tablet Take 1 tablet (40 mg total) by mouth once daily.    nitroGLYCERIN (NITROSTAT) 0.4 MG SL tablet Place 1 tablet (0.4 mg total) under the tongue every 5 (five) minutes as needed for Chest pain.    spironolactone (ALDACTONE) 25 MG tablet Take 1 tablet (25 mg total) by mouth once daily.     Family History     Problem Relation (Age of Onset)    Heart disease Mother, Father    Hypertension Mother, Father        Tobacco Use    Smoking status: Former Smoker     Types: Cigarettes     Last attempt to quit: 2019     Years since quittin.5    Smokeless tobacco: Never Used   Substance and Sexual Activity    Alcohol use: Yes     Frequency: Never     Comment: beer on the weekends "barely"    Drug use: Yes     Types: Marijuana     Comment: "every now and then"    Sexual activity: Yes     Partners: Female     Review of Systems   Constitutional: Negative.    HENT: Negative.    Eyes: Negative.    Respiratory: Positive for cough and shortness of breath.    Cardiovascular: Positive for chest pain.   Gastrointestinal: Negative.    Endocrine: Negative.    Genitourinary: Negative.    Neurological: Positive for weakness.   Psychiatric/Behavioral: Negative.      Objective:     Vital Signs (Most Recent):  Temp: 98.4 °F (36.9 °C) (20 1600)  Pulse: 87 (20)  Resp: 20 (20)  BP: 128/60 (20)  SpO2: 98 % (20) Vital Signs (24h Range):  Temp:  [97.5 °F (36.4 °C)-98.4 °F (36.9 °C)] 98.4 °F (36.9 °C)  Pulse:  [68-88] 87  Resp:  [12-28] 20  SpO2:  [94 %-100 %] 98 %  BP: (120-197)/() 128/60     Weight: 77.1 kg (170 lb)  Body mass index is 27.44 kg/m².    Physical Exam   Constitutional: He is oriented to person, " place, and time. He appears well-developed and well-nourished.   HENT:   Head: Normocephalic and atraumatic.   Mouth/Throat: Oropharynx is clear and moist.   Eyes: Pupils are equal, round, and reactive to light. EOM are normal.   Neck: Normal range of motion. Neck supple.   Cardiovascular: Normal rate, regular rhythm and intact distal pulses.   Murmur heard.  Pulmonary/Chest: Effort normal and breath sounds normal. No respiratory distress. He has no wheezes.   Abdominal: Soft. Bowel sounds are normal. He exhibits no distension.   Musculoskeletal: Normal range of motion. He exhibits no edema.   Neurological: He is alert and oriented to person, place, and time.   Skin: Skin is warm and dry. Capillary refill takes 2 to 3 seconds.   Psychiatric: He has a normal mood and affect.         CRANIAL NERVES     CN III, IV, VI   Pupils are equal, round, and reactive to light.  Extraocular motions are normal.        Significant Labs:   A1C:   Recent Labs   Lab 10/29/19  0420   HGBA1C 6.1*     Bilirubin:   Recent Labs   Lab 01/01/20  0925   BILITOT 4.0*     CBC:   Recent Labs   Lab 01/01/20  0925   WBC 5.91   HGB 11.2*   HCT 36.9*        CMP:   Recent Labs   Lab 01/01/20  0925      K 3.5      CO2 21*   GLU 83   BUN 22*   CREATININE 1.6*   CALCIUM 10.3   PROT 7.4   ALBUMIN 3.9   BILITOT 4.0*   ALKPHOS 146*   AST 35   ALT 31   ANIONGAP 18*   EGFRNONAA 47*     Cardiac Markers:   Recent Labs   Lab 01/01/20  0925   BNP 3,693*     Coagulation:   Recent Labs   Lab 01/01/20  0925   INR 1.3*     Lipase:   Recent Labs   Lab 01/01/20  0925   LIPASE 15     Lipid Panel: No results for input(s): CHOL, HDL, LDLCALC, TRIG, CHOLHDL in the last 48 hours.  Magnesium: No results for input(s): MG in the last 48 hours.  POCT Glucose: No results for input(s): POCTGLUCOSE in the last 48 hours.  Troponin:   Recent Labs   Lab 01/01/20  0925 01/01/20  1720   TROPONINI 1.100* 1.107*     TSH: No results for input(s): TSH in the last 4320  hours.  Urine Studies: No results for input(s): COLORU, APPEARANCEUA, PHUR, SPECGRAV, PROTEINUA, GLUCUA, KETONESU, BILIRUBINUA, OCCULTUA, NITRITE, UROBILINOGEN, LEUKOCYTESUR, RBCUA, WBCUA, BACTERIA, SQUAMEPITHEL, HYALINECASTS in the last 48 hours.    Invalid input(s): ONESIMO    Significant Imaging: I have reviewed and interpreted all pertinent imaging results/findings within the past 24 hours.

## 2020-01-02 NOTE — CARE UPDATE
[facility]  ICU Multidisciplinary Bedside Rounds   SUMMARY     Date: 1/2/2020    Prehospitalization: Home  Admit Date / LOS : 1/1/2020/ 1 days    Diagnosis: Hypertensive emergency without congestive heart failure    Consults:        Active: Cardio       Needed: N/A     Code Status: Full Code   Advanced Directive: <no information>    LDA: PIV       Central Lines/Site/Justification:Patient Does Not Have Central Line       Urinary Cath/Order/Justification:Patient Does Not Have Urinary Catheter    Vasopressors/Infusions:    niCARdipine Stopped (01/01/20 2120)    nitroGLYCERIN 60 mcg/min (01/02/20 0600)          GOALS: Volume/ Hemodynamic: SBP <                     RASS: N/A    CAM ICU: Negative  Pain Management: IV       Pain Controlled: yes     Rhythm: NSR    Respiratory Device: Nasal Cannula                  Most Recent SBT/ SAT: N/A         VTE Prophylaxis: Pharm  Mobility: Bedrest  Stress Ulcer Prophylaxis: No    Dietary: PO  Tolerance: yes      Isolation: No active isolations    Restraints: No    Significant Dates:  Post Op Date: N/A  Rescue Date: N/A  Imaging/ Diagnostics: N/A    Noteworthy Labs:  K 3.1, replacement ordered    CBC/Anemia Labs: Coags:    Recent Labs   Lab 01/01/20  0925 01/02/20  0335   WBC 5.91 5.89   HGB 11.2* 10.8*   HCT 36.9* 35.3*    279   MCV 78* 79*   RDW 23.1* 22.6*    Recent Labs   Lab 01/01/20  0925   INR 1.3*        Chemistries:   Recent Labs   Lab 01/01/20  0925 01/02/20  0335    140   K 3.5 3.1*    102   CO2 21* 30*   BUN 22* 23*   CREATININE 1.6* 1.5*   CALCIUM 10.3 9.1   PROT 7.4 6.1   BILITOT 4.0* 3.5*   ALKPHOS 146* 122   ALT 31 28   AST 35 28        Cardiac Enzymes: Ejection Fractions:    Recent Labs     01/01/20  0925 01/01/20  1720 01/01/20  2315   TROPONINI 1.100* 1.107* 0.994*    Nuc Stress EF   Date Value Ref Range Status   10/29/2019 24 % Final        POCT Glucose: HbA1c:    No results for input(s): POCTGLUCOSE in the last 168 hours.  Hemoglobin A1C   Date Value Ref Range Status   10/29/2019 6.1 (H) 4.0 - 5.6 % Final     Comment:     ADA Screening Guidelines:  5.7-6.4%  Consistent with prediabetes  >or=6.5%  Consistent with diabetes  High levels of fetal hemoglobin interfere with the HbA1C  assay. Heterozygous hemoglobin variants (HbS, HgC, etc)do  not significantly interfere with this assay.   However, presence of multiple variants may affect accuracy.     01/18/2018 5.3 4.0 - 5.6 % Final     Comment:     According to ADA guidelines, hemoglobin A1c <7.0% represents  optimal control in non-pregnant diabetic patients. Different  metrics may apply to specific patient populations.   Standards of Medical Care in Diabetes-2016.  For the purpose of screening for the presence of diabetes:  <5.7%     Consistent with the absence of diabetes  5.7-6.4%  Consistent with increasing risk for diabetes   (prediabetes)  >or=6.5%  Consistent with diabetes  Currently, no consensus exists for use of hemoglobin A1c  for diagnosis of diabetes for children.  This Hemoglobin A1c assay has significant interference with fetal   hemoglobin   (HbF). The results are invalid for patients with abnormal amounts of   HbF,   including those with known Hereditary Persistence   of Fetal Hemoglobin. Heterozygous hemoglobin variants (HbAS, HbAC,   HbAD, HbAE, HbA2) do not significantly interfere with this assay;   however, presence of multiple variants in a sample may impact the %   interference.          Needs from Care Team:  PO anti-HTN meds and PRN nitroglycerin SL     ICU LOS 14h  Level of Care: Critical Care

## 2020-01-02 NOTE — PROGRESS NOTES
[facility] Hospital Medicine  Progress Note    Patient Name: Abdoul Ochoa  MRN: [id number]  Patient Class: IP- Inpatient   Admission Date: 1/1/2020  Length of Stay: 1 days  Attending Physician: Mamie Mccormick MD  Primary Care Provider: Primary Doctor No        Subjective:     Principal Problem:Hypertensive emergency without congestive heart failure        HPI:  54 year old male patient brought to the ED by EMS complaining of leg swelling for the past 3 days. The patient also reports cough, chest pain that is worse with cough, and emesis. He denies any fever, shortness of breath, or diarrhea. Per EMS, the patient has a past medical history of Congestive Heart Failure. EMS also reports that the patient has recently had some changes with his regular medications. The patient has significant history of my card up the, MI 2013, 2015, 2016, 2018 (total of 4 stents), triple CABG July 26, 2019.    Pt admitted with hypertensive emergency on cardene infusion and chest pain. Cardiology consulted.       Overview/Hospital Course:  Pt admitted with chest pain and hypertensive urgency. Weaned off cardene and then started on tridil infusion last night. Pt chest pain improved. BP improved. tridil off. Cardiology consulted. No further ischemic workup indicated. Transfer to tele.     Resumed home meds.     Interval History: chest pain improved    Review of Systems   Constitutional: Negative.    HENT: Negative.    Eyes: Negative.    Respiratory: Positive for cough and shortness of breath.    Cardiovascular: Positive for chest pain.   Gastrointestinal: Negative.    Endocrine: Negative.    Genitourinary: Negative.    Neurological: Positive for weakness.   Psychiatric/Behavioral: Negative.      Objective:     Vital Signs (Most Recent):  Temp: 97.8 °F (36.6 °C) (01/02/20 1515)  Pulse: 73 (01/02/20 1600)  Resp: 20 (01/02/20 1600)  BP: (!) 158/100 (01/02/20 1600)  SpO2: 98 % (01/02/20 1600) Vital Signs (24h Range):  Temp:   [97.5 °F (36.4 °C)-98.3 °F (36.8 °C)] 97.8 °F (36.6 °C)  Pulse:  [63-87] 73  Resp:  [12-33] 20  SpO2:  [94 %-100 %] 98 %  BP: (117-173)/() 158/100     Weight: 77.1 kg (170 lb)  Body mass index is 27.44 kg/m².    Intake/Output Summary (Last 24 hours) at 1/2/2020 1651  Last data filed at 1/2/2020 1600  Gross per 24 hour   Intake 602.03 ml   Output 800 ml   Net -197.97 ml      Physical Exam   Constitutional: He is oriented to person, place, and time. He appears well-developed and well-nourished.   HENT:   Head: Normocephalic and atraumatic.   Mouth/Throat: Oropharynx is clear and moist.   Eyes: Pupils are equal, round, and reactive to light. EOM are normal.   Neck: Normal range of motion. Neck supple.   Cardiovascular: Normal rate, regular rhythm and intact distal pulses.   Murmur heard.  Pulmonary/Chest: Effort normal and breath sounds normal. No respiratory distress. He has no wheezes.   Abdominal: Soft. Bowel sounds are normal. He exhibits no distension.   Musculoskeletal: Normal range of motion. He exhibits no edema.   Neurological: He is alert and oriented to person, place, and time.   Skin: Skin is warm and dry. Capillary refill takes 2 to 3 seconds.   Psychiatric: He has a normal mood and affect.       Significant Labs: All pertinent labs within the past 24 hours have been reviewed.    Significant Imaging: I have reviewed and interpreted all pertinent imaging results/findings within the past 24 hours.      Assessment/Plan:      * Hypertensive emergency without congestive heart failure    cardene infusion weaned off  tridil off  Resume home meds    Chronic combined systolic and diastolic congestive heart failure  No evidence of decompensation  Cont home meds     ICD (implantable cardioverter-defibrillator), single, in situ  No acute issues      Anemia in stage 3 chronic kidney disease    Stable     Coronary artery disease involving native coronary artery of native heart    See above      Tobacco  abuse  Counseled on smoking cessation >5 min  Nicotine patch       Essential hypertension  cardene infusion  Resume home meds         VTE Risk Mitigation (From admission, onward)         Ordered     enoxaparin injection 30 mg  Daily      01/01/20 1845     IP VTE HIGH RISK PATIENT  Once      01/01/20 1706     Place CHAPITO hose  Until discontinued      01/01/20 1706                Critical care time spent on the evaluation and treatment of severe organ dysfunction, review of pertinent labs and imaging studies, discussions with consulting providers and discussions with patient/family: 30 minutes.      Mamie Mccormick MD  Department of Hospital Medicine   [facility]

## 2020-01-02 NOTE — SUBJECTIVE & OBJECTIVE
Past Medical History:   Diagnosis Date    AICD (automatic cardioverter/defibrillator) present 08/05/2019    Alcoholic intoxication without complication     Anemia in stage 3 chronic kidney disease 1/18/2018    CKD stage 3 7/31/2019    Coronary artery disease     Coronary artery disease of native artery of native heart with stable angina pectoris 1/18/2018    Essential hypertension 1/17/2018    History of CVA (cerebrovascular accident) 1/18/2018    Ischemic cardiomyopathy 7/27/2019    MI (myocardial infarction)     2013, 2015, 2016, 2018 (total of 4 stents), triple CABG July 26, 2019    Mixed hyperlipidemia 1/17/2018    Nonsustained ventricular tachycardia 7/31/2019    Pneumonia of both lungs due to infectious organism        Past Surgical History:   Procedure Laterality Date    BYPASS GRAFT      CARDIAC DEFIBRILLATOR PLACEMENT Left 08/05/2019    CARDIAC SURGERY  07/26/2019    three vessel CABG    CORONARY ANGIOGRAPHY INCLUDING BYPASS GRAFTS WITH CATHETERIZATION OF LEFT HEART N/A 7/26/2019    Procedure: ANGIOGRAM, CORONARY, INCLUDING BYPASS GRAFT, WITH LEFT HEART CATHETERIZATION;  Surgeon: Chadwick Ramirez MD;  Location: [facility] CATH LAB;  Service: Cardiology;  Laterality: N/A;  rt groin, 5 fr, visipaque    CORONARY ANGIOPLASTY WITH STENT PLACEMENT  01/18/2018    4 stents    TRACHEOSTOMY CLOSURE  2014       Review of patient's allergies indicates:  No Known Allergies    No current facility-administered medications on file prior to encounter.      Current Outpatient Medications on File Prior to Encounter   Medication Sig    amiodarone (PACERONE) 400 MG tablet Take 1 tablet (400 mg total) by mouth once daily.    aspirin 81 MG Chew Take 81 mg by mouth once daily.    furosemide (LASIX) 40 MG tablet Take 1 tablet (40 mg total) by mouth 2 (two) times daily.    atorvastatin (LIPITOR) 80 MG tablet Take 1 tablet (80 mg total) by mouth once daily.    carvedilol (COREG) 25 MG tablet Take 1 tablet (25 mg  "total) by mouth 2 (two) times daily.    clopidogrel (PLAVIX) 75 mg tablet Take 1 tablet (75 mg total) by mouth once daily.    isosorbide mononitrate (IMDUR) 120 MG 24 hr tablet Take 1 tablet (120 mg total) by mouth once daily.    lisinopril (PRINIVIL,ZESTRIL) 40 MG tablet Take 1 tablet (40 mg total) by mouth once daily.    nitroGLYCERIN (NITROSTAT) 0.4 MG SL tablet Place 1 tablet (0.4 mg total) under the tongue every 5 (five) minutes as needed for Chest pain.    spironolactone (ALDACTONE) 25 MG tablet Take 1 tablet (25 mg total) by mouth once daily.     Family History     Problem Relation (Age of Onset)    Heart disease Mother, Father    Hypertension Mother, Father        Tobacco Use    Smoking status: Former Smoker     Types: Cigarettes     Last attempt to quit: 2019     Years since quittin.5    Smokeless tobacco: Never Used   Substance and Sexual Activity    Alcohol use: Yes     Frequency: Never     Comment: beer on the weekends "barely"    Drug use: Yes     Types: Marijuana     Comment: "every now and then"    Sexual activity: Yes     Partners: Female     Review of Systems   Constitution: Negative for decreased appetite.   HENT: Negative for ear discharge.    Eyes: Negative for blurred vision.   Endocrine: Negative for polyphagia.   Skin: Negative for nail changes.   Genitourinary: Negative for bladder incontinence.   Neurological: Negative for aphonia.   Psychiatric/Behavioral: Negative for hallucinations.   Allergic/Immunologic: Negative for hives.     Objective:     Vital Signs (Most Recent):  Temp: 97.7 °F (36.5 °C) (20 0715)  Pulse: 77 (20)  Resp: 20 (20)  BP: 136/77 (20)  SpO2: 97 % (20) Vital Signs (24h Range):  Temp:  [97.5 °F (36.4 °C)-98.4 °F (36.9 °C)] 97.7 °F (36.5 °C)  Pulse:  [63-87] 77  Resp:  [12-33] 20  SpO2:  [94 %-100 %] 97 %  BP: (118-197)/() 136/77     Weight: 77.1 kg (170 lb)  Body mass index is 27.44 kg/m².    SpO2: " 97 %  O2 Device (Oxygen Therapy): nasal cannula      Intake/Output Summary (Last 24 hours) at 1/2/2020 0929  Last data filed at 1/2/2020 0800  Gross per 24 hour   Intake 556.13 ml   Output 2950 ml   Net -2393.87 ml       Lines/Drains/Airways     Peripheral Intravenous Line                 Peripheral IV - Single Lumen 01/01/20 0925 20 G Right Hand 1 day         Peripheral IV - Single Lumen 01/01/20 20 G Left Forearm 1 day                Physical Exam   Constitutional: He is oriented to person, place, and time. He appears well-developed and well-nourished.   HENT:   Head: Normocephalic and atraumatic.   Eyes: Pupils are equal, round, and reactive to light. Conjunctivae are normal.   Neck: Normal range of motion. Neck supple.   Cardiovascular: Normal rate, normal heart sounds and intact distal pulses.   Pulmonary/Chest: Effort normal and breath sounds normal.   Abdominal: Soft. Bowel sounds are normal.   Musculoskeletal: Normal range of motion.   Neurological: He is alert and oriented to person, place, and time.   Skin: Skin is warm and dry.       Significant Labs: All pertinent lab results from the last 24 hours have been reviewed.    Significant Imaging: Echocardiogram:   2D echo with color flow doppler:   Results for orders placed or performed during the hospital encounter of 01/17/18   2D echo with color flow doppler   Result Value Ref Range    QEF 20 (A) 55 - 65    Mitral Valve Regurgitation MODERATE TO SEVERE (A)     Est. PA Systolic Pressure 45.45 (A)     Pericardial Effusion NONE     Tricuspid Valve Regurgitation MILD TO MODERATE     Narrative    Date of Procedure: 01/17/2018        TEST DESCRIPTION   Technical Quality: This is a technically adequate study.     Aorta: The aortic root is normal in size, measuring 2.8 cm at sinotubular junction and 3.1 cm at Sinuses of Valsalva. The proximal ascending aorta is normal in size, measuring 2.6 cm across.     Left Atrium: The left atrial volume index is mildly  enlarged, measuring 37.41 cc/m2.     Left Ventricle: The left ventricle is normal in size, with an end-diastolic diameter of 5.4 cm, and an end-systolic diameter of 4.8 cm. LV wall thickness is normal, with the septum measuring 1.2 cm and the posterior wall measuring 1.0 cm across. Relative   wall thickness was normal at 0.37, and the LV mass index was increased at 150.9 g/m2 consistent with eccentric left ventricular hypertrophy. There are no regional wall motion abnormalities. Left ventricular systolic function appears severely depressed.   Visually estimated ejection fraction is 20-25%. The LV Doppler derived stroke volume equals 29.0 ccs.         Right Atrium: The right atrium is normal in size, measuring 4.7 cm in length and 4.5 cm in width in the apical view.     Right Ventricle: The right ventricle is normal in size measuring 4.0 cm at the base in the apical right ventricle-focused view. Global right ventricular systolic function appears severely depressed. Tricuspid annular plane systolic excursion (TAPSE) is   .8 cm. Tissue Doppler-derived tricuspid annular peak systolic velocity (S prime) is 6.1 cm/s. The estimated PA systolic pressure is 45 mmHg.     Aortic Valve:  The aortic valve is tri-leaflet in structure.     Mitral Valve:  There is moderate to severe mitral regurgitation.     Tricuspid Valve:  There is mild to moderate tricuspid regurgitation.     Pericardium: There is no evidence of pericardial effusion.      IVC: IVC is enlarged but collapses > 50% with a sniff, suggesting intermediate right atrial pressure of 8 mmHg.     Intracavitary: There is no evidence of intracavity mass, thrombi, or vegetation.         CONCLUSIONS     1 - Severely depressed left ventricular systolic function (EF 20-25%).     2 - Eccentric hypertrophy.     3 - Severely depressed right ventricular systolic function .     4 - Pulmonary hypertension. The estimated PA systolic pressure is 45 mmHg.     5 - Moderate to severe  mitral regurgitation.     6 - Intermediate central venous pressure.             This document has been electronically    SIGNED BY: Charlie Laureano MD On: 01/17/2018 11:56

## 2020-01-02 NOTE — SUBJECTIVE & OBJECTIVE
Interval History: chest pain improved    Review of Systems   Constitutional: Negative.    HENT: Negative.    Eyes: Negative.    Respiratory: Positive for cough and shortness of breath.    Cardiovascular: Positive for chest pain.   Gastrointestinal: Negative.    Endocrine: Negative.    Genitourinary: Negative.    Neurological: Positive for weakness.   Psychiatric/Behavioral: Negative.      Objective:     Vital Signs (Most Recent):  Temp: 97.8 °F (36.6 °C) (01/02/20 1515)  Pulse: 73 (01/02/20 1600)  Resp: 20 (01/02/20 1600)  BP: (!) 158/100 (01/02/20 1600)  SpO2: 98 % (01/02/20 1600) Vital Signs (24h Range):  Temp:  [97.5 °F (36.4 °C)-98.3 °F (36.8 °C)] 97.8 °F (36.6 °C)  Pulse:  [63-87] 73  Resp:  [12-33] 20  SpO2:  [94 %-100 %] 98 %  BP: (117-173)/() 158/100     Weight: 77.1 kg (170 lb)  Body mass index is 27.44 kg/m².    Intake/Output Summary (Last 24 hours) at 1/2/2020 1651  Last data filed at 1/2/2020 1600  Gross per 24 hour   Intake 602.03 ml   Output 800 ml   Net -197.97 ml      Physical Exam   Constitutional: He is oriented to person, place, and time. He appears well-developed and well-nourished.   HENT:   Head: Normocephalic and atraumatic.   Mouth/Throat: Oropharynx is clear and moist.   Eyes: Pupils are equal, round, and reactive to light. EOM are normal.   Neck: Normal range of motion. Neck supple.   Cardiovascular: Normal rate, regular rhythm and intact distal pulses.   Murmur heard.  Pulmonary/Chest: Effort normal and breath sounds normal. No respiratory distress. He has no wheezes.   Abdominal: Soft. Bowel sounds are normal. He exhibits no distension.   Musculoskeletal: Normal range of motion. He exhibits no edema.   Neurological: He is alert and oriented to person, place, and time.   Skin: Skin is warm and dry. Capillary refill takes 2 to 3 seconds.   Psychiatric: He has a normal mood and affect.       Significant Labs: All pertinent labs within the past 24 hours have been  reviewed.    Significant Imaging: I have reviewed and interpreted all pertinent imaging results/findings within the past 24 hours.

## 2020-01-02 NOTE — EICU
EICU    Pt is a 53 yo m with pmhx sig for chf s/p CABG 7/19, multiple stents admitted with 3d le swelling with bp peak 197/129 txed with enalapril and then switched to cardene gtt    Video monitor 159/90 95% 2L NC hr 75 sinus rr 12 t 36.4  Initially pt sleepoing comfortably  nonlabored breathing        Wbc 5.9 P 60 L 20 M 16  hgb 11.2 mcv 78  plt 314      Na 141 K 3.5 HCO3 21 ag 18 bun 22 creat 1.6 gluc 83 Ca 10.3  ast 35 alt 31 alkl phos 146 tb 4 tp 7.4 alb 3.9    bnp 3693 trop 1.1--> 1.1    cxr sternotomty wires, aicd, enlarged card silhouette densit right base likely pleural fluid +/- opacificaton  ekg sr RAD LPFB t wave inversion v3-v6    A/P  Overinite pt with chest discomfort, nursing spoke to cardiology on call and pt started on nitro gtt with relief of his chest discomfort.  Nurse says pt feeling much better, no other overnigth issues  - cont current care  - card to follow in am  - hd stable  - sao2 ok nonlabored breathing  - creat down to 1.5 this am  - will replete potassium, check magnesium

## 2020-01-02 NOTE — HOSPITAL COURSE
Mr. Ochoa was admitted with chest pain and hypertensive urgency/emergency to the ICU. Treatment was initiated with cardene gtt which was then changed to tridil infusion. Pt chest pain improved. BP improved. He was resumed on his home blood pressure medications and tridil gtt was weaned off. Cardiology consulted. Elevated troponins were trended flat and not consistent with ACS, and was secondary to HTN urgency. No further ischemic workup indicated as per Cardiology. Hospitalization all secondary to non-compliance with medications. He was stepped down to floor on 1/2/20. Monitored overnight on telemetry and no ectopy noted. Blood pressures were well controlled on PO regimen with SBP in the 130s. He was educated on need for compliance with all of his medications and new prescriptions for all of his medications were sent to his pharmacy. Follow up arranged.

## 2020-01-02 NOTE — HPI
HPI: 54 year old male patient brought to the ED by EMS complaining of leg swelling for the past 3 days. The patient also reports cough, chest pain that is worse with cough, and emesis. He denies any fever, shortness of breath, or diarrhea. Per EMS, the patient has a past medical history of Congestive Heart Failure. EMS also reports that the patient has recently had some changes with his regular medications. The patient has significant history of my card up the, MI 2013, 2015, 2016, 2018 (total of 4 stents), triple CABG July 26, 2019.     Pt admitted with hypertensive emergency on cardene infusion and chest pain. Cardiology consulted.    Started on IV NTG overnight for CP    Currently CP free  EKG NSR LPFB NSSTT changes  Troponin 1.1 flat pattern Cr 1.5  BNP 3693  /129 in ER    Known to our service from hospital admissions - never followed up in clinic  Ischemic CM  [place] AICD    L MPI 10/29/19 (images  prev personally reviewed and interpreted)    Abnormal Luann myocardial perfusion study.    There is a large sized, severe intensity, fixed defect in the basal to apical inferior wall(s) in the typical distribution of the RCA territory.    Gated perfusion images showed an ejection fraction of 24 % post stress.    There is severe global hypokinesis at stress.    There is basal to apical inferior wall akinesis at stress.    LV cavity size is  and moderately enlarged at stress.     Echo: 10/28/19 (images prev personally reviewed and interpreted; c/w report 7/2019: similar LVEF, MR not noted)  · Moderately decreased left ventricular systolic function. The estimated ejection fraction is 30%  · Severe global hypokinetic wall motion. Cannot exclude septal WMA.  · Grade III (severe) left ventricular diastolic dysfunction consistent with restrictive physiology.  · Mild right ventricular enlargement.  · Mildly to moderately reduced right ventricular systolic function.  · Mild tricuspid regurgitation.  · The  estimated PA systolic pressure is 49 mm Hg  · Pulmonary hypertension present.     Cath: 7/26/19 (images prev personally reviewed and interpreted)  1.  Severe triple-vessel coronary artery disease  2.  Patent BARAJAS to LAD, patent SVG to diagonal 1 and patent SVG to circumflex.  No culprit stenosis noted in these grafts  3.  Mid to distal RCA .  Distal RCA fills with collaterals from the left.  Unchanged from prior.  Findings/Key Components:  LVEDP: 19 mmHg  Dominance: Right   LM:  Patent with no significant stenosis  LAD:  Diffusely disease proximal to mid.  Fills with LIMA to LAD and SVG to diagonal 1  LCx:  Native vessel diffusely disease.  Fills with SVG to OM2  RCA:  Proximal 70% stenosis.  Distal .  Fills with collaterals from the left.  Hemostasis:  RFA 5 [place] sheath.  Manual pressure held in the cath lab for hemostasis  Impression:  Severe triple-vessel coronary artery disease.  Patent LIMA to LAD, SVG to OM and SVG to diagonal.  No acute culprit lesion identified  Plan:  Aspirin 81 mg daily indefinitely  Plavix 75 mg daily for at least 1 year  Follow-up in Cardiology Clinic

## 2020-01-02 NOTE — PLAN OF CARE
Pt asleep intermittently, arouses easily. Pt denies discomforts. Pt aware of plan of care. Call light within reach and bed in low position. Pt free of injury. Tolerating meals well. Pt aware of fluid restrictions and rationale given.

## 2020-01-02 NOTE — PLAN OF CARE
Pt alert and oriented.  Cardene stopped and tridil drip started due to complaint of chest pain.  Chest pain resolved after initiation of tridil.  Tridil continues to infuse for HTN.  NSR per monitor with occasional PVCs.  Voiding per urinal.  No skin breakdown or injury observed, safety precautions maintained.

## 2020-01-02 NOTE — CARE UPDATE
[facility]  ICU Multidisciplinary Bedside Rounds     UPDATE     Date: 1/2/2020      Plan of care reviewed with the following, Nurse, Charge Nurse, Physician, Pulm CC, Resp. Therapist and Infection Prevention.       Needs/ Goals for the day: Wean nitro gtt, control chest pain       Level of Care: Critical Care

## 2020-01-02 NOTE — ASSESSMENT & PLAN NOTE
Elevated troponin 1.1 flat pattern. Suspect demand ischemia from HTN and not ACS. Recent echo with EF 30 and fixed inferior defect 10/29/19. [facility] from 7/2019 with patent grafts and RCA  - medical Rx. Will not repeat ischemic evaluation at this time. Ok for telemetry once weaned off IV NTG

## 2020-01-02 NOTE — H&P
[facility] Medicine  History & Physical    Patient Name: Abdoul Ochoa  MRN: [id number]  Admission Date: 1/1/2020  Attending Physician: Mamie Mccormick MD   Primary Care Provider: Primary Doctor No         Patient information was obtained from patient and ER records.     Subjective:     Principal Problem:Hypertensive emergency without congestive heart failure    Chief Complaint:   Chief Complaint   Patient presents with    Cough     EMS reports pt with hx of CHF c/o cough x 4 days with chest pain. also having some leg swelling. reports some recent changes to his medications.     Chest Pain        HPI: 54 year old male patient brought to the ED by EMS complaining of leg swelling for the past 3 days. The patient also reports cough, chest pain that is worse with cough, and emesis. He denies any fever, shortness of breath, or diarrhea. Per EMS, the patient has a past medical history of Congestive Heart Failure. EMS also reports that the patient has recently had some changes with his regular medications. The patient has significant history of my card up the, MI 2013, 2015, 2016, 2018 (total of 4 stents), triple CABG July 26, 2019.    Pt admitted with hypertensive emergency on cardene infusion and chest pain. Cardiology consulted.       Past Medical History:   Diagnosis Date    AICD (automatic cardioverter/defibrillator) present 08/05/2019    Alcoholic intoxication without complication     Anemia in stage 3 chronic kidney disease 1/18/2018    CKD stage 3 7/31/2019    Coronary artery disease     Coronary artery disease of native artery of native heart with stable angina pectoris 1/18/2018    Essential hypertension 1/17/2018    History of CVA (cerebrovascular accident) 1/18/2018    Ischemic cardiomyopathy 7/27/2019    MI (myocardial infarction)     2013, 2015, 2016, 2018 (total of 4 stents), triple CABG July 26, 2019    Mixed hyperlipidemia 1/17/2018    Nonsustained ventricular  tachycardia 7/31/2019    Pneumonia of both lungs due to infectious organism        Past Surgical History:   Procedure Laterality Date    BYPASS GRAFT      CARDIAC DEFIBRILLATOR PLACEMENT Left 08/05/2019    CARDIAC SURGERY  07/26/2019    three vessel CABG    CORONARY ANGIOGRAPHY INCLUDING BYPASS GRAFTS WITH CATHETERIZATION OF LEFT HEART N/A 7/26/2019    Procedure: ANGIOGRAM, CORONARY, INCLUDING BYPASS GRAFT, WITH LEFT HEART CATHETERIZATION;  Surgeon: Chadwick Ramirez MD;  Location: [facility] CATH LAB;  Service: Cardiology;  Laterality: N/A;  rt groin, 5 fr, visipaque    CORONARY ANGIOPLASTY WITH STENT PLACEMENT  01/18/2018    4 stents    TRACHEOSTOMY CLOSURE  2014       Review of patient's allergies indicates:  No Known Allergies    No current facility-administered medications on file prior to encounter.      Current Outpatient Medications on File Prior to Encounter   Medication Sig    amiodarone (PACERONE) 400 MG tablet Take 1 tablet (400 mg total) by mouth once daily.    aspirin 81 MG Chew Take 81 mg by mouth once daily.    furosemide (LASIX) 40 MG tablet Take 1 tablet (40 mg total) by mouth 2 (two) times daily.    atorvastatin (LIPITOR) 80 MG tablet Take 1 tablet (80 mg total) by mouth once daily.    carvedilol (COREG) 25 MG tablet Take 1 tablet (25 mg total) by mouth 2 (two) times daily.    clopidogrel (PLAVIX) 75 mg tablet Take 1 tablet (75 mg total) by mouth once daily.    isosorbide mononitrate (IMDUR) 120 MG 24 hr tablet Take 1 tablet (120 mg total) by mouth once daily.    lisinopril (PRINIVIL,ZESTRIL) 40 MG tablet Take 1 tablet (40 mg total) by mouth once daily.    nitroGLYCERIN (NITROSTAT) 0.4 MG SL tablet Place 1 tablet (0.4 mg total) under the tongue every 5 (five) minutes as needed for Chest pain.    spironolactone (ALDACTONE) 25 MG tablet Take 1 tablet (25 mg total) by mouth once daily.     Family History     Problem Relation (Age of Onset)    Heart disease Mother, Father    Hypertension  "Mother, Father        Tobacco Use    Smoking status: Former Smoker     Types: Cigarettes     Last attempt to quit: 2019     Years since quittin.5    Smokeless tobacco: Never Used   Substance and Sexual Activity    Alcohol use: Yes     Frequency: Never     Comment: beer on the weekends "barely"    Drug use: Yes     Types: Marijuana     Comment: "every now and then"    Sexual activity: Yes     Partners: Female     Review of Systems   Constitutional: Negative.    HENT: Negative.    Eyes: Negative.    Respiratory: Positive for cough and shortness of breath.    Cardiovascular: Positive for chest pain.   Gastrointestinal: Negative.    Endocrine: Negative.    Genitourinary: Negative.    Neurological: Positive for weakness.   Psychiatric/Behavioral: Negative.      Objective:     Vital Signs (Most Recent):  Temp: 98.4 °F (36.9 °C) (20 1600)  Pulse: 87 (20)  Resp: 20 (20)  BP: 128/60 (20)  SpO2: 98 % (20) Vital Signs (24h Range):  Temp:  [97.5 °F (36.4 °C)-98.4 °F (36.9 °C)] 98.4 °F (36.9 °C)  Pulse:  [68-88] 87  Resp:  [12-28] 20  SpO2:  [94 %-100 %] 98 %  BP: (120-197)/() 128/60     Weight: 77.1 kg (170 lb)  Body mass index is 27.44 kg/m².    Physical Exam   Constitutional: He is oriented to person, place, and time. He appears well-developed and well-nourished.   HENT:   Head: Normocephalic and atraumatic.   Mouth/Throat: Oropharynx is clear and moist.   Eyes: Pupils are equal, round, and reactive to light. EOM are normal.   Neck: Normal range of motion. Neck supple.   Cardiovascular: Normal rate, regular rhythm and intact distal pulses.   Murmur heard.  Pulmonary/Chest: Effort normal and breath sounds normal. No respiratory distress. He has no wheezes.   Abdominal: Soft. Bowel sounds are normal. He exhibits no distension.   Musculoskeletal: Normal range of motion. He exhibits no edema.   Neurological: He is alert and oriented to person, place, and time. "   Skin: Skin is warm and dry. Capillary refill takes 2 to 3 seconds.   Psychiatric: He has a normal mood and affect.         CRANIAL NERVES     CN III, IV, VI   Pupils are equal, round, and reactive to light.  Extraocular motions are normal.        Significant Labs:   A1C:   Recent Labs   Lab 10/29/19  0420   HGBA1C 6.1*     Bilirubin:   Recent Labs   Lab 01/01/20  0925   BILITOT 4.0*     CBC:   Recent Labs   Lab 01/01/20  0925   WBC 5.91   HGB 11.2*   HCT 36.9*        CMP:   Recent Labs   Lab 01/01/20  0925      K 3.5      CO2 21*   GLU 83   BUN 22*   CREATININE 1.6*   CALCIUM 10.3   PROT 7.4   ALBUMIN 3.9   BILITOT 4.0*   ALKPHOS 146*   AST 35   ALT 31   ANIONGAP 18*   EGFRNONAA 47*     Cardiac Markers:   Recent Labs   Lab 01/01/20  0925   BNP 3,693*     Coagulation:   Recent Labs   Lab 01/01/20  0925   INR 1.3*     Lipase:   Recent Labs   Lab 01/01/20  0925   LIPASE 15     Lipid Panel: No results for input(s): CHOL, HDL, LDLCALC, TRIG, CHOLHDL in the last 48 hours.  Magnesium: No results for input(s): MG in the last 48 hours.  POCT Glucose: No results for input(s): POCTGLUCOSE in the last 48 hours.  Troponin:   Recent Labs   Lab 01/01/20  0925 01/01/20  1720   TROPONINI 1.100* 1.107*     TSH: No results for input(s): TSH in the last 4320 hours.  Urine Studies: No results for input(s): COLORU, APPEARANCEUA, PHUR, SPECGRAV, PROTEINUA, GLUCUA, KETONESU, BILIRUBINUA, OCCULTUA, NITRITE, UROBILINOGEN, LEUKOCYTESUR, RBCUA, WBCUA, BACTERIA, SQUAMEPITHEL, HYALINECASTS in the last 48 hours.    Invalid input(s): ONESIMO    Significant Imaging: I have reviewed and interpreted all pertinent imaging results/findings within the past 24 hours.    Assessment/Plan:     * Hypertensive emergency without congestive heart failure    cardene infusion  Resume home meds    Chronic combined systolic and diastolic congestive heart failure  No evidence of decompensation  Cont home meds     ICD (implantable  cardioverter-defibrillator), single, in situ  No acute issues      Acute chest pain  Appears similar to multiple other visits  Reports taken off a lot of cardiac meds when he went to [facility]  Troponin trend  BP control  Asa, plavix and statin   Cardiology consulted       Anemia in stage 3 chronic kidney disease    Stable     Coronary artery disease involving native coronary artery of native heart    See above      Tobacco abuse  Counseled on smoking cessation >5 min  Nicotine patch       Essential hypertension  cardene infusion  Resume home meds         VTE Risk Mitigation (From admission, onward)         Ordered     enoxaparin injection 30 mg  Daily      01/01/20 1845     IP VTE HIGH RISK PATIENT  Once      01/01/20 1706     Place CHAPITO hose  Until discontinued      01/01/20 1706              Critical care time spent on the evaluation and treatment of severe organ dysfunction, review of pertinent labs and imaging studies, discussions with consulting providers and discussions with patient/family: 40 minutes.     Mamie Mccormick MD  Department of Hospital Medicine   [facility]

## 2020-01-02 NOTE — CONSULTS
[facility]  Cardiology  Consult Note    Patient Name: Abdoul Ochoa  MRN: [id number]  Admission Date: 1/1/2020  Hospital Length of Stay: 1 days  Code Status: Full Code   Attending Provider: Mamie Mccormick MD   Consulting Provider: Garland Mccarty MD  Primary Care Physician: Primary Doctor No  Principal Problem:Hypertensive emergency without congestive heart failure    Patient information was obtained from patient and ER records.     Consults  Subjective:     Chief Complaint:  CP, CHF     HPI: 54 year old male patient brought to the ED by EMS complaining of leg swelling for the past 3 days. The patient also reports cough, chest pain that is worse with cough, and emesis. He denies any fever, shortness of breath, or diarrhea. Per EMS, the patient has a past medical history of Congestive Heart Failure. EMS also reports that the patient has recently had some changes with his regular medications. The patient has significant history of my card up the, MI 2013, 2015, 2016, 2018 (total of 4 stents), triple CABG July 26, 2019.     Pt admitted with hypertensive emergency on cardene infusion and chest pain. Cardiology consulted.    Started on IV NTG overnight for CP    Currently CP free  EKG NSR LPFB NSSTT changes  Troponin 1.1 flat pattern Cr 1.5  BNP 3693  /129 in ER    Known to our service from hospital admissions - never followed up in clinic  Ischemic   Biotronik AICD    L MPI 10/29/19 (images  prev personally reviewed and interpreted)    Abnormal Luann myocardial perfusion study.    There is a large sized, severe intensity, fixed defect in the basal to apical inferior wall(s) in the typical distribution of the RCA territory.    Gated perfusion images showed an ejection fraction of 24 % post stress.    There is severe global hypokinesis at stress.    There is basal to apical inferior wall akinesis at stress.    LV cavity size is  and moderately enlarged at stress.     Echo: 10/28/19 (images  prev personally reviewed and interpreted; c/w report 7/2019: similar LVEF, MR not noted)  · Moderately decreased left ventricular systolic function. The estimated ejection fraction is 30%  · Severe global hypokinetic wall motion. Cannot exclude septal WMA.  · Grade III (severe) left ventricular diastolic dysfunction consistent with restrictive physiology.  · Mild right ventricular enlargement.  · Mildly to moderately reduced right ventricular systolic function.  · Mild tricuspid regurgitation.  · The estimated PA systolic pressure is 49 mm Hg  · Pulmonary hypertension present.     Cath: 7/26/19 (images prev personally reviewed and interpreted)  1.  Severe triple-vessel coronary artery disease  2.  Patent BARAJAS to LAD, patent SVG to diagonal 1 and patent SVG to circumflex.  No culprit stenosis noted in these grafts  3.  Mid to distal RCA .  Distal RCA fills with collaterals from the left.  Unchanged from prior.  Findings/Key Components:  LVEDP: 19 mmHg  Dominance: Right   LM:  Patent with no significant stenosis  LAD:  Diffusely disease proximal to mid.  Fills with LIMA to LAD and SVG to diagonal 1  LCx:  Native vessel diffusely disease.  Fills with SVG to OM2  RCA:  Proximal 70% stenosis.  Distal .  Fills with collaterals from the left.  Hemostasis:  RFA 5 [place] sheath.  Manual pressure held in the cath lab for hemostasis  Impression:  Severe triple-vessel coronary artery disease.  Patent LIMA to LAD, SVG to OM and SVG to diagonal.  No acute culprit lesion identified  Plan:  Aspirin 81 mg daily indefinitely  Plavix 75 mg daily for at least 1 year  Follow-up in Cardiology Clinic    Past Medical History:   Diagnosis Date    AICD (automatic cardioverter/defibrillator) present 08/05/2019    Alcoholic intoxication without complication     Anemia in stage 3 chronic kidney disease 1/18/2018    CKD stage 3 7/31/2019    Coronary artery disease     Coronary artery disease of native artery of native heart with  stable angina pectoris 1/18/2018    Essential hypertension 1/17/2018    History of CVA (cerebrovascular accident) 1/18/2018    Ischemic cardiomyopathy 7/27/2019    MI (myocardial infarction)     2013, 2015, 2016, 2018 (total of 4 stents), triple CABG July 26, 2019    Mixed hyperlipidemia 1/17/2018    Nonsustained ventricular tachycardia 7/31/2019    Pneumonia of both lungs due to infectious organism        Past Surgical History:   Procedure Laterality Date    BYPASS GRAFT      CARDIAC DEFIBRILLATOR PLACEMENT Left 08/05/2019    CARDIAC SURGERY  07/26/2019    three vessel CABG    CORONARY ANGIOGRAPHY INCLUDING BYPASS GRAFTS WITH CATHETERIZATION OF LEFT HEART N/A 7/26/2019    Procedure: ANGIOGRAM, CORONARY, INCLUDING BYPASS GRAFT, WITH LEFT HEART CATHETERIZATION;  Surgeon: Chadwick Ramirez MD;  Location: [facility] CATH LAB;  Service: Cardiology;  Laterality: N/A;  rt groin, 5 fr, visipaque    CORONARY ANGIOPLASTY WITH STENT PLACEMENT  01/18/2018    4 stents    TRACHEOSTOMY CLOSURE  2014       Review of patient's allergies indicates:  No Known Allergies    No current facility-administered medications on file prior to encounter.      Current Outpatient Medications on File Prior to Encounter   Medication Sig    amiodarone (PACERONE) 400 MG tablet Take 1 tablet (400 mg total) by mouth once daily.    aspirin 81 MG Chew Take 81 mg by mouth once daily.    furosemide (LASIX) 40 MG tablet Take 1 tablet (40 mg total) by mouth 2 (two) times daily.    atorvastatin (LIPITOR) 80 MG tablet Take 1 tablet (80 mg total) by mouth once daily.    carvedilol (COREG) 25 MG tablet Take 1 tablet (25 mg total) by mouth 2 (two) times daily.    clopidogrel (PLAVIX) 75 mg tablet Take 1 tablet (75 mg total) by mouth once daily.    isosorbide mononitrate (IMDUR) 120 MG 24 hr tablet Take 1 tablet (120 mg total) by mouth once daily.    lisinopril (PRINIVIL,ZESTRIL) 40 MG tablet Take 1 tablet (40 mg total) by mouth once daily.     "nitroGLYCERIN (NITROSTAT) 0.4 MG SL tablet Place 1 tablet (0.4 mg total) under the tongue every 5 (five) minutes as needed for Chest pain.    spironolactone (ALDACTONE) 25 MG tablet Take 1 tablet (25 mg total) by mouth once daily.     Family History     Problem Relation (Age of Onset)    Heart disease Mother, Father    Hypertension Mother, Father        Tobacco Use    Smoking status: Former Smoker     Types: Cigarettes     Last attempt to quit: 2019     Years since quittin.5    Smokeless tobacco: Never Used   Substance and Sexual Activity    Alcohol use: Yes     Frequency: Never     Comment: beer on the weekends "barely"    Drug use: Yes     Types: Marijuana     Comment: "every now and then"    Sexual activity: Yes     Partners: Female     Review of Systems   Constitution: Negative for decreased appetite.   HENT: Negative for ear discharge.    Eyes: Negative for blurred vision.   Endocrine: Negative for polyphagia.   Skin: Negative for nail changes.   Genitourinary: Negative for bladder incontinence.   Neurological: Negative for aphonia.   Psychiatric/Behavioral: Negative for hallucinations.   Allergic/Immunologic: Negative for hives.     Objective:     Vital Signs (Most Recent):  Temp: 97.7 °F (36.5 °C) (20 0715)  Pulse: 77 (20)  Resp: 20 (20)  BP: 136/77 (20)  SpO2: 97 % (20) Vital Signs (24h Range):  Temp:  [97.5 °F (36.4 °C)-98.4 °F (36.9 °C)] 97.7 °F (36.5 °C)  Pulse:  [63-87] 77  Resp:  [12-33] 20  SpO2:  [94 %-100 %] 97 %  BP: (118-197)/() 136/77     Weight: 77.1 kg (170 lb)  Body mass index is 27.44 kg/m².    SpO2: 97 %  O2 Device (Oxygen Therapy): nasal cannula      Intake/Output Summary (Last 24 hours) at 2020 0929  Last data filed at 2020 0800  Gross per 24 hour   Intake 556.13 ml   Output 2950 ml   Net -2393.87 ml       Lines/Drains/Airways     Peripheral Intravenous Line                 Peripheral IV - Single Lumen 20 " 0925 20 G Right Hand 1 day         Peripheral IV - Single Lumen 01/01/20 20 G Left Forearm 1 day                Physical Exam   Constitutional: He is oriented to person, place, and time. He appears well-developed and well-nourished.   HENT:   Head: Normocephalic and atraumatic.   Eyes: Pupils are equal, round, and reactive to light. Conjunctivae are normal.   Neck: Normal range of motion. Neck supple.   Cardiovascular: Normal rate, normal heart sounds and intact distal pulses.   Pulmonary/Chest: Effort normal and breath sounds normal.   Abdominal: Soft. Bowel sounds are normal.   Musculoskeletal: Normal range of motion.   Neurological: He is alert and oriented to person, place, and time.   Skin: Skin is warm and dry.       Significant Labs: All pertinent lab results from the last 24 hours have been reviewed.    Significant Imaging: Echocardiogram:   2D echo with color flow doppler:   Results for orders placed or performed during the hospital encounter of 01/17/18   2D echo with color flow doppler   Result Value Ref Range    QEF 20 (A) 55 - 65    Mitral Valve Regurgitation MODERATE TO SEVERE (A)     Est. PA Systolic Pressure 45.45 (A)     Pericardial Effusion NONE     Tricuspid Valve Regurgitation MILD TO MODERATE     Narrative    Date of Procedure: 01/17/2018        TEST DESCRIPTION   Technical Quality: This is a technically adequate study.     Aorta: The aortic root is normal in size, measuring 2.8 cm at sinotubular junction and 3.1 cm at Sinuses of Valsalva. The proximal ascending aorta is normal in size, measuring 2.6 cm across.     Left Atrium: The left atrial volume index is mildly enlarged, measuring 37.41 cc/m2.     Left Ventricle: The left ventricle is normal in size, with an end-diastolic diameter of 5.4 cm, and an end-systolic diameter of 4.8 cm. LV wall thickness is normal, with the septum measuring 1.2 cm and the posterior wall measuring 1.0 cm across. Relative   wall thickness was normal at 0.37, and  the LV mass index was increased at 150.9 g/m2 consistent with eccentric left ventricular hypertrophy. There are no regional wall motion abnormalities. Left ventricular systolic function appears severely depressed.   Visually estimated ejection fraction is 20-25%. The LV Doppler derived stroke volume equals 29.0 ccs.         Right Atrium: The right atrium is normal in size, measuring 4.7 cm in length and 4.5 cm in width in the apical view.     Right Ventricle: The right ventricle is normal in size measuring 4.0 cm at the base in the apical right ventricle-focused view. Global right ventricular systolic function appears severely depressed. Tricuspid annular plane systolic excursion (TAPSE) is   .8 cm. Tissue Doppler-derived tricuspid annular peak systolic velocity (S prime) is 6.1 cm/s. The estimated PA systolic pressure is 45 mmHg.     Aortic Valve:  The aortic valve is tri-leaflet in structure.     Mitral Valve:  There is moderate to severe mitral regurgitation.     Tricuspid Valve:  There is mild to moderate tricuspid regurgitation.     Pericardium: There is no evidence of pericardial effusion.      IVC: IVC is enlarged but collapses > 50% with a sniff, suggesting intermediate right atrial pressure of 8 mmHg.     Intracavitary: There is no evidence of intracavity mass, thrombi, or vegetation.         CONCLUSIONS     1 - Severely depressed left ventricular systolic function (EF 20-25%).     2 - Eccentric hypertrophy.     3 - Severely depressed right ventricular systolic function .     4 - Pulmonary hypertension. The estimated PA systolic pressure is 45 mmHg.     5 - Moderate to severe mitral regurgitation.     6 - Intermediate central venous pressure.             This document has been electronically    SIGNED BY: Charlie Laureano MD On: 01/17/2018 11:56     Assessment and Plan:     Chronic combined systolic and diastolic congestive heart failure  Mild volume overload on CXR - diuresis and afterload reduction as  tolerated    ICD (implantable cardioverter-defibrillator), single, in situ  Biotronik - stable    Coronary artery disease involving native coronary artery of native heart  Elevated troponin 1.1 flat pattern. Suspect demand ischemia from HTN and not ACS. Recent echo with EF 30 and fixed inferior defect 10/29/19. LHC from 7/2019 with patent grafts and RCA  - medical Rx. Will not repeat ischemic evaluation at this time. Ok for telemetry once weaned off IV NTG    Tobacco abuse  counseled    Mixed hyperlipidemia  On statin    Essential hypertension  Poorly controlled on admission - likely from medical noncompliance        VTE Risk Mitigation (From admission, onward)         Ordered     enoxaparin injection 30 mg  Daily      01/01/20 1845     IP VTE HIGH RISK PATIENT  Once      01/01/20 1706     Place CHAPITO hose  Until discontinued      01/01/20 1706              40 minutes spent with patient in ICU    Thank you for your consult. I will follow-up with patient. Please contact us if you have any additional questions.    Garland Mccarty MD  Cardiology   [facility]

## 2020-01-02 NOTE — HPI
54 year old male patient brought to the ED by EMS complaining of leg swelling for the past 3 days. The patient also reports cough, chest pain that is worse with cough, and emesis. He denies any fever, shortness of breath, or diarrhea. Per EMS, the patient has a past medical history of Congestive Heart Failure. EMS also reports that the patient has recently had some changes with his regular medications. The patient has significant history of my card up the, MI 2013, 2015, 2016, 2018 (total of 4 stents), triple CABG July 26, 2019.    Pt admitted with hypertensive emergency on cardene infusion and chest pain. Cardiology consulted.

## 2020-01-02 NOTE — PLAN OF CARE
Pt with orders to transfer to [facility]. Tridal gtt weaned off notified Dr. Mccormick. BP stable. PT on 3 L NC. Voiding per urinal. No falls, injuries, or skin breakdown. Pt updated on plan of care.

## 2020-01-02 NOTE — PROGRESS NOTES
1945 - Pt complaining of sharp, nonradiating, midsternal chest pain, 8/10 severity.  Denies associated symptoms including N/V, SOB.  States pain worse with coughing.  EKG obtained.  Pt repositioned and reassured.  No active orders for pain medication.  eICU notified.     2045 - MD Mccarty notified of chest pain, reviewed history with MD.  New orders received.     2130 - Pt reports improvement in chest pain following initiation of tridil drip.  Will titrate accordingly.

## 2020-01-03 NOTE — NURSING
0036- Report received from Ling MARTINEZ, ICU nurse.   0130- Pt arrived to floor, awake and alert. 2l O2 nasal cannula in use. NAD noted. Cardiac monitor in use, alarm set. Pt oriented to room. Bed locked in lowest position, alarm set. Call light within reach. Will continue to monitor.

## 2020-01-03 NOTE — PLAN OF CARE
Patient is ready for discharge. Pain went from 8 to  3/10 prs. No falls or injuries noted. No acute distress present. Patient states feels much better. O2 infusing at 2l/min via nasal cannula

## 2020-01-03 NOTE — PROGRESS NOTES
[facility] WRITTEN HEALTHCARE AND DISCHARGE INFORMATION.  Follow-up Information     PCP In 1 week.           Garland Mccarty MD On 3/2/2020.    Specialty:  Cardiology  Why:  out patient services: at 11:00AM follo up from the hospital.  Contact information:  [address]  [address]  [place] [address]  [phone]             [facility] [place]. Schedule an appointment as soon as possible for a visit on 1/8/2020.    Why:  [address]. PATIENT TO ARRIVE8:50am.With Dr. Gold Turcios. Please bring picutre ID and proof of insurance  Contact information:  [address]  [place] [address]  [phone]                   Help at Home           [phone]  After discharge for assistance [facility] On Call Nurse Care Line 24/7  Assistance   Things You are responsible For To Manage Your Care At Home:  1.    Getting your prescriptions filled   2.    Taking your medications as directed, DO NOT MISS ANY DOSES!  3.    Going to your follow-up doctor appointment. This is important because it  allow the doctor to monitor your progress and determine if  any changes need to made to your treatment plan.   Thank you for choosing [facility] for your care.  Please answer any calls you may receive from [facility] we want to continue to support you as you manage your healthcare needs. [facility] is happy to have the opportunity to serve you.    Sincerely,  Your [facility] Team,  Amie Cazares RN, BSN, STN CCM  1/3/2020  [phone]

## 2020-01-03 NOTE — NURSING
Discharge instructions given with teach back method used. No acute distress noted. Follow up appointment, EMS. When to seek additional medical help. No acute distress noted

## 2020-01-03 NOTE — PLAN OF CARE
11:37AM TN informed telemetry nurse Leesa that pt is cleared for [facility].     01/03/20 1140   Final Note   Assessment Type Final Discharge Note   Anticipated Discharge Disposition Home   What phone number can be called within the next 1-3 days to see how you are doing after discharge?   (see chart)   Hospital Follow Up  Appt(s) scheduled? Yes   Discharge plans and expectations educations in teach back method with documentation complete? Yes   Right Care Referral Info   Referral Type no care   .Amie Cazares, RN, BSN, STN [facility]  1/3/2020

## 2020-01-03 NOTE — PLAN OF CARE
01/03/20 1133   Post-Acute Status   Post-Acute Authorization Other   Other Status No Post-Acute Service Needs   Discharge Delays None known at this time   .Amie Cazares, RN, BSN, STN [facility]  1/3/2020

## 2020-01-04 NOTE — DISCHARGE SUMMARY
[facility] Hospital Medicine  Discharge Summary      Patient Name: Abdoul Ochoa  MRN: [id number]  Admission Date: 1/1/2020  Hospital Length of Stay: 2 days  Discharge Date and Time: 1/3/2020 12:22 PM  Attending Physician:  Marline Ott MD  Discharging Provider: Marline Ott MD  Primary Care Provider: Gold Turcios MD      HPI:   54 year old male patient brought to the ED by EMS complaining of leg swelling for the past 3 days. The patient also reports cough, chest pain that is worse with cough, and emesis. He denies any fever, shortness of breath, or diarrhea. Per EMS, the patient has a past medical history of Congestive Heart Failure. EMS also reports that the patient has recently had some changes with his regular medications. The patient has significant history of my card up the, MI 2013, 2015, 2016, 2018 (total of 4 stents), triple CABG July 26, 2019.    Pt admitted with hypertensive emergency on cardene infusion and chest pain. Cardiology consulted.       * No surgery found *      Hospital Course:   Mr. Ochoa was admitted with chest pain and hypertensive urgency/emergency to the ICU. Treatment was initiated with cardene gtt which was then changed to tridil infusion. Pt chest pain improved. BP improved. He was resumed on his home blood pressure medications and tridil gtt was weaned off. Cardiology consulted. Elevated troponins were trended flat and not consistent with ACS, and was secondary to HTN urgency. No further ischemic workup indicated as per Cardiology. Hospitalization all secondary to non-compliance with medications. He was stepped down to floor on 1/2/20. Monitored overnight on telemetry and no ectopy noted. Blood pressures were well controlled on PO regimen with SBP in the 130s. He was educated on need for compliance with all of his medications and new prescriptions for all of his medications were sent to his pharmacy. Follow up arranged.     Consults:   Consults (From admission,  onward)        Status Ordering Provider     Inpatient consult to Cardiology  Once     Provider:  Garland Mccarty MD    Completed LORENA NASSAR        Service: Hospital Medicine    Final Active Diagnoses:    Diagnosis Date Noted POA    PRINCIPAL PROBLEM:  Hypertensive emergency without congestive heart failure [I16.1] 01/01/2020 Yes    Coronary artery disease involving native coronary artery of native heart [I25.10] 01/18/2018 Yes    Essential hypertension [I10] 01/17/2018 Yes     Chronic    Mixed hyperlipidemia [E78.2] 01/17/2018 Yes     Chronic    Chronic combined systolic and diastolic congestive heart failure [I50.42] 01/01/2020 Yes    ICD (implantable cardioverter-defibrillator), single, in situ [Z95.810] 10/28/2019 Yes    Anemia in stage 3 chronic kidney disease [N18.3, D63.1] 01/18/2018 Yes    Tobacco abuse [Z72.0] 01/17/2018 Yes     Chronic      Problems Resolved During this Admission:    Diagnosis Date Noted Date Resolved POA    Acute chest pain [R07.9] 07/30/2019 01/02/2020 Yes       Discharged Condition: good    Disposition: Home or Self Care    Follow Up:  Follow-up Information     PCP In 1 week.           Garland Mccarty MD On 3/2/2020.    Specialty:  Cardiology  Why:  out patient services: at 11:00AM follo up from the hospital.  Contact information:  [address]  [address]  [facility] [address]  [phone]             [facility]. Schedule an appointment as soon as possible for a visit on 1/8/2020.    [facility]  [facility] TO ARRIVE8:50am.With Dr. Gold Turcios. Please bring picutre ID and proof of insurance  Contact information:  [address] [place] [address]  [phone]                 Patient Instructions:      Diet Cardiac     Activity as tolerated       Significant Diagnostic Studies:     Pending Diagnostic Studies:     Procedure Component Value Units Date/Time    EKG 12-lead [[id number]]     Order Status:  Sent Lab Status:  No result           Medications:  Reconciled Home Medications:      Medication List      CONTINUE taking these medications    amiodarone 400 MG tablet  Commonly known as:  PACERONE  Take 1 tablet (400 mg total) by mouth once daily.     aspirin 81 MG Chew  Take 81 mg by mouth once daily.     atorvastatin 80 MG tablet  Commonly known as:  LIPITOR  Take 1 tablet (80 mg total) by mouth once daily.     carvedilol 25 MG tablet  Commonly known as:  COREG  Take 1 tablet (25 mg total) by mouth 2 (two) times daily.     clopidogrel 75 mg tablet  Commonly known as:  PLAVIX  Take 1 tablet (75 mg total) by mouth once daily.     furosemide 40 MG tablet  Commonly known as:  LASIX  Take 1 tablet (40 mg total) by mouth 2 (two) times daily.     isosorbide mononitrate 120 MG 24 hr tablet  Commonly known as:  IMDUR  Take 1 tablet (120 mg total) by mouth once daily.     lisinopril 40 MG tablet  Commonly known as:  PRINIVIL,ZESTRIL  Take 1 tablet (40 mg total) by mouth once daily.     nitroGLYCERIN 0.4 MG SL tablet  Commonly known as:  NITROSTAT  Place 1 tablet (0.4 mg total) under the tongue every 5 (five) minutes as needed for Chest pain.     spironolactone 25 MG tablet  Commonly known as:  ALDACTONE  Take 1 tablet (25 mg total) by mouth once daily.            Indwelling Lines/Drains at time of discharge:   Lines/Drains/Airways     None                 Time spent on the discharge of patient: 35 minutes  Patient was seen and examined on the date of discharge and determined to be suitable for discharge.         Marline Ott MD  Department of Hospital Medicine  [facility]

## 2020-01-13 NOTE — ASSESSMENT & PLAN NOTE
-chronic, stable  -continue secondary prevention with ASA and statin    DOS: 1-15  Patient was instructed to take the following medications on the day of surgery: none.

## 2020-02-28 PROBLEM — I25.700 CORONARY ARTERY DISEASE INVOLVING CORONARY BYPASS GRAFT OF NATIVE HEART WITH UNSTABLE ANGINA PECTORIS: Status: ACTIVE | Noted: 2019-01-01

## 2020-02-28 PROBLEM — I20.0 UNSTABLE ANGINA: Status: ACTIVE | Noted: 2019-01-01

## 2020-02-28 PROBLEM — I50.43 ACUTE ON CHRONIC COMBINED SYSTOLIC AND DIASTOLIC CONGESTIVE HEART FAILURE: Status: ACTIVE | Noted: 2020-01-01

## 2020-02-28 NOTE — SUBJECTIVE & OBJECTIVE
Past Medical History:   Diagnosis Date    AICD (automatic cardioverter/defibrillator) present 08/05/2019    Alcoholic intoxication without complication     Anemia in stage 3 chronic kidney disease 1/18/2018    CHF (congestive heart failure)     CKD stage 3 7/31/2019    Coronary artery disease     Coronary artery disease of native artery of native heart with stable angina pectoris 1/18/2018    Essential hypertension 1/17/2018    History of CVA (cerebrovascular accident) 1/18/2018    Ischemic cardiomyopathy 7/27/2019    MI (myocardial infarction)     2013, 2015, 2016, 2018 (total of 4 stents), triple CABG July 26, 2019    Mixed hyperlipidemia 1/17/2018    Nonsustained ventricular tachycardia 7/31/2019    Pneumonia of both lungs due to infectious organism        Past Surgical History:   Procedure Laterality Date    BYPASS GRAFT      CARDIAC DEFIBRILLATOR PLACEMENT Left 08/05/2019    CARDIAC SURGERY  07/26/2019    three vessel CABG    CORONARY ANGIOGRAPHY INCLUDING BYPASS GRAFTS WITH CATHETERIZATION OF LEFT HEART N/A 7/26/2019    Procedure: ANGIOGRAM, CORONARY, INCLUDING BYPASS GRAFT, WITH LEFT HEART CATHETERIZATION;  Surgeon: Chadwick Ramirez MD;  Location: [facility] CATH LAB;  Service: Cardiology;  Laterality: N/A;  rt groin, 5 fr, visipaque    CORONARY ANGIOPLASTY WITH STENT PLACEMENT  01/18/2018    4 stents    TRACHEOSTOMY CLOSURE  2014       Review of patient's allergies indicates:  No Known Allergies    No current facility-administered medications on file prior to encounter.      Current Outpatient Medications on File Prior to Encounter   Medication Sig    amiodarone (PACERONE) 200 MG Tab Take 1 tablet (200 mg total) by mouth once daily.    aspirin 81 MG Chew Take 81 mg by mouth once daily.    atorvastatin (LIPITOR) 80 MG tablet Take 1 tablet (80 mg total) by mouth once daily.    carvedilol (COREG) 12.5 MG tablet Take 1 tablet (12.5 mg total) by mouth 2 (two) times daily.    furosemide (LASIX)  "40 MG tablet Take 1 tablet (40 mg total) by mouth 2 (two) times daily.    isosorbide mononitrate (IMDUR) 120 MG 24 hr tablet Take 2 tablets (240 mg total) by mouth once daily.    lisinopril (PRINIVIL,ZESTRIL) 40 MG tablet Take 1 tablet (40 mg total) by mouth once daily.    nitroGLYCERIN (NITROSTAT) 0.4 MG SL tablet Place 1 tablet (0.4 mg total) under the tongue every 5 (five) minutes as needed for Chest pain.    spironolactone (ALDACTONE) 25 MG tablet Take 1 tablet (25 mg total) by mouth once daily.    clopidogrel (PLAVIX) 75 mg tablet Take 1 tablet (75 mg total) by mouth once daily.     Family History     Problem Relation (Age of Onset)    Heart disease Mother, Father    Hypertension Mother, Father        Tobacco Use    Smoking status: Former Smoker     Types: Cigarettes     Last attempt to quit: 2019     Years since quittin.7    Smokeless tobacco: Never Used   Substance and Sexual Activity    Alcohol use: Yes     Frequency: Never     Comment: beer on the weekends "barely"    Drug use: Yes     Types: Marijuana     Comment: "every now and then"    Sexual activity: Yes     Partners: Female     Review of Systems   Constitutional: Negative for appetite change, chills, diaphoresis and fever.   HENT: Negative for congestion, hearing loss, sore throat, tinnitus and trouble swallowing.    Eyes: Negative for photophobia, discharge, itching and visual disturbance.   Respiratory: Positive for chest tightness and shortness of breath. Negative for apnea, cough, wheezing and stridor.    Cardiovascular: Positive for chest pain and leg swelling. Negative for palpitations.   Gastrointestinal: Negative for abdominal distention, abdominal pain, blood in stool, constipation, diarrhea and nausea.   Endocrine: Negative for polydipsia, polyphagia and polyuria.   Genitourinary: Negative for difficulty urinating, dysuria, flank pain and frequency.   Musculoskeletal: Negative for arthralgias, joint swelling and neck " stiffness.   Skin: Negative for color change, rash and wound.   Neurological: Negative for dizziness, tremors, seizures, light-headedness, numbness and headaches.   Hematological: Negative for adenopathy.   Psychiatric/Behavioral: Negative for hallucinations and self-injury.     Objective:     Vital Signs (Most Recent):  Temp: 97.7 °F (36.5 °C) (02/28/20 1110)  Pulse: (!) 55 (02/28/20 1110)  Resp: 17 (02/28/20 1110)  BP: 120/80 (02/28/20 1110)  SpO2: 98 % (02/28/20 1110) Vital Signs (24h Range):  Temp:  [97.5 °F (36.4 °C)-97.9 °F (36.6 °C)] 97.7 °F (36.5 °C)  Pulse:  [55-74] 55  Resp:  [17-20] 17  SpO2:  [98 %-100 %] 98 %  BP: (120-190)/() 120/80     Weight: 77.5 kg (170 lb 13.7 oz)  Body mass index is 27.58 kg/m².    Physical Exam   Constitutional: He is oriented to person, place, and time. He appears well-developed and well-nourished. He is cooperative.   HENT:   Head: Normocephalic and atraumatic.   Eyes: Pupils are equal, round, and reactive to light. Conjunctivae, EOM and lids are normal.   Neck: Full passive range of motion without pain. Neck supple. No JVD present. No edema present. No thyroid mass present.   Cardiovascular: S1 normal, S2 normal and intact distal pulses.   No murmur heard.  Abdominal: Soft. Bowel sounds are normal. He exhibits no distension and no abdominal bruit. There is no splenomegaly or hepatomegaly. There is no tenderness. There is no CVA tenderness.   Lymphadenopathy:     He has no cervical adenopathy.     He has no axillary adenopathy.   Neurological: He is alert and oriented to person, place, and time. He has normal reflexes. He displays no tremor. He displays no seizure activity.   Skin: Skin is warm, dry and intact.   AICD upper left chest wall   Psychiatric: He has a normal mood and affect. His speech is normal. Thought content normal. Cognition and memory are normal.         CRANIAL NERVES     CN III, IV, VI   Pupils are equal, round, and reactive to light.  Extraocular  motions are normal.        Significant Labs: All pertinent labs within the past 24 hours have been reviewed.    Significant Imaging: I have reviewed all pertinent imaging results/findings within the past 24 hours.

## 2020-02-28 NOTE — ASSESSMENT & PLAN NOTE
Patient counseled for 5 minutes on the dangers of unhealthy lifestyle choices including smoking, long term risk of inappropriate choices, as well as advantages to behavior modification. Offered nicotine patch while in the hospital - Continue counseling per nursing  -Smoking cessation  -Nursing [employer] Diet resources

## 2020-02-28 NOTE — ASSESSMENT & PLAN NOTE
Device interrogation showed nonsustained V-tach on couple of occasions last 1 day prior to admit.  Seen by Cardiology device interrogated here ---will defer back to Dr. rae

## 2020-02-28 NOTE — ASSESSMENT & PLAN NOTE
Per cards Recent cath 1/2020 med rx planned;  On amiodarone 200, Coreg 12.5, lisinopril, spironolactone  Known severe LV dysfxn  Hx Bi[employer] ICD, will interrogate today given pt report of LOC (has had this mult times in the past).  Cont med rx/GDMT  No plan for repeat echo/cath

## 2020-02-28 NOTE — H&P
[facility] Medicine  History & Physical    Patient Name: Abdoul Ochoa  MRN: [id number]  Admission Date: 2/28/2020  Attending Physician: Ja Anthony MD   Primary Care Provider: Gold Turcios MD         Patient information was obtained from patient and ER records.     Subjective:     Principal Problem:Acute on chronic combined systolic and diastolic congestive heart failure    Chief Complaint:   Chief Complaint   Patient presents with    Shortness of Breath     pt c/o SOB x 2 days; hx of CHF; swollen face and abdomen / ascites; denies home O2 use; denies CP        HPI: Abdoul Ochoa is a 54 y.o. patient with An extensive PMHx  Which includes but is not limited to CAD, hypertension, ischemic cardiomyopathy,  Mixed hyperlipidemia, CVA.  He presented for evaluation of shortness of breath x2 days with associated orthopnea and lower extremity edema.  He was found to have lower extremity edema and was started on IV Lasix for fluid volume overload.  Patient also complained of intermittent sharp mid sternal non radiating chest pain.  In early morning he complained of midsternal chest pain and was administered nitroglycerin which improved but not relieved his pain.  Pain was relieved with 1 dose of morphine.      Last 2D echo showed LVEF of 30% with grade 3 diastolic dysfunction.  His BNP was noted to be 45,000 and troponin level 0.829.  Placed in observation for ACS rule out.    Past Medical History:   Diagnosis Date    AICD (automatic cardioverter/defibrillator) present 08/05/2019    Alcoholic intoxication without complication     Anemia in stage 3 chronic kidney disease 1/18/2018    CHF (congestive heart failure)     CKD stage 3 7/31/2019    Coronary artery disease     Coronary artery disease of native artery of native heart with stable angina pectoris 1/18/2018    Essential hypertension 1/17/2018    History of CVA (cerebrovascular accident) 1/18/2018    Ischemic  cardiomyopathy 7/27/2019    MI (myocardial infarction)     2013, 2015, 2016, 2018 (total of 4 stents), triple CABG July 26, 2019    Mixed hyperlipidemia 1/17/2018    Nonsustained ventricular tachycardia 7/31/2019    Pneumonia of both lungs due to infectious organism        Past Surgical History:   Procedure Laterality Date    BYPASS GRAFT      CARDIAC DEFIBRILLATOR PLACEMENT Left 08/05/2019    CARDIAC SURGERY  07/26/2019    three vessel CABG    CORONARY ANGIOGRAPHY INCLUDING BYPASS GRAFTS WITH CATHETERIZATION OF LEFT HEART N/A 7/26/2019    Procedure: ANGIOGRAM, CORONARY, INCLUDING BYPASS GRAFT, WITH LEFT HEART CATHETERIZATION;  Surgeon: Chadwick Ramirez MD;  Location: [facility] CATH LAB;  Service: Cardiology;  Laterality: N/A;  rt groin, 5 fr, visipaque    CORONARY ANGIOPLASTY WITH STENT PLACEMENT  01/18/2018    4 stents    TRACHEOSTOMY CLOSURE  2014       Review of patient's allergies indicates:  No Known Allergies    No current facility-administered medications on file prior to encounter.      Current Outpatient Medications on File Prior to Encounter   Medication Sig    amiodarone (PACERONE) 200 MG Tab Take 1 tablet (200 mg total) by mouth once daily.    aspirin 81 MG Chew Take 81 mg by mouth once daily.    atorvastatin (LIPITOR) 80 MG tablet Take 1 tablet (80 mg total) by mouth once daily.    carvedilol (COREG) 12.5 MG tablet Take 1 tablet (12.5 mg total) by mouth 2 (two) times daily.    furosemide (LASIX) 40 MG tablet Take 1 tablet (40 mg total) by mouth 2 (two) times daily.    isosorbide mononitrate (IMDUR) 120 MG 24 hr tablet Take 2 tablets (240 mg total) by mouth once daily.    lisinopril (PRINIVIL,ZESTRIL) 40 MG tablet Take 1 tablet (40 mg total) by mouth once daily.    nitroGLYCERIN (NITROSTAT) 0.4 MG SL tablet Place 1 tablet (0.4 mg total) under the tongue every 5 (five) minutes as needed for Chest pain.    spironolactone (ALDACTONE) 25 MG tablet Take 1 tablet (25 mg total) by mouth once  "daily.    clopidogrel (PLAVIX) 75 mg tablet Take 1 tablet (75 mg total) by mouth once daily.     Family History     Problem Relation (Age of Onset)    Heart disease Mother, Father    Hypertension Mother, Father        Tobacco Use    Smoking status: Former Smoker     Types: Cigarettes     Last attempt to quit: 2019     Years since quittin.7    Smokeless tobacco: Never Used   Substance and Sexual Activity    Alcohol use: Yes     Frequency: Never     Comment: beer on the weekends "barely"    Drug use: Yes     Types: Marijuana     Comment: "every now and then"    Sexual activity: Yes     Partners: Female     Review of Systems   Constitutional: Negative for appetite change, chills, diaphoresis and fever.   HENT: Negative for congestion, hearing loss, sore throat, tinnitus and trouble swallowing.    Eyes: Negative for photophobia, discharge, itching and visual disturbance.   Respiratory: Positive for chest tightness and shortness of breath. Negative for apnea, cough, wheezing and stridor.    Cardiovascular: Positive for chest pain and leg swelling. Negative for palpitations.   Gastrointestinal: Negative for abdominal distention, abdominal pain, blood in stool, constipation, diarrhea and nausea.   Endocrine: Negative for polydipsia, polyphagia and polyuria.   Genitourinary: Negative for difficulty urinating, dysuria, flank pain and frequency.   Musculoskeletal: Negative for arthralgias, joint swelling and neck stiffness.   Skin: Negative for color change, rash and wound.   Neurological: Negative for dizziness, tremors, seizures, light-headedness, numbness and headaches.   Hematological: Negative for adenopathy.   Psychiatric/Behavioral: Negative for hallucinations and self-injury.     Objective:     Vital Signs (Most Recent):  Temp: 97.7 °F (36.5 °C) (20 1110)  Pulse: (!) 55 (20 1110)  Resp: 17 (20 1110)  BP: 120/80 (20 1110)  SpO2: 98 % (20) Vital Signs (24h Range):  Temp: "  [97.5 °F (36.4 °C)-97.9 °F (36.6 °C)] 97.7 °F (36.5 °C)  Pulse:  [55-74] 55  Resp:  [17-20] 17  SpO2:  [98 %-100 %] 98 %  BP: (120-190)/() 120/80     Weight: 77.5 kg (170 lb 13.7 oz)  Body mass index is 27.58 kg/m².    Physical Exam   Constitutional: He is oriented to person, place, and time. He appears well-developed and well-nourished. He is cooperative.   HENT:   Head: Normocephalic and atraumatic.   Eyes: Pupils are equal, round, and reactive to light. Conjunctivae, EOM and lids are normal.   Neck: Full passive range of motion without pain. Neck supple. No JVD present. No edema present. No thyroid mass present.   Cardiovascular: S1 normal, S2 normal and intact distal pulses.   No murmur heard.  Abdominal: Soft. Bowel sounds are normal. He exhibits no distension and no abdominal bruit. There is no splenomegaly or hepatomegaly. There is no tenderness. There is no CVA tenderness.   Lymphadenopathy:     He has no cervical adenopathy.     He has no axillary adenopathy.   Neurological: He is alert and oriented to person, place, and time. He has normal reflexes. He displays no tremor. He displays no seizure activity.   Skin: Skin is warm, dry and intact.   AICD upper left chest wall   Psychiatric: He has a normal mood and affect. His speech is normal. Thought content normal. Cognition and memory are normal.         CRANIAL NERVES     CN III, IV, VI   Pupils are equal, round, and reactive to light.  Extraocular motions are normal.        Significant Labs: All pertinent labs within the past 24 hours have been reviewed.    Significant Imaging: I have reviewed all pertinent imaging results/findings within the past 24 hours.    Assessment/Plan:     * Acute on chronic combined systolic and diastolic congestive heart failure   He was diuresed overnight.  His symptoms  Complicated by poorly controlled hypertension and noncompliance.  In early morning he had chest pain that was not relieved by nitroglycerin  But was  "relieved by morphine.  He had chest x-ray suggestive of pulmonary edema with right pleural effusion.  -  Amiodarone 200 / Coreg 12.5 /Lasix 40 IV/imdur   -Renal US "There is insignificant stenosis (0-59%) in the Right Renal Artery"     discharge instructions   Follow up with Dr. Lemus 1-2 weeks  Outpatient holter/EVM, will consider EPS eval  Driving restriction for 3 months (d/w pt/wife).        Syncope   Device interrogation showed nonsustained V-tach on couple of occasions last 1 day prior to admit.  Seen by Cardiology device interrogated here ---will defer back to Dr. rae      Coronary artery disease involving coronary bypass graft of native heart with unstable angina pectoris  Per cards Recent cath 1/2020 med rx planned;  On amiodarone 200, Coreg 12.5, lisinopril, spironolactone  Known severe LV dysfxn  Hx Biotronik ICD, will interrogate today given pt report of LOC (has had this mult times in the past).  Cont med rx/GDMT  No plan for repeat echo/cath         ICD (implantable cardioverter-defibrillator), single, in situ   Device to interrogation which showed a couple of episodes and 9 sustained V-tach.   rep advises that his device is set to 32 beats before goes off so he could have likely been having shorter episodes.  He was evaluated by Cardiology who ordered renal ultrasound found ....... tight blood pressure control diet ; fluid restriction      Elevated troponin  See above      CKD stage 3  Renal function at patient baseline. Will follow labs, renally dose medications, avoid nephrotoxic agent      Tobacco abuse  Patient counseled for 5 minutes on the dangers of unhealthy lifestyle choices including smoking, long term risk of inappropriate choices, as well as advantages to behavior modification. Offered nicotine patch while in the hospital - Continue counseling per nursing  -Smoking cessation  -Nursing [employer] Diet resources        Mixed hyperlipidemia   Continue home meds lipid panel      Essential " hypertension    Continue home meds; very poor complete control      VTE Risk Mitigation (From admission, onward)         Ordered     enoxaparin injection 40 mg  Daily      02/28/20 0621     IP VTE HIGH RISK PATIENT  Once      02/28/20 0501     Place CHAPITO hose  Until discontinued      02/28/20 0501                  NAHOMI Delarosa, FNP-C  Hospitalist - Department of Hospital Medicine  [address], [place] [address]  Office [phone]; Pager [phone]

## 2020-02-28 NOTE — SUBJECTIVE & OBJECTIVE
Past Medical History:   Diagnosis Date    AICD (automatic cardioverter/defibrillator) present 08/05/2019    Alcoholic intoxication without complication     Anemia in stage 3 chronic kidney disease 1/18/2018    CHF (congestive heart failure)     CKD stage 3 7/31/2019    Coronary artery disease     Coronary artery disease of native artery of native heart with stable angina pectoris 1/18/2018    Essential hypertension 1/17/2018    History of CVA (cerebrovascular accident) 1/18/2018    Ischemic cardiomyopathy 7/27/2019    MI (myocardial infarction)     2013, 2015, 2016, 2018 (total of 4 stents), triple CABG July 26, 2019    Mixed hyperlipidemia 1/17/2018    Nonsustained ventricular tachycardia 7/31/2019    Pneumonia of both lungs due to infectious organism        Past Surgical History:   Procedure Laterality Date    BYPASS GRAFT      CARDIAC DEFIBRILLATOR PLACEMENT Left 08/05/2019    CARDIAC SURGERY  07/26/2019    three vessel CABG    CORONARY ANGIOGRAPHY INCLUDING BYPASS GRAFTS WITH CATHETERIZATION OF LEFT HEART N/A 7/26/2019    Procedure: ANGIOGRAM, CORONARY, INCLUDING BYPASS GRAFT, WITH LEFT HEART CATHETERIZATION;  Surgeon: Chadwick Ramirez MD;  Location: [facility] CATH LAB;  Service: Cardiology;  Laterality: N/A;  rt groin, 5 fr, visipaque    CORONARY ANGIOPLASTY WITH STENT PLACEMENT  01/18/2018    4 stents    TRACHEOSTOMY CLOSURE  2014       Review of patient's allergies indicates:  No Known Allergies    No current facility-administered medications on file prior to encounter.      Current Outpatient Medications on File Prior to Encounter   Medication Sig    amiodarone (PACERONE) 200 MG Tab Take 1 tablet (200 mg total) by mouth once daily.    aspirin 81 MG Chew Take 81 mg by mouth once daily.    atorvastatin (LIPITOR) 80 MG tablet Take 1 tablet (80 mg total) by mouth once daily.    carvedilol (COREG) 12.5 MG tablet Take 1 tablet (12.5 mg total) by mouth 2 (two) times daily.    furosemide (LASIX)  "40 MG tablet Take 1 tablet (40 mg total) by mouth 2 (two) times daily.    isosorbide mononitrate (IMDUR) 120 MG 24 hr tablet Take 2 tablets (240 mg total) by mouth once daily.    lisinopril (PRINIVIL,ZESTRIL) 40 MG tablet Take 1 tablet (40 mg total) by mouth once daily.    nitroGLYCERIN (NITROSTAT) 0.4 MG SL tablet Place 1 tablet (0.4 mg total) under the tongue every 5 (five) minutes as needed for Chest pain.    spironolactone (ALDACTONE) 25 MG tablet Take 1 tablet (25 mg total) by mouth once daily.    clopidogrel (PLAVIX) 75 mg tablet Take 1 tablet (75 mg total) by mouth once daily.     Family History     Problem Relation (Age of Onset)    Heart disease Mother, Father    Hypertension Mother, Father        Tobacco Use    Smoking status: Former Smoker     Types: Cigarettes     Last attempt to quit: 2019     Years since quittin.7    Smokeless tobacco: Never Used   Substance and Sexual Activity    Alcohol use: Yes     Frequency: Never     Comment: beer on the weekends "barely"    Drug use: Yes     Types: Marijuana     Comment: "every now and then"    Sexual activity: Yes     Partners: Female     Review of Systems   Constitution: Negative for chills, diaphoresis, fever and malaise/fatigue.   HENT: Negative for nosebleeds.    Eyes: Negative for blurred vision and double vision.   Cardiovascular: Positive for chest pain, leg swelling and syncope. Negative for claudication, cyanosis, dyspnea on exertion, orthopnea, palpitations and paroxysmal nocturnal dyspnea.   Respiratory: Positive for shortness of breath. Negative for cough and wheezing.    Skin: Negative for dry skin and poor wound healing.   Musculoskeletal: Negative for back pain, joint swelling and myalgias.   Gastrointestinal: Negative for abdominal pain, nausea and vomiting.   Genitourinary: Negative for hematuria.   Neurological: Negative for dizziness, headaches, numbness, seizures and weakness.   Psychiatric/Behavioral: Negative for altered " mental status and depression.     Objective:     Vital Signs (Most Recent):  Temp: 97.9 °F (36.6 °C) (02/28/20 0738)  Pulse: 63 (02/28/20 0738)  Resp: 17 (02/28/20 0738)  BP: (!) 147/105 (02/28/20 0738)  SpO2: 100 % (02/28/20 0738) Vital Signs (24h Range):  Temp:  [97.5 °F (36.4 °C)-97.9 °F (36.6 °C)] 97.9 °F (36.6 °C)  Pulse:  [63-74] 63  Resp:  [17-20] 17  SpO2:  [98 %-100 %] 100 %  BP: (147-190)/(101-120) 147/105     Weight: 77.5 kg (170 lb 13.7 oz)  Body mass index is 27.58 kg/m².    SpO2: 100 %  O2 Device (Oxygen Therapy): nasal cannula      Intake/Output Summary (Last 24 hours) at 2/28/2020 0844  Last data filed at 2/28/2020 0810  Gross per 24 hour   Intake --   Output 350 ml   Net -350 ml       Lines/Drains/Airways     Peripheral Intravenous Line                 Peripheral IV - Single Lumen 02/28/20 20 G Left Hand less than 1 day                Physical Exam   Constitutional: He is oriented to person, place, and time. He appears well-developed and well-nourished.   HENT:   Head: Normocephalic and atraumatic.   Eyes: Pupils are equal, round, and reactive to light. Conjunctivae and EOM are normal. No scleral icterus.   Neck: Normal range of motion. Neck supple. No JVD present. Carotid bruit is not present. No tracheal deviation present. No thyromegaly present.   Cardiovascular: Normal rate, regular rhythm, S1 normal and S2 normal. Exam reveals distant heart sounds. Exam reveals no gallop and no friction rub.   No murmur heard.  Pulmonary/Chest: Effort normal. No respiratory distress. He has no wheezes. He has no rales. He exhibits no tenderness.   Dec BS R base  L ICD in place   Abdominal: Soft. He exhibits no distension.   Musculoskeletal: He exhibits no edema.   Neurological: He is alert and oriented to person, place, and time. He has normal strength. No cranial nerve deficit.   Skin: Skin is warm and dry. No rash noted.   Psychiatric: He has a normal mood and affect. His behavior is normal.       Current  Medications:   amiodarone  200 mg Oral Daily    aspirin  81 mg Oral Daily    atorvastatin  80 mg Oral Daily    carvediloL  12.5 mg Oral BID    clopidogreL  75 mg Oral Daily    enoxaparin  40 mg Subcutaneous Daily    famotidine  20 mg Oral Daily    furosemide (LASIX) IV  40 mg Intravenous Q12H    isosorbide mononitrate  240 mg Oral Daily    lisinopril  40 mg Oral Daily    spironolactone  25 mg Oral Daily       acetaminophen, nitroGLYCERIN, sodium chloride 0.9%    Laboratory (all labs reviewed):  CBC:  Recent Labs   Lab 01/01/20  0925 01/02/20  0335 01/03/20  0340 01/03/20  1611 02/28/20  0210   WBC 5.91 5.89 4.73 4.64 4.98   Hemoglobin 11.2 L 10.8 L 9.8 L 9.9 L 10.9 L   Hematocrit 36.9 L 35.3 L 33.3 L 33.0 L 35.4 L   Platelets 314 279 232 220 206       CHEMISTRIES:  Recent Labs   Lab 08/06/19  0319  01/02/20  0335 01/02/20  1150 01/03/20  0340 01/03/20  1611 01/03/20  2034 02/28/20  0210   Glucose 113 H   < > 109 75 103 104  --  107   Sodium 135 L   < > 140 138 139 138  --  140   Potassium 3.8   < > 3.1 L 3.6 3.6 3.5  --  3.1 L   BUN, Bld 23 H   < > 23 H 20 21 H 22 H  --  23 H   Creatinine 1.9 H   < > 1.5 H 1.3 1.5 H 1.5 H  --  1.5 H   eGFR if  44.6 A   < > 59 A >60 59 A 59 A  --  59 A   eGFR if non  38.6 A   < > 51 A >60 51 A 51 A  --  51 A   Calcium 8.7   < > 9.1 8.3 L 8.8 8.6 L  --  9.5   Magnesium 2.1  --  1.5 L 1.7  --   --  1.8 1.5 L    < > = values in this interval not displayed.       CARDIAC BIOMARKERS:  Recent Labs   Lab 01/18/18  0008  07/25/19  1319  10/28/19  0230  01/04/20  0344 01/04/20  0747 01/04/20  1136 02/28/20  0210 02/28/20  0639   CPK 84  --  235 H  235 H  --  71  --   --   --   --   --  85   CPK MB 2.1  --  3.2  --   --   --   --   --   --   --   --    Troponin I 1.291 H   < > 1.684 H   < >  --    < > 0.853 H 0.913 H 0.759 H 0.840 H 0.829 H    < > = values in this interval not displayed.       COAGS:  Recent Labs   Lab 01/17/18  0636 08/05/19  0632  10/27/19  1757 01/01/20  0925   INR 1.1 1.1 1.1 1.3 H       LIPIDS/LFTS:  Recent Labs   Lab 01/18/18  0254  07/30/19  2232  10/29/19  0358  01/01/20  0925 01/02/20  0335 01/03/20  0340 01/03/20  1611 02/28/20  0210   Cholesterol 156  --  116 L  --  123  --   --   --   --   --   --    Triglycerides 81  --  71  --  66  --   --   --   --   --   --    HDL 38 L  --  11 L  --  22 L  --   --   --   --   --   --    LDL Cholesterol 101.8  --  90.8  --  87.8  --   --   --   --   --   --    Non-HDL Cholesterol 118  --  105  --  101  --   --   --   --   --   --    AST 29   < >  --    < >  --    < > 35 28 26 22 45 H   ALT 33   < >  --    < >  --    < > 31 28 23 20 70 H    < > = values in this interval not displayed.       BNP:  Recent Labs   Lab 10/27/19  1757 10/30/19  1716 01/01/20  0925 01/03/20  1611 02/28/20  0210   BNP 3,317 H 969 H 3,693 H 1,077 H 4,501 H       TSH:  Recent Labs   Lab 01/03/20  2034   TSH 2.439       Free T4:        Diagnostic Results:  ECG (personally reviewed and interpreted tracing(s)):  2/28/20 0114 SR 65, IMI-age indet, PRWP, lat ST abnl ?isch, similar to 1/3/20    Chest X-Ray (personally reviewed and interpreted image(s)): 2/28/20 R effusion, L ICD 1 lead.    Echo: 1/6/20 ([facility], Care Everywhere)    enlarged right heart     ICD wire in right heart     TR  moderate     PAsys ~ 56 mmHg     LA enlarged (LAD measures 35 mm)     mild MAC / at least MR      enlarged LV with global systolic dysfunction      EF measures 20% (visually appears < 15%)     grade III DD     aortic annulus/proximal root sclerosis     no AS / minimal AR     no pericardial effusion      unable assess for pleural effusions     Coronary Angio 01/07/2020 ([facility], Care Everywhere Elise note)  FINDINGS:   LM:  Normal with no significant disease  LAD:  Diffusely disease 75% mid vessel lesion is competitive flow from patent LIMA   CX:  Diffusely disease with high-grade proximal 80% lesion  RCA:  100% occluded in the midportion  just prior to previously placed stent. Unchanged from angiography in 2018. Distal PDA supplied via contralateral collaterals.  GRAFTS:  LIMA-LAD:  Patent with no significant disease  SVG-OM1:  Patent with mild luminal irregularities in the proximal portion no significant disease distally  SVG-D1:  Patent with no significant disease  CONCLUSIONS:   Three-vessel disease with 100% occlusion of the the native RCA.  Patent LIMA and bypass grafts.  Unchanged from previous evaluation in 2018. Likely etiology of patient's low-level cardiac enzymes 00s hypertensive crisis given cardiomyopathy and blood pressure of 1 .  Nitroglycerin was discontinued and Cardene was started for better blood pressure management.  Sheath will be pulled once blood pressure better controlled. He is otherwise asymptomatic and groin is clear. Continue aggressive risk factor modification.    L MPI 11/27/19 ([facility], Care Everywhere)  Myocardial perfusion imaging is abnormal.    There is a large area of infarction in the inferior region.    Severely reduced left ventricular systolic function.      10/29/19 (images prev personally reviewed and interpreted; repeat exam in care everywhere 11/27/19 notes similar findings)    Abnormal Luann myocardial perfusion study.    There is a large sized, severe intensity, fixed defect in the basal to apical inferior wall(s) in the typical distribution of the RCA territory.    Gated perfusion images showed an ejection fraction of 24 % post stress.    There is severe global hypokinesis at stress.    There is basal to apical inferior wall akinesis at stress.    LV cavity size is  and moderately enlarged at stress.

## 2020-02-28 NOTE — HPI
Patient presents for evaluation of worsening dyspnea and orthopnea for 3 days.  Patient has history of significant congestive heart failure with an ejection fraction of 30% on last echocardiogram.  Patient states that he feels edematous in his abdomen and in his face.  He states that his chest feels tight.  Complaining of intermittent sharp pains around his cardiac defibrillator.  He denies palpitations.  He denies syncope.  He denies lightheadedness.  No abdominal pain.  He admits 1 episode of vomiting yesterday.  No diarrhea.  No back pain. No focal neurologic symptoms.    Pt follows with Dr. Olivares/[facility].  Had cor angio 1/2020 with plans for med rx.    The patient is seen on the observation unit.  He is awoken from sleep.  Apparently, he was admitted with symptoms of heart failure and edema.  He was noted to be significantly hypertensive.  He denies any medication non adherence.  He did have some chest discomfort overnight, but apparently none at present.  When I queried him about this, he was not have driving much in the way of details.  Review of his record notes a recent coronary angiogram in January 2020 with plans for medical therapy.  His grafts were patent and he has a chronically occluded right coronary.  He does have a known ischemic cardiomyopathy with Biotronik ICD in place.  I have asked them to come to interrogate his defibrillator given the patient's report of passing out several times over the past several days.  /110 on arrival.

## 2020-02-28 NOTE — NURSING
Patient used urinal then complaints of chest pain 10/10. Patient became sweaty and short of breath. Blood pressure went up 190/120 .Nitro S/L given.        Patient latest /107. Chest pain still 8/10.

## 2020-02-28 NOTE — ASSESSMENT & PLAN NOTE
Recent cath 1/2020  RCA and patent LIMA/SVGx2, med rx planned.  Known severe LV dysfxn  Hx Biotronik ICD, will interrogate today given pt report of LOC (has had this mult times in the past).  Cont med rx/GDMT  No plan for repeat echo/cath

## 2020-02-28 NOTE — HPI
Abdoul Ochoa is a 54 y.o. patient with An extensive PMHx  Which includes but is not limited to CAD, hypertension, ischemic cardiomyopathy,  Mixed hyperlipidemia, CVA.  He presented for evaluation of shortness of breath x2 days with associated orthopnea and lower extremity edema.  He was found to have lower extremity edema and was started on IV Lasix for fluid volume overload.  Patient also complained of intermittent sharp mid sternal non radiating chest pain.  In early morning he complained of midsternal chest pain and was administered nitroglycerin which improved but not relieved his pain.  Pain was relieved with 1 dose of morphine.      Last 2D echo showed LVEF of 30% with grade 3 diastolic dysfunction.  His BNP was noted to be 45,000 and troponin level 0.829.  Placed in observation for ACS rule out.

## 2020-02-28 NOTE — NURSING
4:28 Received report from JUAN Trotter. Patient in ED      5:15 Patient arrived from ED. Alert and oriented. Complaints of left sided chest pain 8/10 (patient stated he had it for 3 days), no sign of distress. Nitro paste patch changed, PRN tylenol given. IV line intact over Left hand - saline locked. On 2L of oxygen. Cardiac monitor in place. Call bell given on his reach, instructed to call for assistance all the time. Bed on lowest position. Bed alarm on. Urinal provided. Light adjusted. Safety maintained.

## 2020-02-28 NOTE — PLAN OF CARE
[id number] - [place] - [address]  [address]  [place] [address]  Phone: [phone] Fax: [phone]       02/28/20 1156   Discharge Assessment   Assessment Type Discharge Planning Assessment   Confirmed/corrected address and phone number on facesheet? Yes   Assessment information obtained from? Patient   Expected Length of Stay (days) 2   Communicated expected length of stay with patient/caregiver yes   Prior to hospitilization cognitive status: Alert/Oriented   Prior to hospitalization functional status: Independent   Current cognitive status: Alert/Oriented   Current Functional Status: Independent   Lives With sibling(s)   Able to Return to Prior Arrangements yes   Is patient able to care for self after discharge? Yes   Patient's perception of discharge disposition home or selfcare   Readmission Within the Last 30 Days no previous admission in last 30 days   Patient currently being followed by outpatient case management? No   Patient currently receives any other outside agency services? No   Equipment Currently Used at Home none   Do you have any problems affording any of your prescribed medications? No   Is the patient taking medications as prescribed? yes   Does the patient have transportation home? No   Does the patient receive services at the [facility]? No   Discharge Plan A Home with family   DME Needed Upon Discharge  none   Patient/Family in Agreement with Plan yes

## 2020-02-28 NOTE — ASSESSMENT & PLAN NOTE
Hx mult syncopal episodes without evid of vent arrhythmia  Will interrogate Biotronik ICD today  Have prev suggested device upgrade to dual chamber device to prevent bradycardia (although no evidence of bradycardia at present).

## 2020-02-28 NOTE — CONSULTS
[facility]  Cardiology  Consult Note    Patient Name: Abdoul Ochoa  MRN: [id number]  Admission Date: 2/28/2020  Hospital Length of Stay: 0 days  Code Status: Full Code   Attending Provider: Ja Anthony MD   Consulting Provider: Aftab Lemus MD  Primary Care Physician: Gold Turcios MD  Principal Problem:Acute on chronic combined systolic and diastolic congestive heart failure    Patient information was obtained from patient and ER records.     Inpatient consult to Cardiology  Consult performed by: Aftab Lemus MD  Consult ordered by: CONI Delarosa  Reason for consult: CHF/CAD/ICM/HTN urg        Subjective:     Chief Complaint:  SOB     HPI:   Patient presents for evaluation of worsening dyspnea and orthopnea for 3 days.  Patient has history of significant congestive heart failure with an ejection fraction of 30% on last echocardiogram.  Patient states that he feels edematous in his abdomen and in his face.  He states that his chest feels tight.  Complaining of intermittent sharp pains around his cardiac defibrillator.  He denies palpitations.  He denies syncope.  He denies lightheadedness.  No abdominal pain.  He admits 1 episode of vomiting yesterday.  No diarrhea.  No back pain. No focal neurologic symptoms.    Pt follows with Dr. Olivares/[facility].  Had cor angio 1/2020 with plans for med rx.    The patient is seen on the observation unit.  He is awoken from sleep.  Apparently, he was admitted with symptoms of heart failure and edema.  He was noted to be significantly hypertensive.  He denies any medication non adherence.  He did have some chest discomfort overnight, but apparently none at present.  When I queried him about this, he was not have driving much in the way of details.  Review of his record notes a recent coronary angiogram in January 2020 with plans for medical therapy.  His grafts were patent and he has a chronically occluded right coronary.  He does  have a known ischemic cardiomyopathy with Biotronik ICD in place.  I have asked them to come to interrogate his defibrillator given the patient's report of passing out several times over the past several days.  /110 on arrival.    Past Medical History:   Diagnosis Date    AICD (automatic cardioverter/defibrillator) present 08/05/2019    Alcoholic intoxication without complication     Anemia in stage 3 chronic kidney disease 1/18/2018    CHF (congestive heart failure)     CKD stage 3 7/31/2019    Coronary artery disease     Coronary artery disease of native artery of native heart with stable angina pectoris 1/18/2018    Essential hypertension 1/17/2018    History of CVA (cerebrovascular accident) 1/18/2018    Ischemic cardiomyopathy 7/27/2019    MI (myocardial infarction)     2013, 2015, 2016, 2018 (total of 4 stents), triple CABG July 26, 2019    Mixed hyperlipidemia 1/17/2018    Nonsustained ventricular tachycardia 7/31/2019    Pneumonia of both lungs due to infectious organism        Past Surgical History:   Procedure Laterality Date    BYPASS GRAFT      CARDIAC DEFIBRILLATOR PLACEMENT Left 08/05/2019    CARDIAC SURGERY  07/26/2019    three vessel CABG    CORONARY ANGIOGRAPHY INCLUDING BYPASS GRAFTS WITH CATHETERIZATION OF LEFT HEART N/A 7/26/2019    Procedure: ANGIOGRAM, CORONARY, INCLUDING BYPASS GRAFT, WITH LEFT HEART CATHETERIZATION;  Surgeon: Chadwick Ramirez MD;  Location: [facility] CATH LAB;  Service: Cardiology;  Laterality: N/A;  rt groin, 5 fr, visipaque    CORONARY ANGIOPLASTY WITH STENT PLACEMENT  01/18/2018    4 stents    TRACHEOSTOMY CLOSURE  2014       Review of patient's allergies indicates:  No Known Allergies    No current facility-administered medications on file prior to encounter.      Current Outpatient Medications on File Prior to Encounter   Medication Sig    amiodarone (PACERONE) 200 MG Tab Take 1 tablet (200 mg total) by mouth once daily.    aspirin 81 MG Chew Take  "81 mg by mouth once daily.    atorvastatin (LIPITOR) 80 MG tablet Take 1 tablet (80 mg total) by mouth once daily.    carvedilol (COREG) 12.5 MG tablet Take 1 tablet (12.5 mg total) by mouth 2 (two) times daily.    furosemide (LASIX) 40 MG tablet Take 1 tablet (40 mg total) by mouth 2 (two) times daily.    isosorbide mononitrate (IMDUR) 120 MG 24 hr tablet Take 2 tablets (240 mg total) by mouth once daily.    lisinopril (PRINIVIL,ZESTRIL) 40 MG tablet Take 1 tablet (40 mg total) by mouth once daily.    nitroGLYCERIN (NITROSTAT) 0.4 MG SL tablet Place 1 tablet (0.4 mg total) under the tongue every 5 (five) minutes as needed for Chest pain.    spironolactone (ALDACTONE) 25 MG tablet Take 1 tablet (25 mg total) by mouth once daily.    clopidogrel (PLAVIX) 75 mg tablet Take 1 tablet (75 mg total) by mouth once daily.     Family History     Problem Relation (Age of Onset)    Heart disease Mother, Father    Hypertension Mother, Father        Tobacco Use    Smoking status: Former Smoker     Types: Cigarettes     Last attempt to quit: 2019     Years since quittin.7    Smokeless tobacco: Never Used   Substance and Sexual Activity    Alcohol use: Yes     Frequency: Never     Comment: beer on the weekends "barely"    Drug use: Yes     Types: Marijuana     Comment: "every now and then"    Sexual activity: Yes     Partners: Female     Review of Systems   Constitution: Negative for chills, diaphoresis, fever and malaise/fatigue.   HENT: Negative for nosebleeds.    Eyes: Negative for blurred vision and double vision.   Cardiovascular: Positive for chest pain, leg swelling and syncope. Negative for claudication, cyanosis, dyspnea on exertion, orthopnea, palpitations and paroxysmal nocturnal dyspnea.   Respiratory: Positive for shortness of breath. Negative for cough and wheezing.    Skin: Negative for dry skin and poor wound healing.   Musculoskeletal: Negative for back pain, joint swelling and myalgias. "   Gastrointestinal: Negative for abdominal pain, nausea and vomiting.   Genitourinary: Negative for hematuria.   Neurological: Negative for dizziness, headaches, numbness, seizures and weakness.   Psychiatric/Behavioral: Negative for altered mental status and depression.     Objective:     Vital Signs (Most Recent):  Temp: 97.9 °F (36.6 °C) (02/28/20 0738)  Pulse: 63 (02/28/20 0738)  Resp: 17 (02/28/20 0738)  BP: (!) 147/105 (02/28/20 0738)  SpO2: 100 % (02/28/20 0738) Vital Signs (24h Range):  Temp:  [97.5 °F (36.4 °C)-97.9 °F (36.6 °C)] 97.9 °F (36.6 °C)  Pulse:  [63-74] 63  Resp:  [17-20] 17  SpO2:  [98 %-100 %] 100 %  BP: (147-190)/(101-120) 147/105     Weight: 77.5 kg (170 lb 13.7 oz)  Body mass index is 27.58 kg/m².    SpO2: 100 %  O2 Device (Oxygen Therapy): nasal cannula      Intake/Output Summary (Last 24 hours) at 2/28/2020 0844  Last data filed at 2/28/2020 0810  Gross per 24 hour   Intake --   Output 350 ml   Net -350 ml       Lines/Drains/Airways     Peripheral Intravenous Line                 Peripheral IV - Single Lumen 02/28/20 20 G Left Hand less than 1 day                Physical Exam   Constitutional: He is oriented to person, place, and time. He appears well-developed and well-nourished.   HENT:   Head: Normocephalic and atraumatic.   Eyes: Pupils are equal, round, and reactive to light. Conjunctivae and EOM are normal. No scleral icterus.   Neck: Normal range of motion. Neck supple. No JVD present. Carotid bruit is not present. No tracheal deviation present. No thyromegaly present.   Cardiovascular: Normal rate, regular rhythm, S1 normal and S2 normal. Exam reveals distant heart sounds. Exam reveals no gallop and no friction rub.   No murmur heard.  Pulmonary/Chest: Effort normal. No respiratory distress. He has no wheezes. He has no rales. He exhibits no tenderness.   Dec BS R base  L ICD in place   Abdominal: Soft. He exhibits no distension.   Musculoskeletal: He exhibits no edema.    Neurological: He is alert and oriented to person, place, and time. He has normal strength. No cranial nerve deficit.   Skin: Skin is warm and dry. No rash noted.   Psychiatric: He has a normal mood and affect. His behavior is normal.       Current Medications:   amiodarone  200 mg Oral Daily    aspirin  81 mg Oral Daily    atorvastatin  80 mg Oral Daily    carvediloL  12.5 mg Oral BID    clopidogreL  75 mg Oral Daily    enoxaparin  40 mg Subcutaneous Daily    famotidine  20 mg Oral Daily    furosemide (LASIX) IV  40 mg Intravenous Q12H    isosorbide mononitrate  240 mg Oral Daily    lisinopril  40 mg Oral Daily    spironolactone  25 mg Oral Daily       acetaminophen, nitroGLYCERIN, sodium chloride 0.9%    Laboratory (all labs reviewed):  CBC:  Recent Labs   Lab 01/01/20  0925 01/02/20  0335 01/03/20  0340 01/03/20  1611 02/28/20  0210   WBC 5.91 5.89 4.73 4.64 4.98   Hemoglobin 11.2 L 10.8 L 9.8 L 9.9 L 10.9 L   Hematocrit 36.9 L 35.3 L 33.3 L 33.0 L 35.4 L   Platelets 314 279 232 220 206       CHEMISTRIES:  Recent Labs   Lab 08/06/19  0319  01/02/20  0335 01/02/20  1150 01/03/20  0340 01/03/20  1611 01/03/20  2034 02/28/20  0210   Glucose 113 H   < > 109 75 103 104  --  107   Sodium 135 L   < > 140 138 139 138  --  140   Potassium 3.8   < > 3.1 L 3.6 3.6 3.5  --  3.1 L   BUN, Bld 23 H   < > 23 H 20 21 H 22 H  --  23 H   Creatinine 1.9 H   < > 1.5 H 1.3 1.5 H 1.5 H  --  1.5 H   eGFR if  44.6 A   < > 59 A >60 59 A 59 A  --  59 A   eGFR if non  38.6 A   < > 51 A >60 51 A 51 A  --  51 A   Calcium 8.7   < > 9.1 8.3 L 8.8 8.6 L  --  9.5   Magnesium 2.1  --  1.5 L 1.7  --   --  1.8 1.5 L    < > = values in this interval not displayed.       CARDIAC BIOMARKERS:  Recent Labs   Lab 01/18/18  0008  07/25/19  1319  10/28/19  0230  01/04/20  0344 01/04/20  0747 01/04/20  1136 02/28/20  0210 02/28/20  0639   CPK 84  --  235 H  235 H  --  71  --   --   --   --   --  85   CPK MB 2.1   --  3.2  --   --   --   --   --   --   --   --    Troponin I 1.291 H   < > 1.684 H   < >  --    < > 0.853 H 0.913 H 0.759 H 0.840 H 0.829 H    < > = values in this interval not displayed.       COAGS:  Recent Labs   Lab 01/17/18  0636 08/05/19  0632 10/27/19  1757 01/01/20  0925   INR 1.1 1.1 1.1 1.3 H       LIPIDS/LFTS:  Recent Labs   Lab 01/18/18  0254  07/30/19  2232  10/29/19  0358  01/01/20  0925 01/02/20  0335 01/03/20  0340 01/03/20  1611 02/28/20  0210   Cholesterol 156  --  116 L  --  123  --   --   --   --   --   --    Triglycerides 81  --  71  --  66  --   --   --   --   --   --    HDL 38 L  --  11 L  --  22 L  --   --   --   --   --   --    LDL Cholesterol 101.8  --  90.8  --  87.8  --   --   --   --   --   --    Non-HDL Cholesterol 118  --  105  --  101  --   --   --   --   --   --    AST 29   < >  --    < >  --    < > 35 28 26 22 45 H   ALT 33   < >  --    < >  --    < > 31 28 23 20 70 H    < > = values in this interval not displayed.       BNP:  Recent Labs   Lab 10/27/19  1757 10/30/19  1716 01/01/20  0925 01/03/20  1611 02/28/20  0210   BNP 3,317 H 969 H 3,693 H 1,077 H 4,501 H       TSH:  Recent Labs   Lab 01/03/20  2034   TSH 2.439       Free T4:        Diagnostic Results:  ECG (personally reviewed and interpreted tracing(s)):  2/28/20 0114 SR 65, IMI-age indet, PRWP, lat ST abnl ?isch, similar to 1/3/20    Chest X-Ray (personally reviewed and interpreted image(s)): 2/28/20 R effusion, L ICD 1 lead.    Echo: 1/6/20 ([facility], Care Everywhere)    enlarged right heart     ICD wire in right heart     TR  moderate     PAsys ~ 56 mmHg     LA enlarged (LAD measures 35 mm)     mild MAC / at least MR      enlarged LV with global systolic dysfunction      EF measures 20% (visually appears < 15%)     grade III DD     aortic annulus/proximal root sclerosis     no AS / minimal AR     no pericardial effusion      unable assess for pleural effusions     Coronary Angio 01/07/2020 ([facility], Care Everywhere  Elise note)  FINDINGS:   LM:  Normal with no significant disease  LAD:  Diffusely disease 75% mid vessel lesion is competitive flow from patent LIMA   CX:  Diffusely disease with high-grade proximal 80% lesion  RCA:  100% occluded in the midportion just prior to previously placed stent. Unchanged from angiography in 2018. Distal PDA supplied via contralateral collaterals.  GRAFTS:  LIMA-LAD:  Patent with no significant disease  SVG-OM1:  Patent with mild luminal irregularities in the proximal portion no significant disease distally  SVG-D1:  Patent with no significant disease  CONCLUSIONS:   Three-vessel disease with 100% occlusion of the the native RCA.  Patent LIMA and bypass grafts.  Unchanged from previous evaluation in 2018. Likely etiology of patient's low-level cardiac enzymes 00s hypertensive crisis given cardiomyopathy and blood pressure of 1 .  Nitroglycerin was discontinued and Cardene was started for better blood pressure management.  Sheath will be pulled once blood pressure better controlled. He is otherwise asymptomatic and groin is clear. Continue aggressive risk factor modification.    L MPI 11/27/19 ([facility], Care Everywhere)  Myocardial perfusion imaging is abnormal.    There is a large area of infarction in the inferior region.    Severely reduced left ventricular systolic function.      10/29/19 (images prev personally reviewed and interpreted; repeat exam in care everywhere 11/27/19 notes similar findings)    Abnormal Luann myocardial perfusion study.    There is a large sized, severe intensity, fixed defect in the basal to apical inferior wall(s) in the typical distribution of the RCA territory.    Gated perfusion images showed an ejection fraction of 24 % post stress.    There is severe global hypokinesis at stress.    There is basal to apical inferior wall akinesis at stress.    LV cavity size is  and moderately enlarged at stress.    Assessment and Plan:     * Acute on chronic  combined systolic and diastolic congestive heart failure  Seems to be presenting issue  Complicated by severe HTN  Pt denies med nonadherence  seemt o be comfortable at persent without SOB/angina  Cont med rx/GDMT  Agree with IV lasix diuresis  Check renal art US    Syncope  Hx mult syncopal episodes without evid of vent arrhythmia  Will interrogate Biotronik ICD today  Have prev suggested device upgrade to dual chamber device to prevent bradycardia (although no evidence of bradycardia at present).    Ischemic cardiomyopathy  Recent cath 1/2020  RCA and patent LIMA/SVGx2, med rx planned.  Known severe LV dysfxn  Hx Biotronik ICD, will interrogate today given pt report of LOC (has had this mult times in the past).  Cont med rx/GDMT  No plan for repeat echo/cath    Mixed hyperlipidemia  Cont statin    Essential hypertension  Cont med rx  ?hypertensive urg, pt denies med nonadherence  Check renal art US    CKD stage 3  Monitor creat with diuresis    ICD (implantable cardioverter-defibrillator), single, in situ  Biotronik    Elevated troponin  Flat pattern  Recent cath 1/2020 with med rx planned    Coronary artery disease involving coronary bypass graft of native heart with unstable angina pectoris  As per isch CMP section  CP/elev trop likely d/t CHF/HTN        VTE Risk Mitigation (From admission, onward)         Ordered     enoxaparin injection 40 mg  Daily      02/28/20 0621     IP VTE HIGH RISK PATIENT  Once      02/28/20 0501     Place CHAPITO hose  Until discontinued      02/28/20 0501                Thank you for your consult. I will follow-up with patient. Please contact us if you have any additional questions.    Aftab Lemus MD  Cardiology   [facility]    Addendum 10am:  Biotronik ICD interrogated  Pt had 20 sec of NSVT at 140-150BPM at 10pm 2/27/20.  No sxs at that time.  No other evidence of venkat or tachyarrhythmia (last venkat in mid Jan 2020) to correspond with pt's reports of  mult recent syncopal episodes.  Device o/w functioning normally.

## 2020-02-28 NOTE — ASSESSMENT & PLAN NOTE
" He was diuresed overnight.  His symptoms  Complicated by poorly controlled hypertension and noncompliance.  In early morning he had chest pain that was not relieved by nitroglycerin  But was relieved by morphine.  He had chest x-ray suggestive of pulmonary edema with right pleural effusion.  -  Amiodarone 200 / Coreg 12.5 /Lasix 40 IV/imdur   -Renal US "There is insignificant stenosis (0-59%) in the Right Renal Artery"     discharge instructions   Follow up with Dr. Lemus 1-2 weeks  Outpatient holter/EVM, will consider EPS eval  Driving restriction for 3 months (d/w pt/wife).      "

## 2020-02-28 NOTE — HOSPITAL COURSE
Placed in observation for evaluation of midsternal nonradiating chest pain.  He was seen by Cardiology who noted recent catheterization 01/2020 med recommendation planned.  Continue amiodarone 200 Coreg 12.5 lisinopril and spironolactone.  His ICD was interrogated while in the hospital due to known chronic syncope.  Cardiology had no plan for repeat echo or Cath and recommended continue medical management, tight blood pressure control, diet control, and fluid restriction.  Otherwise his vitals are stable and he was discharged home will follow-up in clinic with cardiology.

## 2020-02-28 NOTE — ASSESSMENT & PLAN NOTE
Renal function at patient baseline. Will follow labs, renally dose medications, avoid nephrotoxic agent

## 2020-02-28 NOTE — ED PROVIDER NOTES
Encounter Date: 2/28/2020       History     Chief Complaint   Patient presents with    Shortness of Breath     pt c/o SOB x 2 days; hx of CHF; swollen face and abdomen / ascites; denies home O2 use; denies CP     Patient presents for evaluation of worsening dyspnea and orthopnea for 3 days.  Patient has history of significant congestive heart failure with an ejection fraction of 30% on last echocardiogram.  Patient states that he feels edematous in his abdomen and in his face.  He states that his chest feels tight.  Complaining of intermittent sharp pains around his cardiac defibrillator.  He denies palpitations.  He denies syncope.  He denies lightheadedness.  No abdominal pain.  He admits 1 episode of vomiting yesterday.  No diarrhea.  No back pain. No focal neurologic symptoms.        Review of patient's allergies indicates:  No Known Allergies  Past Medical History:   Diagnosis Date    AICD (automatic cardioverter/defibrillator) present 08/05/2019    Alcoholic intoxication without complication     Anemia in stage 3 chronic kidney disease 1/18/2018    CHF (congestive heart failure)     CKD stage 3 7/31/2019    Coronary artery disease     Coronary artery disease of native artery of native heart with stable angina pectoris 1/18/2018    Essential hypertension 1/17/2018    History of CVA (cerebrovascular accident) 1/18/2018    Ischemic cardiomyopathy 7/27/2019    MI (myocardial infarction)     2013, 2015, 2016, 2018 (total of 4 stents), triple CABG July 26, 2019    Mixed hyperlipidemia 1/17/2018    Nonsustained ventricular tachycardia 7/31/2019    Pneumonia of both lungs due to infectious organism      Past Surgical History:   Procedure Laterality Date    BYPASS GRAFT      CARDIAC DEFIBRILLATOR PLACEMENT Left 08/05/2019    CARDIAC SURGERY  07/26/2019    three vessel CABG    CORONARY ANGIOGRAPHY INCLUDING BYPASS GRAFTS WITH CATHETERIZATION OF LEFT HEART N/A 7/26/2019    Procedure: ANGIOGRAM,  "CORONARY, INCLUDING BYPASS GRAFT, WITH LEFT HEART CATHETERIZATION;  Surgeon: Chadwick Ramirez MD;  Location: [facility] CATH LAB;  Service: Cardiology;  Laterality: N/A;  rt groin, 5 fr, visipaque    CORONARY ANGIOPLASTY WITH STENT PLACEMENT  2018    4 stents    TRACHEOSTOMY CLOSURE       Family History   Problem Relation Age of Onset    Heart disease Mother     Hypertension Mother     Heart disease Father     Hypertension Father      Social History     Tobacco Use    Smoking status: Former Smoker     Types: Cigarettes     Last attempt to quit: 2019     Years since quittin.7    Smokeless tobacco: Never Used   Substance Use Topics    Alcohol use: Yes     Frequency: Never     Comment: beer on the weekends "barely"    Drug use: Yes     Types: Marijuana     Comment: "every now and then"     Review of Systems   Constitutional: Negative for chills, diaphoresis and fever.   HENT: Negative for congestion and sore throat.    Eyes: Negative.  Negative for visual disturbance.   Respiratory: Positive for cough, chest tightness and shortness of breath.    Cardiovascular: Positive for chest pain. Negative for palpitations and leg swelling.   Gastrointestinal: Negative for abdominal pain, diarrhea, nausea and vomiting.        NO melena or rectal bleeding   Genitourinary: Negative for dysuria, flank pain and frequency.   Musculoskeletal: Positive for neck pain. Negative for back pain.   Skin: Negative for rash and wound.   Neurological: Positive for headaches. Negative for dizziness, syncope, facial asymmetry, speech difficulty, weakness, light-headedness and numbness.        No numbness   Psychiatric/Behavioral: Negative for confusion.   All other systems reviewed and are negative.      Physical Exam     Initial Vitals [20 0029]   BP Pulse Resp Temp SpO2   (!) 170/110 74 20 97.5 °F (36.4 °C) 98 %      MAP       --         Physical Exam    Nursing note and vitals reviewed.  Constitutional: He appears " well-developed and well-nourished. He is not diaphoretic. No distress.   HENT:   Head: Normocephalic and atraumatic.   Nose: Nose normal.   Mouth/Throat: Oropharynx is clear and moist.   Eyes: Conjunctivae and EOM are normal. Pupils are equal, round, and reactive to light. Right eye exhibits no discharge. Left eye exhibits no discharge. No scleral icterus.   Neck: Neck supple. No tracheal deviation present. JVD present.   Cardiovascular: Normal rate, regular rhythm and normal heart sounds.   No murmur heard.  Pulmonary/Chest: Breath sounds normal. No stridor. No respiratory distress. He has no wheezes. He has no rhonchi. He has no rales. He exhibits no tenderness.   Abdominal: Soft. He exhibits distension (Mild). He exhibits no mass. There is no tenderness. There is no rebound and no guarding.   Musculoskeletal: Normal range of motion. He exhibits no edema or tenderness.   Neurological: He is alert and oriented to person, place, and time. He has normal strength. No cranial nerve deficit or sensory deficit. GCS score is 15. GCS eye subscore is 4. GCS verbal subscore is 5. GCS motor subscore is 6.   Skin: Skin is warm and dry. No rash noted.   Psychiatric: He has a normal mood and affect. His behavior is normal. Judgment and thought content normal.         ED Course   Procedures  Labs Reviewed   CBC W/ AUTO DIFFERENTIAL   COMPREHENSIVE METABOLIC PANEL   TROPONIN I   B-TYPE NATRIURETIC PEPTIDE     EKG Readings: (Independently Interpreted)   Initial Reading: No STEMI. Rhythm: Normal Sinus Rhythm. Heart Rate: 65. Ectopy: No Ectopy. Conduction: Normal. ST Segments: Non-Specific ST Segment Depression. T Waves Flipped: V4, V5, V6, AVL and I. Axis: Normal. Q Waves: III and AVF.   No acute changes when compared to 01/04/2020.       Imaging Results    None          Medical Decision Making:   Initial Assessment:   Patient presents with dyspnea and orthopnea with associated chest discomfort.  Patient has history of congestive  heart failure.  Mild crackles on pulmonary exam.  JVD present.  Patient is not respiratory distress. Blood pressure moderately elevated.  Suspect fluid overload and CHF exacerbation is etiology was symptoms tonight.  Will give diuretic.  Will place nitro paste on his chest.  Will check lab work and chest x-ray.  Differential Diagnosis:   CHF exacerbation, acute coronary syndrome, renal failure  Clinical Tests:   Lab Tests: Ordered and Reviewed  The following lab test(s) were unremarkable: CBC, CMP, Troponin and BNP  Radiological Study: Ordered and Reviewed  Medical Tests: Ordered and Reviewed  ED Management:  0300:  No significant EKG changes.  Lab work appears to be at baseline.  Chest x-ray consistent with acute CHF.  Blood pressure improved on nitro paste.  Will admit for diuresis.                                 Clinical Impression:       ICD-10-CM ICD-9-CM   1. Acute on chronic combined systolic and diastolic congestive heart failure I50.43 428.43     428.0   2. Shortness of breath R06.02 786.05         Disposition:   Disposition: Placed in Observation  Condition: Stable                        Law Gold MD  02/28/20 4134

## 2020-02-28 NOTE — ASSESSMENT & PLAN NOTE
Device to interrogation which showed a couple of episodes and 9 sustained V-tach.   rep advises that his device is set to 32 beats before goes off so he could have likely been having shorter episodes.  He was evaluated by Cardiology who ordered renal ultrasound found ....... tight blood pressure control diet ; fluid restriction

## 2020-02-28 NOTE — CARE UPDATE
Paged regarding the patient's complaint of chest pain in early morning.  Per nurse the patient placed in observation due to shortness breath reported vital signs have been stable patient sats have been within normal limits and heart rate within normal limits.  Patient reported pain 8/10.  Nurse reported the nocturnist had been notified regarding patient's chest pain and had ordered nitro sublingual.  Apparently nitro sublingual was not working, per nurse after the patient urinated he complained of chest pain 10/10.  Nurse reported his blood pressure was elevated 190s systolic prior to administration of nitro.  Patient's blood pressure in the 150s at the time of the call his chest pain was 8/10.      -stat troponin, CPK, lipase  -stat EKG  -morphine IV 1 mg now and q.4 hours for chest pain only, hold for somnolence, systolic blood pressure less than 110, heart rate less than 50, and respirations less 16  -consult Cardiology  -supplemental oxygen at 2 L for comfort    Present on Admission:   Acute on chronic combined systolic and diastolic congestive heart failure   Essential hypertension   Mixed hyperlipidemia   Tobacco abuse   Unstable angina        NAHOMI Delarosa, FNP-C  Hospitalist - Department of Hospital Medicine  [address], [place] [address]  Office [phone]; Pager [phone]

## 2020-02-28 NOTE — ASSESSMENT & PLAN NOTE
Seems to be presenting issue  Complicated by severe HTN  Pt denies med nonadherence  seemt o be comfortable at persent without SOB/angina  Cont med rx/GDMT  Agree with IV lasix diuresis  Check renal art US

## 2020-02-29 NOTE — PLAN OF CARE
EDUCATION:  Things You are responsible For To Manage Your Care At Home:  1.    Getting your prescriptions filled   2.    Taking your medications as directed, DO NOT MISS ANY DOSES!  3.    Going to your follow-up doctor appointment. This is important because it  allow the doctor to monitor your progress and determine if  any changes need to made to your treatment plan.  Call [facility] Help at home number for new or repeated problems / symptoms   Call 911 for CP and / or SOB   Discussed low sodium diet and fluid restrictions  Patient agreed to all above     02/28/20 2104   Final Note   Assessment Type Final Discharge Note   Anticipated Discharge Disposition Home   Hospital Follow Up  Appt(s) scheduled? Yes   Discharge plans and expectations educations in teach back method with documentation complete? Yes   Right Care Referral Info   Post Acute Recommendation No Care   Post-Acute Status   Post-Acute Authorization Other   Other Status No Post-Acute Service Needs   Discharge Delays None known at this time

## 2020-03-11 NOTE — DISCHARGE SUMMARY
[facility] Hospital Medicine  Discharge Summary      Patient Name: Abdoul Ochoa  MRN: [id number]  Admission Date: 2/28/2020  Hospital Length of Stay: 0 days  Discharge Date and Time:  03/10/2020 7:11 PM  Attending Physician: No att. providers found   Discharging Provider: CONI Castorena  Primary Care Provider: Gold Turcios MD      HPI:   Abdoul Ochoa is a 54 y.o. patient with An extensive PMHx  Which includes but is not limited to CAD, hypertension, ischemic cardiomyopathy,  Mixed hyperlipidemia, CVA.  He presented for evaluation of shortness of breath x2 days with associated orthopnea and lower extremity edema.  He was found to have lower extremity edema and was started on IV Lasix for fluid volume overload.  Patient also complained of intermittent sharp mid sternal non radiating chest pain.  In early morning he complained of midsternal chest pain and was administered nitroglycerin which improved but not relieved his pain.  Pain was relieved with 1 dose of morphine.      Last 2D echo showed LVEF of 30% with grade 3 diastolic dysfunction.  His BNP was noted to be 45,000 and troponin level 0.829.  Placed in observation for ACS rule out.    * No surgery found *      Hospital Course:   Placed in observation for evaluation of midsternal nonradiating chest pain.  He was seen by Cardiology who noted recent catheterization 01/2020 med recommendation planned.  Continue amiodarone 200 Coreg 12.5 lisinopril and spironolactone.  His ICD was interrogated while in the hospital due to known chronic syncope.  Cardiology had no plan for repeat echo or Cath and recommended continue medical management, tight blood pressure control, diet control, and fluid restriction.  Otherwise his vitals are stable and he was discharged home will follow-up in clinic with cardiology.     Consults:   Consults (From admission, onward)        Status Ordering Provider     Inpatient consult to Cardiology  Once     Provider:   Ashvin Monson MD    Completed MERVIN GANNON new Assessment & Plan notes have been filed under this hospital service since the last note was generated.  Service: Hospital Medicine    Final Active Diagnoses:    Diagnosis Date Noted POA    PRINCIPAL PROBLEM:  Acute on chronic combined systolic and diastolic congestive heart failure [I50.43] 02/28/2020 Yes    Syncope [R55] 10/28/2019 Yes    Coronary artery disease involving coronary bypass graft of native heart with unstable angina pectoris [I25.700] 07/30/2019 Yes    ICD (implantable cardioverter-defibrillator), single, in situ [Z95.810] 10/28/2019 Yes    Elevated troponin [R79.89] 07/31/2019 Yes    CKD stage 3 [N18.3] 07/31/2019 Yes     Chronic    Ischemic cardiomyopathy [I25.5] 07/27/2019 Yes     Chronic    Essential hypertension [I10] 01/17/2018 Yes     Chronic    Mixed hyperlipidemia [E78.2] 01/17/2018 Yes     Chronic    Tobacco abuse [Z72.0] 01/17/2018 Yes     Chronic      Problems Resolved During this Admission:       Discharged Condition: stable    Disposition: Home or Self Care    Follow Up:  Follow-up Information     Cosmo Galo MD On 3/5/2020.    Specialty:  Cardiology  Why:  @2:00pm, for Cardiology follow up  Contact information:  [address] [place] [address]  [id number]  [facility] [id number] [phone]                 Patient Instructions:      Diet Cardiac     Activity as tolerated       Significant Diagnostic Studies: Labs:   CMP No results for input(s): NA, K, CL, CO2, GLU, BUN, CREATININE, CALCIUM, PROT, ALBUMIN, BILITOT, ALKPHOS, AST, ALT, ANIONGAP, ESTGFRAFRICA, EGFRNONAA in the last 48 hours., CBC No results for input(s): WBC, HGB, HCT, PLT in the last 48 hours., INR   Lab Results   Component Value Date    INR 1.3 (H) 01/01/2020    INR 1.1 10/27/2019    INR 1.1 08/05/2019   , Lipid Panel   Lab Results   Component Value Date    CHOL 123 10/29/2019    HDL 22 (L) 10/29/2019    LDLCALC 87.8 10/29/2019     TRIG 66 10/29/2019    CHOLHDL 17.9 (L) 10/29/2019   , Troponin No results for input(s): TROPONINI in the last 168 hours. and A1C:   Recent Labs   Lab 10/29/19  0420 01/01/20  1720   HGBA1C 6.1* 6.1*       Pending Diagnostic Studies:     Procedure Component Value Units Date/Time    EKG 12-lead [[id number]]     Order Status:  Sent Lab Status:  No result          Medications:  Reconciled Home Medications:      Medication List      CONTINUE taking these medications    amiodarone 200 MG Tab  Commonly known as:  PACERONE  Take 1 tablet (200 mg total) by mouth once daily.     aspirin 81 MG Chew  Take 81 mg by mouth once daily.     atorvastatin 80 MG tablet  Commonly known as:  LIPITOR  Take 1 tablet (80 mg total) by mouth once daily.     carvediloL 12.5 MG tablet  Commonly known as:  COREG  Take 1 tablet (12.5 mg total) by mouth 2 (two) times daily.     clopidogreL 75 mg tablet  Commonly known as:  PLAVIX  Take 1 tablet (75 mg total) by mouth once daily.     furosemide 40 MG tablet  Commonly known as:  LASIX  Take 1 tablet (40 mg total) by mouth 2 (two) times daily.     isosorbide mononitrate 120 MG 24 hr tablet  Commonly known as:  IMDUR  Take 2 tablets (240 mg total) by mouth once daily.     lisinopriL 40 MG tablet  Commonly known as:  PRINIVIL,ZESTRIL  Take 1 tablet (40 mg total) by mouth once daily.     nitroGLYCERIN 0.4 MG SL tablet  Commonly known as:  NITROSTAT  Place 1 tablet (0.4 mg total) under the tongue every 5 (five) minutes as needed for Chest pain.     spironolactone 25 MG tablet  Commonly known as:  ALDACTONE  Take 1 tablet (25 mg total) by mouth once daily.            Indwelling Lines/Drains at time of discharge:   Lines/Drains/Airways     None                 Time spent on the discharge of patient: 35 minutes  Patient was seen and examined on the date of discharge and determined to be suitable for discharge.         NAHOMI Delarosa, FNP-C  Hospitalist - Department of Hospital Medicine  [facility]  - [address], [place] [address]  Office [phone]; Pager [phone]

## 2020-04-21 NOTE — TELEPHONE ENCOUNTER
No recent recordings received from [employer] monitoring.  Called and left message with pts sister Xena asking pt to make sure it is plugged in and to sleep by his monitor.   He can call clinic if has questions.

## 2020-06-08 NOTE — PROGRESS NOTES
Pt released in [employer]  to be followed by [facility]/Dr. Galo, Sheree Carvalho RN accepting transfer.

## 2020-06-18 NOTE — TELEPHONE ENCOUNTER
Attempted to contact patient with number provided in epic. Sister stated that patient doesn't live with her anymore and she doesn't know where to find him.

## 2020-07-10 NOTE — ED TRIAGE NOTES
Patient presents to the ED via [facility] ems. Per ems, patient was found unresponsive under a bus stop. Unpon ema arrival, patient stayed in PEA with approxiamately 30 mintues until arriving to this ED while being worked on. Lucus intact, airway intact, upon ED arrival. Patient was given 5 epi on site and en route, per ems

## 2020-07-10 NOTE — ED PROVIDER NOTES
Encounter Date: 7/10/2020       History     Chief Complaint   Patient presents with    Cardiac Arrest     Patient presents to the ED via [place] [employer] ems. Per ems, patient was found unresponsive under a bus stop. Unpon ema arrival, patient stayed in PEA with approxiamately 30 mintues until arriving to this ED while being worked on. Lucus intact, airway intact, upon ED arrival. Patient was given 5 epi on site and en route, per ems.     HPI   This 55-year-old black male presents to the emergency room with a history of myocardial infarction multiple stents and coronary artery bypass grafting as well as an AICD.  The patient was witnessed to pass out at a bus stop.  EMS was called.  They perform CPR for 20-30 minutes.  The patient did not respond.  Report that he was in a PDA.  No other history is actually known.  Five epinephrine injections were given prior to arrival.  Patient failed to respond.  The patient was also treated with Narcan.  Review of patient's allergies indicates:  No Known Allergies  Past Medical History:   Diagnosis Date    AICD (automatic cardioverter/defibrillator) present 08/05/2019    Alcoholic intoxication without complication     Anemia in stage 3 chronic kidney disease 1/18/2018    CHF (congestive heart failure)     CKD stage 3 7/31/2019    Coronary artery disease     Coronary artery disease of native artery of native heart with stable angina pectoris 1/18/2018    Essential hypertension 1/17/2018    History of CVA (cerebrovascular accident) 1/18/2018    Ischemic cardiomyopathy 7/27/2019    MI (myocardial infarction)     2013, 2015, 2016, 2018 (total of 4 stents), triple CABG July 26, 2019    Mixed hyperlipidemia 1/17/2018    Nonsustained ventricular tachycardia 7/31/2019    Pneumonia of both lungs due to infectious organism      Past Surgical History:   Procedure Laterality Date    BYPASS GRAFT      CARDIAC DEFIBRILLATOR PLACEMENT Left 08/05/2019    CARDIAC SURGERY   "2019    three vessel CABG    CORONARY ANGIOGRAPHY INCLUDING BYPASS GRAFTS WITH CATHETERIZATION OF LEFT HEART N/A 2019    Procedure: ANGIOGRAM, CORONARY, INCLUDING BYPASS GRAFT, WITH LEFT HEART CATHETERIZATION;  Surgeon: Chadwick Ramirez MD;  Location: [facility] CATH LAB;  Service: Cardiology;  Laterality: N/A;  rt groin, 5 fr, visipaque    CORONARY ANGIOPLASTY WITH STENT PLACEMENT  2018    4 stents    TRACHEOSTOMY CLOSURE       Family History   Problem Relation Age of Onset    Heart disease Mother     Hypertension Mother     Heart disease Father     Hypertension Father      Social History     Tobacco Use    Smoking status: Former Smoker     Types: Cigarettes     Quit date: 2019     Years since quittin.1    Smokeless tobacco: Never Used   Substance Use Topics    Alcohol use: Yes     Frequency: Never     Comment: beer on the weekends "barely"    Drug use: Yes     Types: Marijuana     Comment: "every now and then"     Review of Systems  The patient could not participate review of systems.  Physical Exam     Initial Vitals   BP Pulse Resp Temp SpO2   -- -- -- -- --      MAP       --         Physical Exam  The patient was examined specifically for the following:   General:No significant distress, Good color, Warm and dry. Head and neck:Scalp atraumatic, Neck supple. Neurological:Appropriate conversation, Gross motor deficits. Eyes:Conjugate gaze, Clear corneas. ENT: No epistaxis. Cardiac: Regular rate and rhythm, Grossly normal heart tones. Pulmonary: Wheezing, Rales. Gastrointestinal: Abdominal tenderness, Abdominal distention. Musculoskeletal: Extremity deformity, Apparent pain with range of motion of the joints. Skin: Rash.   The findings on examination were normal except for the following:  The patient is pulseless.  Rah device is providing chest compressions.  An LMA airway is in place.  No pulses are identified.   ED Course   Procedures  Labs Reviewed - No data to display   "     Imaging Results    None       Medical decision making:  The patient's blood sugar was 101.   Given the above this patient arrives pulseless after 20-30 minutes of efforts in the field.  Five epinephrine is were given.  I feel that the chance to salvage is negligible.  I discontinued CPR and the patient was pronounced dead at 12:59 p.m..                                      Clinical Impression:       ICD-10-CM ICD-9-CM   1. Cardiopulmonary arrest  I46.9 427.5   2. Death  R99 799.9             ED Disposition Condition                               Everardo Manning MD  07/10/20 6402

## 2020-07-10 NOTE — ED TRIAGE NOTES
Patient presents to the ED via [facility] ems. Per ems, patient was found unresponsive under a bus stop. Unpon ema arrival, patient stayed in PEA with approxiamately 30 mintues until arriving to this ED while being worked on. Lucus intact, airway intact, upon ED arrival. Patient was given 5 epi on site and en route, per ems.

## 2020-07-10 NOTE — ED NOTES
Patient's sister Xena Ochoa [phone] and family friend, Cassie [phone] left phone numbers for hospital to get in touch with them.

## 2020-07-10 NOTE — ED NOTES
Patient belongings, hat, glasses, and wallet were given to patient's sister Xena Ochoa prior to her leaving the ED.

## 2021-05-18 NOTE — ASSESSMENT & PLAN NOTE
Cont med rx  NTG gtt for acute control  Cont ACEi  Add coreg   I spoke with Dr. Justina Bañuelos. Titrating medication discussed. I also suspect the patient is having progression of condition due to debility and isolation.
